# Patient Record
Sex: MALE | Race: WHITE | NOT HISPANIC OR LATINO | Employment: OTHER | URBAN - METROPOLITAN AREA
[De-identification: names, ages, dates, MRNs, and addresses within clinical notes are randomized per-mention and may not be internally consistent; named-entity substitution may affect disease eponyms.]

---

## 2017-11-29 RX ORDER — METOPROLOL SUCCINATE 25 MG/1
25 TABLET, EXTENDED RELEASE ORAL EVERY MORNING
COMMUNITY

## 2017-11-29 RX ORDER — LISINOPRIL 20 MG/1
20 TABLET ORAL 2 TIMES DAILY
COMMUNITY
End: 2019-09-19 | Stop reason: ALTCHOICE

## 2017-11-29 RX ORDER — ROSUVASTATIN CALCIUM 10 MG/1
10 TABLET, COATED ORAL DAILY
COMMUNITY

## 2017-11-29 RX ORDER — ALFUZOSIN HYDROCHLORIDE 10 MG/1
10 TABLET, EXTENDED RELEASE ORAL
COMMUNITY
End: 2019-08-05 | Stop reason: ALTCHOICE

## 2017-11-29 RX ORDER — AZELASTINE 1 MG/ML
1 SPRAY, METERED NASAL 2 TIMES DAILY
COMMUNITY
End: 2019-09-19 | Stop reason: ALTCHOICE

## 2017-11-29 RX ORDER — FLUTICASONE PROPIONATE 50 MCG
1 SPRAY, SUSPENSION (ML) NASAL AS NEEDED
COMMUNITY
End: 2020-11-18 | Stop reason: ALTCHOICE

## 2017-11-29 RX ORDER — AMLODIPINE BESYLATE 10 MG/1
10 TABLET ORAL EVERY MORNING
COMMUNITY
End: 2022-06-21

## 2017-11-29 RX ORDER — BUPROPION HYDROCHLORIDE 300 MG/1
300 TABLET ORAL EVERY MORNING
COMMUNITY

## 2017-11-29 NOTE — PRE-PROCEDURE INSTRUCTIONS
Pre-Surgery Instructions:   Medication Instructions    alfuzosin (UROXATRAL) 10 mg 24 hr tablet Instructed patient per Anesthesia Guidelines   amLODIPine (NORVASC) 10 mg tablet Instructed patient per Anesthesia Guidelines   azelastine (ASTELIN) 0 1 % nasal spray Instructed patient per Anesthesia Guidelines   buPROPion (WELLBUTRIN XL) 300 mg 24 hr tablet Instructed patient per Anesthesia Guidelines   fluticasone (FLONASE) 50 mcg/act nasal spray Instructed patient per Anesthesia Guidelines   Ibrutinib 140 MG CAPS Instructed patient per Anesthesia Guidelines   Ibuprofen (ADVIL PO) Instructed patient per Anesthesia Guidelines   Immune Globulin, Human, 30 GM/300ML SOLN Instructed patient per Anesthesia Guidelines   lisinopril (ZESTRIL) 20 mg tablet Instructed patient per Anesthesia Guidelines   metoprolol succinate (TOPROL-XL) 25 mg 24 hr tablet Instructed patient per Anesthesia Guidelines   Multiple Vitamins-Minerals (PRESERVISION AREDS 2 PO) Instructed patient per Anesthesia Guidelines   rosuvastatin (CRESTOR) 10 MG tablet Instructed patient per Anesthesia Guidelines  Pre op instructions given  Pt to take metoprolol,amlodipine, lisinopril the am of surgery   wife Alex Surgical Experience    The following information was developed to assist you to prepare for your operation  What do I need to do before coming to the hospital?   Arrange for a responsible person to drive you to and from the hospital    Arrange care for your children at home  Children are not allowed in the recovery areas of the hospital   Plan to wear clothing that is easy to put on and take off  If you are having shoulder surgery, wear a shirt that buttons or zippers in the front  Bathing  o Shower the evening before and the morning of your surgery with an antibacterial soap   Please refer to the Pre Op Showering Instructions for Surgery Patients Sheet   o Remove nail polish and all body piercing jewelry  o Do not shave any body part for at least 24 hours before surgery-this includes face, arms, legs and upper body  Food  o Nothing to eat or drink after midnight the night before your surgery  This includes candy and chewing gum  o Exception: If your surgery is after 12:00pm (noon), you may have clear liquids such as 7-Up®, ginger ale, apple or cranberry juice, Jell-O®, water, or clear broth until 8:00 am  o Do not drink milk or juice with pulp on the morning before surgery  o Do not drink alcohol 24 hours before surgery  Medicine  o Follow instructions you received from your surgeon about which medicines you may take on the day of surgery  o If instructed to take medicine on the morning of surgery, take pills with just a small sip of water  Call your prescribing doctor for specific infroamtion on what to do if you take insulin    What should I bring to the hospital?    Bring:  Coralee Belts or a walker, if you have them, for foot or knee surgery   A list of the daily medicines, vitamins, minerals, herbals and nutritional supplements you take  Include the dosages of medicines and the time you take them each day   Glasses, dentures or hearing aids   Minimal clothing; you will be wearing hospital sleepwear   Photo ID; required to verify your identity   If you have a Living Will or Power of , bring a copy of the documents   If you have an ostomy, bring an extra pouch and any supplies you use    Do not bring   Medicines or inhalers   Money, valuables or jewelry    What other information should I know about the day of surgery?  Notify your surgeons if you develop a cold, sore throat, cough, fever, rash or any other illness     Report to the Ambulatory Surgical/Same Day Surgery Unit   You will be instructed to stop at Registration only if you have not been pre-registered   Inform your  fi they do not stay that they will be asked by the staff to leave a phone number where they can be reached   Be available to be reached before surgery  In the event the operating room schedule changes, you may be asked to come in earlier or later than expected    *It is important to tell your doctor and others involved in your health care if you are taking or have been taking any non-prescription drugs, vitamins, minerals, herbals or other nutritional supplements   Any of these may interact with some food or medicines and cause a reaction

## 2017-12-11 ENCOUNTER — ANESTHESIA EVENT (OUTPATIENT)
Dept: PERIOP | Facility: AMBULARY SURGERY CENTER | Age: 77
End: 2017-12-11
Payer: MEDICARE

## 2017-12-11 ENCOUNTER — ANESTHESIA (OUTPATIENT)
Dept: PERIOP | Facility: AMBULARY SURGERY CENTER | Age: 77
End: 2017-12-11
Payer: MEDICARE

## 2017-12-11 ENCOUNTER — HOSPITAL ENCOUNTER (OUTPATIENT)
Facility: AMBULARY SURGERY CENTER | Age: 77
Setting detail: OUTPATIENT SURGERY
Discharge: HOME | End: 2017-12-11
Attending: OPHTHALMOLOGY | Admitting: OPHTHALMOLOGY
Payer: MEDICARE

## 2017-12-11 VITALS
DIASTOLIC BLOOD PRESSURE: 61 MMHG | RESPIRATION RATE: 18 BRPM | HEART RATE: 71 BPM | SYSTOLIC BLOOD PRESSURE: 133 MMHG | HEIGHT: 70 IN | WEIGHT: 170 LBS | OXYGEN SATURATION: 94 % | BODY MASS INDEX: 24.34 KG/M2 | TEMPERATURE: 98 F

## 2017-12-11 PROCEDURE — V2632 POST CHMBR INTRAOCULAR LENS: HCPCS | Performed by: OPHTHALMOLOGY

## 2017-12-11 DEVICE — IOL SN60WF 16.0: Type: IMPLANTABLE DEVICE | Site: EYE | Status: FUNCTIONAL

## 2017-12-11 RX ORDER — PHENYLEPHRINE HCL 2.5 %
1 DROPS OPHTHALMIC (EYE)
Status: ACTIVE | OUTPATIENT
Start: 2017-12-11 | End: 2017-12-11

## 2017-12-11 RX ORDER — TETRACAINE HYDROCHLORIDE 5 MG/ML
SOLUTION OPHTHALMIC AS NEEDED
Status: DISCONTINUED | OUTPATIENT
Start: 2017-12-11 | End: 2017-12-11 | Stop reason: HOSPADM

## 2017-12-11 RX ORDER — CYCLOPENTOLATE HYDROCHLORIDE 10 MG/ML
1 SOLUTION/ DROPS OPHTHALMIC
Status: ACTIVE | OUTPATIENT
Start: 2017-12-11 | End: 2017-12-11

## 2017-12-11 RX ORDER — LIDOCAINE HYDROCHLORIDE 20 MG/ML
1 JELLY TOPICAL
Status: ACTIVE | OUTPATIENT
Start: 2017-12-11 | End: 2017-12-11

## 2017-12-11 RX ORDER — TETRACAINE HYDROCHLORIDE 5 MG/ML
1 SOLUTION OPHTHALMIC ONCE
Status: COMPLETED | OUTPATIENT
Start: 2017-12-11 | End: 2017-12-11

## 2017-12-11 RX ORDER — GATIFLOXACIN 5 MG/ML
SOLUTION/ DROPS OPHTHALMIC AS NEEDED
Status: DISCONTINUED | OUTPATIENT
Start: 2017-12-11 | End: 2017-12-11 | Stop reason: HOSPADM

## 2017-12-11 RX ORDER — MIDAZOLAM HYDROCHLORIDE 1 MG/ML
INJECTION INTRAMUSCULAR; INTRAVENOUS AS NEEDED
Status: DISCONTINUED | OUTPATIENT
Start: 2017-12-11 | End: 2017-12-11 | Stop reason: SURG

## 2017-12-11 RX ORDER — GATIFLOXACIN 5 MG/ML
1 SOLUTION/ DROPS OPHTHALMIC 2 TIMES DAILY
Qty: 3 ML | Refills: 0
Start: 2017-12-11 | End: 2019-09-19 | Stop reason: ALTCHOICE

## 2017-12-11 RX ORDER — KETOROLAC TROMETHAMINE 5 MG/ML
1 SOLUTION OPHTHALMIC
Status: ACTIVE | OUTPATIENT
Start: 2017-12-11 | End: 2017-12-11

## 2017-12-11 RX ADMIN — PHENYLEPHRINE HYDROCHLORIDE 1 DROP: 25 SOLUTION/ DROPS OPHTHALMIC at 08:36

## 2017-12-11 RX ADMIN — MIDAZOLAM HYDROCHLORIDE 2 MG: 1 INJECTION, SOLUTION INTRAMUSCULAR; INTRAVENOUS at 09:20

## 2017-12-11 RX ADMIN — KETOROLAC TROMETHAMINE 1 DROP: 5 SOLUTION OPHTHALMIC at 08:48

## 2017-12-11 RX ADMIN — LIDOCAINE HYDROCHLORIDE 1 APPLICATION: 20 JELLY TOPICAL at 08:36

## 2017-12-11 RX ADMIN — LIDOCAINE HYDROCHLORIDE 1 APPLICATION: 20 JELLY TOPICAL at 08:48

## 2017-12-11 RX ADMIN — TETRACAINE HYDROCHLORIDE 1 DROP: 5 SOLUTION OPHTHALMIC at 08:35

## 2017-12-11 RX ADMIN — CYCLOPENTOLATE HYDROCHLORIDE 1 DROP: 10 SOLUTION/ DROPS OPHTHALMIC at 08:36

## 2017-12-11 RX ADMIN — KETOROLAC TROMETHAMINE 1 DROP: 5 SOLUTION OPHTHALMIC at 09:03

## 2017-12-11 RX ADMIN — CYCLOPENTOLATE HYDROCHLORIDE 1 DROP: 10 SOLUTION/ DROPS OPHTHALMIC at 09:03

## 2017-12-11 RX ADMIN — PHENYLEPHRINE HYDROCHLORIDE 1 DROP: 25 SOLUTION/ DROPS OPHTHALMIC at 08:48

## 2017-12-11 RX ADMIN — KETOROLAC TROMETHAMINE 1 DROP: 5 SOLUTION OPHTHALMIC at 08:36

## 2017-12-11 RX ADMIN — PHENYLEPHRINE HYDROCHLORIDE 1 DROP: 25 SOLUTION/ DROPS OPHTHALMIC at 09:03

## 2017-12-11 RX ADMIN — CYCLOPENTOLATE HYDROCHLORIDE 1 DROP: 10 SOLUTION/ DROPS OPHTHALMIC at 08:48

## 2017-12-11 RX ADMIN — LIDOCAINE HYDROCHLORIDE 1 APPLICATION: 20 JELLY TOPICAL at 09:03

## 2017-12-11 NOTE — DISCHARGE INSTRUCTIONS
Dr Ysabel Barrientos Cataract Instructions    Activity:     1  No Driving until instructed   2  Keep shield on until seen tomorrow except when administering drops   3  No heavy lifting   4  No water in eye     Diet:     1  Resume normal diet    Normal Symptoms:     1  Mild Headache   2  Scratchy or picky feeling around eye    Call the office if:     1  You have any questions or concerns   2  If eye pain is not relieved by extra strength tylenol    Office phone number:  [phone]      Next appointment:     1  See Dr Ysabel Barrientos at his office tomorrow as scheduled   ___915am_______________________________________________________   2  Bring blue eye kit with you and eyedrops to the office    A new set of comprehensive instructions will be given and reviewed with you during your office visit tomorrow

## 2017-12-11 NOTE — OP NOTE
OPERATIVE REPORT    PATIENT NAME: Kait Michel    :  1940  MRN: [id number]  Pt Location: [facility] OR ROOM 01    Surgery Date: 2017    Surgeon(s) and Role:     * Leigh Pulido MD - Primary    Age-related nuclear cataract of left eye [H25 12]    Post-Op Diagnosis Codes:     * Age-related nuclear cataract of left eye [H25 12]    Procedure(s):  EXTRACTION EXTRACAPSULAR CATARACT PHACO INTRAOCULAR LENS (IOL)    Anesthesia Type:   IV Sedation with Anesthesia    Operative Indications:  Age-related nuclear cataract of left eye [H25 12]  Decreased vision to 20/40  With problems driving  Pt requested cataract sx the left eye    Procedure and Technique:    Procedure Details     The patient was brought in the OR in stable condition and placed on the operative table  The left eye was prepped and draped in the usual sterile manner  Attention was directed to the left eye where a lid speculum was placed  A 2 4 mm clear corneal incision was made temperally  1/2 cc of 1% MPF Lidocaine was irrigated into the anterior chamber followed by viscoat  The side port incision was placed superiorly  The capsularrhexis was made and the nucleus was hydrodissected with BSS  The nucleus was then removed with the phaco handpiece followed by removal of the cortical material with the I/A handpiece  The capsular bag was then filled with Provisc  The IOL was folded and placed in to the capsular bag and centered well  The remaining Provisc was removed from the eye with the I/A  The wounds were hydrated with BSS and found to be water tight  The lid speculum was removed and 2 drops of Gatifloxicin were placed over the cornea  A protective eye shield was taped over the eye and the patient went to PACU in stable condition  I will see the patient in the office tomorrow and the expected post op period is a few weeks         Complications: None        Disposition: PACU   Condition: Stable    SIGNATURE: Leigh Pulido MD  DATE:  2017  TIME: 9:43 AM

## 2017-12-11 NOTE — ANESTHESIA PREPROCEDURE EVALUATION
Review of Systems/Medical History  Patient summary reviewed  Chart reviewed      Cardiovascular  Hyperlipidemia, Hypertension ,    Pulmonary  Sleep apnea , ,        GI/Hepatic            Endo/Other  Arthritis     GYN       Hematology   Musculoskeletal       Neurology   Psychology   Depression ,            Physical Exam    Airway    Mallampati score: II  TM Distance: >3 FB  Neck ROM: full     Dental       Cardiovascular  Rhythm: regular, Rate: normal,     Pulmonary  Breath sounds clear to auscultation,     Other Findings        Anesthesia Plan  ASA Score- 3       Anesthesia Type- general with ASA Monitors  Additional Monitors:   Airway Plan:           Induction- intravenous  Informed Consent- Anesthetic plan and risks discussed with patient

## 2018-07-10 ENCOUNTER — TELEPHONE (OUTPATIENT)
Dept: PULMONOLOGY | Facility: MEDICAL CENTER | Age: 78
End: 2018-07-10

## 2019-05-06 ENCOUNTER — OFFICE VISIT (OUTPATIENT)
Dept: PULMONOLOGY | Facility: MEDICAL CENTER | Age: 79
End: 2019-05-06
Payer: MEDICARE

## 2019-05-06 VITALS
HEART RATE: 60 BPM | WEIGHT: 170 LBS | SYSTOLIC BLOOD PRESSURE: 122 MMHG | RESPIRATION RATE: 12 BRPM | OXYGEN SATURATION: 92 % | HEIGHT: 69 IN | BODY MASS INDEX: 25.18 KG/M2 | TEMPERATURE: 96.9 F | DIASTOLIC BLOOD PRESSURE: 54 MMHG

## 2019-05-06 DIAGNOSIS — J45.30 MILD PERSISTENT ASTHMA WITHOUT COMPLICATION: Primary | ICD-10-CM

## 2019-05-06 DIAGNOSIS — G47.33 OBSTRUCTIVE SLEEP APNEA: ICD-10-CM

## 2019-05-06 DIAGNOSIS — C91.10 CHRONIC LYMPHOCYTIC LEUKEMIA (CLL) GENETIC MUTATION VARIANT (HCC): ICD-10-CM

## 2019-05-06 DIAGNOSIS — R05.9 COUGH: ICD-10-CM

## 2019-05-06 PROCEDURE — 94010 BREATHING CAPACITY TEST: CPT | Performed by: INTERNAL MEDICINE

## 2019-05-06 PROCEDURE — 99214 OFFICE O/P EST MOD 30 MIN: CPT | Performed by: INTERNAL MEDICINE

## 2019-05-06 RX ORDER — TADALAFIL 5 MG/1
TABLET ORAL
COMMUNITY
Start: 2019-04-05

## 2019-05-06 RX ORDER — IPRATROPIUM BROMIDE AND ALBUTEROL SULFATE 2.5; .5 MG/3ML; MG/3ML
3 SOLUTION RESPIRATORY (INHALATION) ONCE
Status: COMPLETED | OUTPATIENT
Start: 2019-05-06 | End: 2019-05-06

## 2019-05-06 RX ADMIN — IPRATROPIUM BROMIDE AND ALBUTEROL SULFATE 3 ML: 2.5; .5 SOLUTION RESPIRATORY (INHALATION) at 11:09

## 2019-05-12 PROBLEM — C91.10: Status: ACTIVE | Noted: 2019-05-12

## 2019-05-12 PROBLEM — R05.9 COUGH: Status: ACTIVE | Noted: 2019-05-12

## 2019-05-12 PROBLEM — G47.33 OBSTRUCTIVE SLEEP APNEA: Status: ACTIVE | Noted: 2019-05-12

## 2019-06-04 ENCOUNTER — TRANSCRIBE ORDERS (OUTPATIENT)
Dept: ADMINISTRATIVE | Facility: HOSPITAL | Age: 79
End: 2019-06-04

## 2019-06-04 DIAGNOSIS — M85.80 OSTEOPENIA, UNSPECIFIED LOCATION: ICD-10-CM

## 2019-06-04 DIAGNOSIS — Z13.820 SPECIAL SCREENING FOR OSTEOPOROSIS: Primary | ICD-10-CM

## 2019-06-13 ENCOUNTER — OFFICE VISIT (OUTPATIENT)
Dept: PULMONOLOGY | Facility: MEDICAL CENTER | Age: 79
End: 2019-06-13
Payer: MEDICARE

## 2019-06-13 VITALS
HEART RATE: 60 BPM | DIASTOLIC BLOOD PRESSURE: 62 MMHG | SYSTOLIC BLOOD PRESSURE: 134 MMHG | OXYGEN SATURATION: 95 % | BODY MASS INDEX: 25.18 KG/M2 | WEIGHT: 170 LBS | TEMPERATURE: 98.2 F | RESPIRATION RATE: 12 BRPM | HEIGHT: 69 IN

## 2019-06-13 DIAGNOSIS — G47.33 OBSTRUCTIVE SLEEP APNEA: Primary | ICD-10-CM

## 2019-06-13 DIAGNOSIS — R05.9 COUGH: ICD-10-CM

## 2019-06-13 DIAGNOSIS — J45.30 MILD PERSISTENT ASTHMA WITHOUT COMPLICATION: ICD-10-CM

## 2019-06-13 PROCEDURE — 94010 BREATHING CAPACITY TEST: CPT | Performed by: INTERNAL MEDICINE

## 2019-06-13 PROCEDURE — 99214 OFFICE O/P EST MOD 30 MIN: CPT | Performed by: INTERNAL MEDICINE

## 2019-06-29 ENCOUNTER — HOSPITAL ENCOUNTER (OUTPATIENT)
Dept: RADIOLOGY | Age: 79
Discharge: HOME | End: 2019-06-29
Payer: MEDICARE

## 2019-06-29 DIAGNOSIS — Z13.820 SPECIAL SCREENING FOR OSTEOPOROSIS: ICD-10-CM

## 2019-06-29 DIAGNOSIS — M85.80 OSTEOPENIA, UNSPECIFIED LOCATION: ICD-10-CM

## 2019-06-29 PROCEDURE — 77080 DXA BONE DENSITY AXIAL: CPT

## 2019-07-24 ENCOUNTER — OFFICE VISIT (OUTPATIENT)
Dept: PULMONOLOGY | Facility: MEDICAL CENTER | Age: 79
End: 2019-07-24
Payer: MEDICARE

## 2019-07-24 VITALS
RESPIRATION RATE: 12 BRPM | DIASTOLIC BLOOD PRESSURE: 62 MMHG | BODY MASS INDEX: 25.48 KG/M2 | TEMPERATURE: 97.9 F | WEIGHT: 172 LBS | OXYGEN SATURATION: 89 % | SYSTOLIC BLOOD PRESSURE: 142 MMHG | HEART RATE: 65 BPM | HEIGHT: 69 IN

## 2019-07-24 DIAGNOSIS — J45.30 MILD PERSISTENT ASTHMA WITHOUT COMPLICATION: ICD-10-CM

## 2019-07-24 DIAGNOSIS — G47.33 OBSTRUCTIVE SLEEP APNEA: ICD-10-CM

## 2019-07-24 DIAGNOSIS — G47.33 OSA (OBSTRUCTIVE SLEEP APNEA): Primary | ICD-10-CM

## 2019-07-24 PROCEDURE — 99213 OFFICE O/P EST LOW 20 MIN: CPT | Performed by: NURSE PRACTITIONER

## 2019-07-24 RX ORDER — AMOXICILLIN AND CLAVULANATE POTASSIUM 875; 125 MG/1; MG/1
1 TABLET, FILM COATED ORAL 2 TIMES DAILY
COMMUNITY
Start: 2019-07-23 | End: 2019-08-05 | Stop reason: SDUPTHER

## 2019-07-24 RX ORDER — AMOXICILLIN AND CLAVULANATE POTASSIUM 875; 125 MG/1; MG/1
TABLET, FILM COATED ORAL
Refills: 0 | COMMUNITY
Start: 2019-07-23 | End: 2019-09-19 | Stop reason: ALTCHOICE

## 2019-07-24 NOTE — ASSESSMENT & PLAN NOTE
Patient has history of mild intermittent asthma  pulmonary function tests were reviewed patient from 5/6/19  FVC was 3 61 L or 89% of predicted, FEV   Is currently on Augmentin  He has a mild bronchitis  He has a history of CLL  According to patient IgG E transfusions have not been available  He did speak to his hematologist oncologist at [facility] [place] and antibiotics were prescribed  Currently he is stable  He has not used a maintenance inhaler in some time

## 2019-07-24 NOTE — PROGRESS NOTES
Assessment/Plan:     Problem List Items Addressed This Visit        Respiratory    Obstructive sleep apnea (Chronic)     Arya Huggins has history of obstructive sleep apnea  He has been using his CPAP  At last visit Dr Kong Stockton did change his CPAP pressure range 10-18 cm of water pressure  Compliance data is reviewed from June 24, 2000 19-7 03/20/2019  Patient is 90% compliant with usage over 4 hours for 70%  Average usage is 7 hours and 30 minutes  This is on 10-18 cm of water pressure  His AHI has since been reduced to 7 4 events per hour  At last visit, compliance was reviewed from May 14 to June 12 2019  Resulting AHI was 12 2  Patient would also like to try a new mask  I placed an order for ResMed med air touch F20 with foam insert  This is a fullface mask  Patient continues to use use an benefit from auto CPAP  Mild persistent asthma without complication     Patient has history of mild intermittent asthma  pulmonary function tests were reviewed patient from 5/6/19  FVC was 3 61 L or 89% of predicted, FEV   Is currently on Augmentin  He has a mild bronchitis  He has a history of CLL  According to patient IgG E transfusions have not been available  He did speak to his hematologist oncologist at [facility] [place] and antibiotics were prescribed  Currently he is stable  He has not used a maintenance inhaler in some time  Other Visit Diagnoses     STEFANY (obstructive sleep apnea)    -  Primary            Return in about 6 months (around 1/24/2020)  All questions are answered to the patient's satisfaction and understanding  He verbalizes understanding  He is encouraged to call with any further questions or concerns  Portions of the record may have been created with voice recognition software  Occasional wrong word or "sound a like" substitutions may have occurred due to the inherent limitations of voice recognition software    Read the chart carefully and recognize, using context, where substitutions have occurred  HPI:  Bryce Anton is a 68-year-old male who was last seen in the office June 13, 2019  He has a history of mild persistent asthma  He has not been using any maintenance inhaler  Last pulmonary function test was done May 6, 2019  Mild airway obstruction was noted  Forced vital capacity was 3 60 L or 89% of predicted, FEV1 was 2 14 L or 74% of predicted obstruction ratio 59  Flow volume loop showed a mild obstructive pattern  Bryce Anton also has a diagnosis of obstructive sleep apnea  Electronically Signed by MAREN Oliva    ______________________________________________________________________    Chief Complaint:   Chief Complaint   Patient presents with    Shortness of Breath     pt states that he has had  cold symptoms for  about 4 days ago   Cough     pt states that he also had a sore throat and running nose/ congestion    Sleep Apnea     pt states he has no issues with machine  except when he had nasal congestion in which he did not use machine for 2 days       Patient ID: Bryce Anton is a 70 y o  y o  male has a past medical history of Arthritis, BPH (benign prostatic hyperplasia), Cancer (HCC), Chronic lymphocytic leukemia (CLL), B-cell ([address] ), CPAP (continuous positive airway pressure) dependence, Depression, Hearing aid worn, Hyperlipidemia, Hypertension, Sinusitis, Sleep apnea, Tinnitus, and Wears glasses  7/24/2019  Patient presents today for follow-up visit  HPI    Review of Systems    Smoking history: He reports that he quit smoking about 34 years ago  He has a 20 00 pack-year smoking history  He has never used smokeless tobacco     The following portions of the patient's history were reviewed and updated as appropriate: allergies, current medications, past family history, past medical history, past social history, past surgical history and problem list       There is no immunization history on file for this patient    Current Outpatient Medications   Medication Sig Dispense Refill    alfuzosin (UROXATRAL) 10 mg 24 hr tablet Take 10 mg by mouth daily at bedtime      amLODIPine (NORVASC) 10 mg tablet Take 10 mg by mouth every morning      amoxicillin-clavulanate (AUGMENTIN) 875-125 mg per tablet Take 1 tablet by mouth 2 (two) times a day      azelastine (ASTELIN) 0 1 % nasal spray 1 spray into each nostril 2 (two) times a day Use in each nostril as directed      Bromfenac Sodium (BROMSITE) 0 075 % SOLN Administer 1 drop into the left eye 2 (two) times a day  0    buPROPion (WELLBUTRIN XL) 300 mg 24 hr tablet Take 300 mg by mouth every morning      fluticasone (FLONASE) 50 mcg/act nasal spray 1 spray into each nostril as needed for rhinitis      Ibrutinib 140 MG CAPS Take by mouth every morning      Ibrutinib 140 MG CAPS Take by mouth      Ibuprofen (ADVIL PO) Take 600 mg by mouth 2 (two) times a day      immune globulin, human (BIVIGAM) 5 GM/50ML Infuse 30 g into a venous catheter      Immune Globulin, Human, 30 GM/300ML SOLN Inject as directed Every 6 weeks-IV      lisinopril (ZESTRIL) 20 mg tablet Take 20 mg by mouth 2 (two) times a day      metoprolol succinate (TOPROL-XL) 25 mg 24 hr tablet Take 37 5 mg by mouth every morning       Multiple Vitamins-Minerals (ICAPS AREDS 2 PO)       Multiple Vitamins-Minerals (PRESERVISION AREDS 2 PO) Take by mouth 2 (two) times a day      rosuvastatin (CRESTOR) 10 MG tablet Take 10 mg by mouth every morning      tadalafil (CIALIS) 5 MG tablet       amoxicillin-clavulanate (AUGMENTIN) 875-125 mg per tablet TAKE 1 TABLET BY MOUTH EVERY 12 HOURS FOR 14 DAYS  0    gatifloxacin (ZYMAXID) 0 5 % Administer 1 drop into the left eye 2 (two) times a day (Patient not taking: Reported on 5/6/2019) 3 mL 0     No current facility-administered medications for this visit        Allergies: Rocephin [ceftriaxone]    Objective:  Vitals:    07/24/19 0924   BP: 142/62   BP Location: Left arm   Patient Position: Sitting   Cuff Size: Standard Pulse: 65   Resp: 12   Temp: 97 9 °F (36 6 °C)   TempSrc: Tympanic   SpO2: (!) 89%   Weight: 78 kg (172 lb)   Height: 5' 9" (1 753 m)   Oxygen Therapy  SpO2: (!) 89 %    Wt Readings from Last 3 Encounters:   07/24/19 78 kg (172 lb)   06/13/19 77 1 kg (170 lb)   05/06/19 77 1 kg (170 lb)     Body mass index is 25 4 kg/m²  Physical Exam   Constitutional: He is oriented to person, place, and time  He appears well-developed and well-nourished  HENT:   Head: Normocephalic and atraumatic  Mouth/Throat: Oropharynx is clear and moist    Mallampati 3   Eyes: Pupils are equal, round, and reactive to light  Neck: Normal range of motion  Neck supple  Cardiovascular: Normal rate and regular rhythm  Pulmonary/Chest: Effort normal and breath sounds normal    Abdominal: Soft  Musculoskeletal: Normal range of motion  Right lower leg: Normal         Left lower leg: Normal    Neurological: He is alert and oriented to person, place, and time  Skin: Skin is warm and dry  Capillary refill takes less than 2 seconds  Psychiatric: He has a normal mood and affect  His behavior is normal        Lab Review:   No visits with results within 6 Month(s) from this visit  Latest known visit with results is:   No results found for any previous visit  Diagnostics:  I have personally reviewed pertinent reports  Office Spirometry Results:     ESS:    Dxa Bone Density Spine Hip And Pelvis    Result Date: 7/3/2019  Narrative: CENTRAL  DXA SCAN CLINICAL HISTORY:   70year old  male risk factors include chronic lymphocytic leukemia  Previous smoking  Admits to consuming 3 or more alcoholic beverages a day (wine)  Osteoporosis screening  TECHNIQUE: Bone densitometry was performed using a [employer] Horizon A bone densitometer  Regions of interest appear properly placed  There are no obvious fractures or other confounding variables which could limit the study    Degenerative changes of the lumbar spine and hip  This will falsely elevate the bone mineral densities in these regions  COMPARISON:  None  RESULTS: LUMBAR SPINE:  L1-L4: BMD 1 065 gm/cm2 T-score -0 2 Z-score 0 8 LEFT TOTAL HIP: BMD 0 948 gm/cm2 T-score -0 6 Z-score 0 3 LEFT FEMORAL NECK: BMD 0 809 gm/cm2 T-score -0 9 Z-score 0 5 LEFT FOREARM : BMD 0 764 gm/sq-cm, T-score is  -1 1 Z-score is  0 6      Impression: 1  Based on the [facility] classification, the T-score of -1 1 in the left forearm is consistent with low bone mineral density (OSTEOPENIA)  2   Any secondary causes of low bone mineral density should be excluded prior to treatment, if clinically indicated  3   A daily intake of at least 1200 mg calcium and 800 - 1000 IU of Vitamin D, as well as weight bearing and muscle strengthening exercise, fall prevention and avoidance of tobacco and excessive alcohol intake as basic preventive measures are suggested  The 10 year risk of hip fracture is 2%, with the 10 year risk of major osteoporotic fracture being 7%, as calculated by the [facility] fracture risk assessment tool (FRAX)  The current NOF guidelines recommend treating patients with FRAX 10 year risk score of >3% for hip fracture and >20% for major osteoporotic fracture   WHO CLASSIFICATION: Normal (a T-score of -1 0 or higher) Low bone mineral density (a T-score of less than -1 0 but higher than -2 5) Osteoporosis (a T-score of -2 5 or less) Severe osteoporosis (a T-score of -2 5 or less with a fragility fracture)   Workstation performed: [id number]

## 2019-07-24 NOTE — ASSESSMENT & PLAN NOTE
Arnoldo Mccullough has history of obstructive sleep apnea  He has been using his CPAP  At last visit Dr Tyrese Villa did change his CPAP pressure range 10-18 cm of water pressure  Compliance data is reviewed from June 24, 2000 19-7 03/20/2019  Patient is 90% compliant with usage over 4 hours for 70%  Average usage is 7 hours and 30 minutes  This is on 10-18 cm of water pressure  His AHI has since been reduced to 7 4 events per hour  At last visit, compliance was reviewed from May 14 to June 12 2019  Resulting AHI was 12 2  Patient would also like to try a new mask  I placed an order for ResMed med air touch F20 with foam insert  This is a fullface mask  Patient continues to use use an benefit from auto CPAP

## 2019-08-05 ENCOUNTER — CONSULT (OUTPATIENT)
Dept: VASCULAR SURGERY | Facility: CLINIC | Age: 79
End: 2019-08-05
Payer: MEDICARE

## 2019-08-05 VITALS
BODY MASS INDEX: 24.34 KG/M2 | HEART RATE: 56 BPM | HEIGHT: 70 IN | TEMPERATURE: 97 F | DIASTOLIC BLOOD PRESSURE: 58 MMHG | SYSTOLIC BLOOD PRESSURE: 126 MMHG | WEIGHT: 170 LBS

## 2019-08-05 DIAGNOSIS — I73.9 PERIPHERAL ARTERIAL DISEASE (HCC): Primary | ICD-10-CM

## 2019-08-05 DIAGNOSIS — I10 HYPERTENSION, UNSPECIFIED TYPE: ICD-10-CM

## 2019-08-05 DIAGNOSIS — C91.10 CHRONIC LYMPHOCYTIC LEUKEMIA (CLL) GENETIC MUTATION VARIANT (HCC): ICD-10-CM

## 2019-08-05 DIAGNOSIS — I70.211: ICD-10-CM

## 2019-08-05 DIAGNOSIS — E78.5 HYPERLIPIDEMIA, UNSPECIFIED HYPERLIPIDEMIA TYPE: ICD-10-CM

## 2019-08-05 PROCEDURE — 99204 OFFICE O/P NEW MOD 45 MIN: CPT | Performed by: NURSE PRACTITIONER

## 2019-08-05 RX ORDER — ASPIRIN 81 MG/1
81 TABLET ORAL DAILY
Qty: 30 TABLET | Refills: 1 | Status: SHIPPED | OUTPATIENT
Start: 2019-08-05 | End: 2019-09-19 | Stop reason: ALTCHOICE

## 2019-08-05 NOTE — PROGRESS NOTES
Assessment/Plan:    Athscl native arteries of flavio w intrmt sean, right leg ([facility] [address] )  76yo male with PMH HTN, HLD, CLL, STEFANY, DM, PAD with previous right iliac artery stenting and back surgery for neurogenic neuropathy, now presents with intermittent claudication to the RLE  -Symptoms for apprx 6-8 weeks; short distance claudication, apprx 300ft  -one incident of rest pain, otherwise denies any rest pain  -motor/sensory intact to RLE    Recent LEAD at outside facility reviewed with the patient and his wife  He has diffuse disease to the right lower extremity with focal stenosis in the mid to distal SFA with monophasic signals at the femoral, popliteal and peroneal artery as noted  His BRUNO is 0 96/GTP 96     We discussed the pathophysiology of atherosclerotic disease, patient and his wife expressed understanding  There is no need for urgent vascular intervention as patient is motor/sensory intact with easily palpable Femoral, DP, PT pulse to the right leg  Recommendations:  -Will obtain exercise BRUNO to further assess PAD/claudication symptoms  -Will also obtain aorto-iliac duplex to assess patency of iliac stent; he has easily palpable Right femoral pulse, the left was 1+   -discussed best medical management to include aspirin daily; patient has difficulty with bruising and bleeding due to his CLL and medical treatment  He will trial an 81mg aspirin daily and discuss further with Vascular surgeon at next office visit; continue statin therapy  -No recent lipid panel; will order now; f/u with PCP for statin dosing and long-term f/u  -continue with optimal BP control per PCP  -after testing is completed, patient will return to the office to discuss with Dr Jose Arango  -discussed acute ischemia and changes, when to go to ER for evaluation; patient expressed understanding       Diagnoses and all orders for this visit:    Peripheral arterial disease ([facility] [address] )  -     aspirin (ECOTRIN LOW STRENGTH) 81 mg EC tablet;  Take 1 tablet (81 mg total) by mouth daily  -     VAS BRUNO with exercise study; Future  -     VAS abdominal aorta/iliacs; complete study; Future  -     Lipid panel; Future    Hyperlipidemia, unspecified hyperlipidemia type  -     Lipid panel; Future    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, right leg ([facility] [address] )    Hypertension, unspecified type    Chronic lymphocytic leukemia (CLL) genetic mutation variant ([facility] [address] )          Subjective:      Patient ID: Sara Pate is a 70 y o  male  Pt is new to our office  Pt is here to review results of LEXY done on 8/1/2019  He c/o aching pain in his right calf when he walks or rides a bike  He says that when he rests for several minutes the pain subsides  He says that he has occasional pain at night when legs are elevated  He says that the pain was in his right calf and woke him up  Pt denies any open wounds  Pt takes Rosuvastatin 10 mg daily  He is a former smoker  Cannon is a 76yo male with PMH HTN, HLD, CLL, STEFANY, DM, PAD with previous right iliac artery stenting and back surgery for neurogenic neuropathy, now presents with intermittent claudication to the RLE  He was referred to our office by Dr Anamika Mosqueda for evaluation  Patient reports he's had symptoms for apprx 6-8 weeks; He gets pain and cramping to the right calf with short distances, apprx 300ft and with bike rides  He also had one incident of rest pain, otherwise denies any rest pain  He reports the pain to be at the lower leg/ankle area  Not radiating up the leg or into the foot  He remains motor/sensory intact to RLE, denies cold extremity, paleness, wounds, or ulcerations  He is a former smoker, he is on statin  The following portions of the patient's history were reviewed and updated as appropriate: allergies, current medications, past family history, past medical history, past social history, past surgical history and problem list     Review of Systems   Constitutional: Negative  HENT: Negative  Eyes: Negative  Respiratory: Positive for cough  Cardiovascular: Negative  Gastrointestinal: Negative  Endocrine: Negative  Genitourinary: Positive for difficulty urinating  Musculoskeletal: Positive for arthralgias and back pain  Skin: Negative  Allergic/Immunologic: Negative  Neurological: Negative  Hematological: Negative  Psychiatric/Behavioral: Negative  Objective:      /58 (BP Location: Left arm, Patient Position: Sitting, Cuff Size: Standard)   Pulse 56   Temp (!) 97 °F (36 1 °C) (Tympanic)   Ht 5' 10" (1 778 m)   Wt 77 1 kg (170 lb)   BMI 24 39 kg/m²          Physical Exam   Constitutional: He is oriented to person, place, and time  He appears well-developed and well-nourished  HENT:   Head: Normocephalic and atraumatic  Eyes: Pupils are equal, round, and reactive to light  EOM are normal  No scleral icterus  Neck: Normal range of motion  Carotid bruit is not present  Cardiovascular: Normal rate, regular rhythm and normal heart sounds  Pulses:       Carotid pulses are 2+ on the right side, and 2+ on the left side  Radial pulses are 2+ on the right side, and 2+ on the left side  Femoral pulses are 2+ on the right side, and 1+ on the left side  Popliteal pulses are 0 on the right side, and 0 on the left side  Dorsalis pedis pulses are 2+ on the right side, and 2+ on the left side  Posterior tibial pulses are 1+ on the right side, and 1+ on the left side  Pulmonary/Chest: Effort normal and breath sounds normal    Abdominal: Soft  Normal appearance and bowel sounds are normal  He exhibits no abdominal bruit and no pulsatile midline mass  Musculoskeletal: Normal range of motion  Neurological: He is alert and oriented to person, place, and time  He has normal strength  Skin: Skin is warm and dry  Capillary refill takes less than 2 seconds  Abrasion noted  Psychiatric: He has a normal mood and affect   His speech is normal and behavior is normal  Judgment and thought content normal  Cognition and memory are normal    Nursing note and vitals reviewed  I have reviewed and made appropriate changes to the review of systems input by the medical assistant  Vitals:    08/05/19 1316   BP: 126/58   BP Location: Left arm   Patient Position: Sitting   Cuff Size: Standard   Pulse: 56   Temp: (!) 97 °F (36 1 °C)   TempSrc: Tympanic   Weight: 77 1 kg (170 lb)   Height: 5' 10" (1 778 m)       Patient Active Problem List   Diagnosis    Mild persistent asthma without complication    Cough    Obstructive sleep apnea    Chronic lymphocytic leukemia (CLL) genetic mutation variant (HCC)    Hyperlipidemia    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, right leg (HCC)    Hypertension       Past Surgical History:   Procedure Laterality Date    BACK SURGERY  09/2017    laminotomy L3,4 S1-    CARPAL TUNNEL RELEASE Bilateral     CATARACT EXTRACTION W/ INTRAOCULAR LENS IMPLANT Left 12/11/2017    Procedure: EXTRACTION EXTRACAPSULAR CATARACT PHACO INTRAOCULAR LENS (IOL);   Surgeon: Tee Friedman MD;  Location: [facility] MAIN OR;  Service: Ophthalmology    CERVICAL DISCECTOMY  1982    C5-6    COLONOSCOPY      HERNIA REPAIR      ROTATOR CUFF REPAIR Left 2006    TONSILLECTOMY         Family History   Problem Relation Age of Onset   [facility] Cancer Mother         bladder    Heart disease Father     Stroke Father     Parkinsonism Brother        Social History     Socioeconomic History    Marital status: /Civil Union     Spouse name: Not on file    Number of children: Not on file    Years of education: Not on file    Highest education level: Not on file   Occupational History    Not on file   Social Needs    Financial resource strain: Not on file    Food insecurity:     Worry: Not on file     Inability: Not on file    Transportation needs:     Medical: Not on file     Non-medical: Not on file   Tobacco Use    Smoking status: Former Smoker     Packs/day: 1 00     Years: 20 00     Pack years: 20 00     Last attempt to quit:      Years since quittin 6    Smokeless tobacco: Never Used   Substance and Sexual Activity    Alcohol use:  Yes     Alcohol/week: 7 0 standard drinks     Types: 7 Glasses of wine per week    Drug use: No    Sexual activity: Not on file   Lifestyle    Physical activity:     Days per week: Not on file     Minutes per session: Not on file    Stress: Not on file   Relationships    Social connections:     Talks on phone: Not on file     Gets together: Not on file     Attends Jainism service: Not on file     Active member of club or organization: Not on file     Attends meetings of clubs or organizations: Not on file     Relationship status: Not on file    Intimate partner violence:     Fear of current or ex partner: Not on file     Emotionally abused: Not on file     Physically abused: Not on file     Forced sexual activity: Not on file   Other Topics Concern    Not on file   Social History Narrative    Not on file       Allergies   Allergen Reactions    Rocephin [Ceftriaxone]      Avoids d/t previous liver toxicity         Current Outpatient Medications:     amLODIPine (NORVASC) 10 mg tablet, Take 10 mg by mouth every morning, Disp: , Rfl:     amoxicillin-clavulanate (AUGMENTIN) 875-125 mg per tablet, TAKE 1 TABLET BY MOUTH EVERY 12 HOURS FOR 14 DAYS, Disp: , Rfl: 0    buPROPion (WELLBUTRIN XL) 300 mg 24 hr tablet, Take 300 mg by mouth every morning, Disp: , Rfl:     Ibrutinib 140 MG CAPS, Take by mouth every morning, Disp: , Rfl:     Immune Globulin, Human, 30 GM/300ML SOLN, Inject as directed Every 6 weeks-IV, Disp: , Rfl:     lisinopril (ZESTRIL) 20 mg tablet, Take 20 mg by mouth 2 (two) times a day, Disp: , Rfl:     metoprolol succinate (TOPROL-XL) 25 mg 24 hr tablet, Take 37 5 mg by mouth every morning , Disp: , Rfl:     Multiple Vitamins-Minerals (ICAPS AREDS 2 PO), , Disp: , Rfl:     Multiple Vitamins-Minerals (PRESERVISION AREDS 2 PO), Take by mouth 2 (two) times a day, Disp: , Rfl:     rosuvastatin (CRESTOR) 10 MG tablet, Take 10 mg by mouth every morning, Disp: , Rfl:     tadalafil (CIALIS) 5 MG tablet, , Disp: , Rfl:     aspirin (ECOTRIN LOW STRENGTH) 81 mg EC tablet, Take 1 tablet (81 mg total) by mouth daily, Disp: 30 tablet, Rfl: 1    azelastine (ASTELIN) 0 1 % nasal spray, 1 spray into each nostril 2 (two) times a day Use in each nostril as directed, Disp: , Rfl:     Bromfenac Sodium (BROMSITE) 0 075 % SOLN, Administer 1 drop into the left eye 2 (two) times a day (Patient not taking: Reported on 8/5/2019), Disp: , Rfl: 0    fluticasone (FLONASE) 50 mcg/act nasal spray, 1 spray into each nostril as needed for rhinitis, Disp: , Rfl:     gatifloxacin (ZYMAXID) 0 5 %, Administer 1 drop into the left eye 2 (two) times a day (Patient not taking: Reported on 5/6/2019), Disp: 3 mL, Rfl: 0    Ibuprofen (ADVIL PO), Take 600 mg by mouth 2 (two) times a day, Disp: , Rfl:

## 2019-08-05 NOTE — PATIENT INSTRUCTIONS
Try taking Asprin 81mg daily either chewable or Enteric coated for PAD  Continue with lipitor daily  I am ordering a Lipid panel as well  Additionally, we will do an exercise BRUNO and aorto-iliac ultrasound to assess the inflow and peripheral flow  Once the testing is completed, you will follow-up with Dr Bard Gilmore  If you have any questions, please call our office  Peripheral Artery Disease   WHAT YOU NEED TO KNOW:   What is peripheral artery disease? Peripheral artery disease (PAD) is narrow, weak, or blocked arteries  It may affect any arteries outside of your heart and brain  PAD is usually the result of a buildup of fat and cholesterol, also called plaque, along your artery walls  Inflammation, a blood clot, or abnormal cell growth could also block your arteries  PAD prevents normal blood flow to your legs and arms  You are at risk of an amputation if poor blood flow keeps wounds from healing or causes gangrene (tissue death)  Without treatment, PAD can also cause a heart attack or stroke  What increases my risk for PAD? · Smoking cigarettes     · Diabetes     · High blood pressure     · High cholesterol     · Age older than 40 years    · Heart disease or a family history of heart disease  What are the signs and symptoms of PAD? Mild PAD usually does not cause symptoms  As the disease worsens over time, you may have the following:  · Pain or cramps in your leg or hip while you walk     · A numb, weak, or heavy feeling in your legs     · Dry, scaly, red, or pale skin on your legs     · Thick or brittle nails, or hair loss on your arms and legs     · Foot sores that will not heal     · Burning or aching in your feet and toes while resting (this may be worse when you lie down)  How is PAD diagnosed? · Angiography  is a test that shows pictures of the arteries in your arms and legs  You will be given contrast liquid to help the arteries show up better on the pictures   The pictures will be taken with an MRI or CT scan  Tell the healthcare provider if you have ever had an allergic reaction to contrast liquid  Do not enter the MRI room with anything metal  Metal can cause serious injury  Tell a healthcare provider if you have any metal in or on your body  · Doppler ankle brachial index (BRUNO)  is a test that compares blood pressure in your ankles to blood pressure in your arms  This tells your healthcare provider how well blood is flowing through the arteries in your legs  How is PAD treated? Treatment can help reduce your risk of a heart attack, stroke, or amputation  You may need more than one of the following:  · Medicines  may be given  Your healthcare provider may give you any of the following:     ¨ Antiplatelet medicine,  such as aspirin, helps prevent blood clots and reduces the risk of a heart attack or stroke  ¨ Statin medicine  helps lower your cholesterol and prevents your PAD from getting worse  · A supervised exercise program  helps you stay active in normal daily activities and may prevent disability  Healthcare providers will help you safely walk or do strength training exercises 3 times a week for 30 to 60 minutes  You will do this for several months, then transition to walking on your own  · Angioplasty  is a procedure to open your artery so blood can flow through normally  A thin tube called a catheter is used to insert a small balloon into your artery  The balloon is inflated to open your blocked artery, and then removed  A tube called a stent may be placed in your artery to hold it open  · Bypass surgery  is used to make a new connection to your artery with a vein from another part of your body, or an artificial graft  The vein or graft is attached to your artery above and below your blockage  This allows blood to flow around the blocked portion of your artery  How can I manage PAD? · Do not smoke    Nicotine and other chemicals in cigarettes and cigars can worsen PAD  They can also increase your risk for a heart attack or stroke  Ask your healthcare provider for information if you currently smoke and need help to quit  E-cigarettes or smokeless tobacco still contain nicotine  Talk to your healthcare provider before you use these products  · Manage other health conditions  Take your medicines as directed  Follow your healthcare provider's instructions if you have high blood pressure or high cholesterol  Perform foot care and check your blood sugar levels as directed if you have diabetes  · Eat heart healthy foods  Eat whole grains, fruits, and vegetables every day  Limit salt and high-fat foods  Ask your healthcare provider for more information on a heart healthy diet  Ask if you need to lose weight  Your healthcare provider can help you create a healthy weight-loss plan  Call 911 for the following:   · You have any of the following signs of a heart attack:      ¨ Squeezing, pressure, or pain in your chest that lasts longer than 5 minutes or returns    ¨ Discomfort or pain in your back, neck, jaw, stomach, or arm     ¨ Trouble breathing    ¨ Nausea or vomiting    ¨ Lightheadedness or a sudden cold sweat, especially with chest pain or trouble breathing    · You have any of the following signs of a stroke:      ¨ Numbness or drooping on one side of your face     ¨ Weakness in an arm or leg    ¨ Confusion or difficulty speaking    ¨ Dizziness, a severe headache, or vision loss  When should I seek immediate care? · You have sores or wounds that will not heal      · You notice black or discolored skin on your arm or leg  · Your skin is cool to the touch  When should I contact my healthcare provider? · You have leg pain when you walk 1/8 mile (200 meters) or less, even with treatment  · Your legs are red, dry, or pale, even with treatment  · You have questions or concerns about your condition or care    CARE AGREEMENT:   You have the right to help plan your care  Learn about your health condition and how it may be treated  Discuss treatment options with your caregivers to decide what care you want to receive  You always have the right to refuse treatment  The above information is an  only  It is not intended as medical advice for individual conditions or treatments  Talk to your doctor, nurse or pharmacist before following any medical regimen to see if it is safe and effective for you  © 2017 [employer]  Information is for End User's use only and may not be sold, redistributed or otherwise used for commercial purposes  All illustrations and images included in CareNotes® are the copyrighted property of A [employer] M [employer]  or [employer]

## 2019-08-05 NOTE — LETTER
August 5, 2019     Dang Mohr MD  [address] [place] [address]    Patient: Jony Macedo   YOB: 1940   Date of Visit: 8/5/2019       Dear Dr Seema Mccarty: Thank you for referring Elvis Jackson to me for evaluation  Below are my notes for this consultation  If you have questions, please do not hesitate to call me  I look forward to following your patient along with you           Sincerely,        MAREN Burnett        CC: Chelsey Bueno MD

## 2019-08-05 NOTE — ASSESSMENT & PLAN NOTE
76yo male with PMH HTN, HLD, CLL, STEFANY, DM, PAD with previous right iliac artery stenting and back surgery for neurogenic neuropathy, now presents with intermittent claudication to the RLE  -Symptoms for apprx 6-8 weeks; short distance claudication, apprx 300ft  -one incident of rest pain, otherwise denies any rest pain  -motor/sensory intact to RLE    Recent LEAD at outside facility reviewed with the patient and his wife  He has diffuse disease to the right lower extremity with focal stenosis in the mid to distal SFA with monophasic signals at the femoral, popliteal and peroneal artery as noted  His BRUNO is 0 96/GTP 96     We discussed the pathophysiology of atherosclerotic disease, patient and his wife expressed understanding  There is no need for urgent vascular intervention as patient is motor/sensory intact with easily palpable Femoral, DP, PT pulse to the right leg  Recommendations:  -Will obtain exercise BRUNO to further assess PAD/claudication symptoms  -Will also obtain aorto-iliac duplex to assess patency of iliac stent; he has easily palpable Right femoral pulse, the left was 1+   -discussed best medical management to include aspirin daily; patient has difficulty with bruising and bleeding due to his CLL and medical treatment    He will trial an 81mg aspirin daily and discuss further with Vascular surgeon at next office visit; continue statin therapy  -No recent lipid panel; will order now; f/u with PCP for statin dosing and long-term f/u  -continue with optimal BP control per PCP  -after testing is completed, patient will return to the office to discuss with Dr Cresencio Nina  -discussed acute ischemia and changes, when to go to ER for evaluation; patient expressed understanding

## 2019-08-13 ENCOUNTER — OFFICE VISIT (OUTPATIENT)
Dept: PULMONOLOGY | Facility: MEDICAL CENTER | Age: 79
End: 2019-08-13
Payer: MEDICARE

## 2019-08-13 VITALS
DIASTOLIC BLOOD PRESSURE: 58 MMHG | BODY MASS INDEX: 24.62 KG/M2 | RESPIRATION RATE: 12 BRPM | OXYGEN SATURATION: 93 % | HEART RATE: 57 BPM | HEIGHT: 70 IN | WEIGHT: 172 LBS | SYSTOLIC BLOOD PRESSURE: 136 MMHG

## 2019-08-13 DIAGNOSIS — R05.9 COUGH: Primary | ICD-10-CM

## 2019-08-13 DIAGNOSIS — J45.30 MILD PERSISTENT ASTHMA WITHOUT COMPLICATION: ICD-10-CM

## 2019-08-13 DIAGNOSIS — G47.33 OBSTRUCTIVE SLEEP APNEA: Chronic | ICD-10-CM

## 2019-08-13 PROCEDURE — 99213 OFFICE O/P EST LOW 20 MIN: CPT | Performed by: NURSE PRACTITIONER

## 2019-08-13 NOTE — ASSESSMENT & PLAN NOTE
Patient has had a cough for approximately the last 5-6 weeks  He was treated with Augmentin as he has history of CLL and was not able to receive his gammaglobulin infusions  Patient did have a CT of chest done at [address] [place] [address] in the early summer of 2019  Nodular infiltrates were seen  These images were reviewed by Dr Jesus Manuel Little  He had a repeat CT that was done at [address] [place] [address]  This was done in April 2019  On May 30, 2019, Repeat CT showed resolution of nodular infiltrates  He finished Augmentin approximately 2 days ago  According to patient, his cough is now recurrent  Is yellow and thick  His  Lungs are clear to auscultation  He did receive infusion of IgG yesterday at [facility]   I will give him a sputum sample and also order a chest x-ray  He can hold off on the chest x-ray unless his cough worsens  Sputum sample will be obtained and hopefully he will be able to produce an adequate specimen tomorrow  Patient does have history of postnasal drip  He has a nasal quality to his voice today  He is instructed to restart fluticasone nasal spray which can be purchased over-the-counter  1 spray in each nostril daily is adequate  I have given the patient a 14 day sample of Arnuity 100 mcg 1 puff daily  If the cough is secondary to inflammatory response, this should help him

## 2019-08-13 NOTE — ASSESSMENT & PLAN NOTE
Patient has a history of obstructive sleep apnea  His initial study was in August 25, 2014  Moderate obstructive sleep apnea was diagnosed  Apneic hypopnea index was 23 events per hour  Oxygen saturation on average was 91% with lowest at 73  He went on to have a titration study  This was done September 2014  He was titrated to a CPAP of 7 cm of water pressure  At that setting there was 1 central apnea 1 mixed apnea and 1 hypopnea for overall AHI of 6 7  REM sleep was achieved  He currently is on auto CPAP  His apneic hypopnea index is reduced to 7 8 average usage is 4 hours and 28 minutes  Clearly his AHI has been reduced since he is using auto CPAP, he still has elevated apneic hypopnea index  Options would include in the future consideration of a repeat titration study  He is also due to get a new mask which was discussed at his last visit    According to patient, he is not able to get this until November of 2018

## 2019-08-13 NOTE — ASSESSMENT & PLAN NOTE
Patient has history of mild persistent asthma without complication  He has not used a maintenance inhaler in some time  He does have a cough that is relatively chronic for the last 5 weeks  He completed antibiotics as he has a history of CLL  He has just recently restarted IgG  Infusions at [facility]   In the interim, I gave him a 2 week sample of inhaled corticosteroid ARnuity 100 mics 1 puff daily    Would like him to try this to see if it is helpful in reducing inflammation

## 2019-08-13 NOTE — PATIENT INSTRUCTIONS
Sleep Apnea   AMBULATORY CARE:   Sleep apnea  is also called obstructive sleep apnea  It is a condition that causes you to stop breathing for 10 seconds or more while you are sleeping  During normal sleep, your throat is kept open by muscles that let air pass through easily  Sleep apnea causes the muscles and tissues around your throat to relax and block air from passing through  Sleep apnea may happen many times while you are asleep  Common symptoms include the following:   · No signs of breathing for 10 seconds or more while you sleep    · Snoring loudly, snorting, gasping or choking while you sleep, and waking up suddenly because of these    · A hard time thinking, remembering things, or focusing on your tasks the following day    · Headache or nausea    · Bedwetting or waking up often during the night to urinate    · Feeling sleepy, slow, and tired during the day  Seek care immediately if:   · You have chest pain or trouble breathing  Contact your healthcare provider if:   · You feel tired or depressed  · You have trouble staying awake during the day  · You have trouble thinking clearly  · You have questions or concerns about your condition or care  Treatment for sleep apnea  includes using a continuous positive airway pressure (CPAP) machine to keep your airway open during sleep  A mask is placed over your nose and mouth, or just your nose  The mask is hooked to the CPAP machine that blows a gentle stream of air into the mask when you breathe  This helps keep your airway open so you can breathe more regularly  Extra oxygen may be given to you through the machine  You may be given a mouth device  It looks like a mouth guard or dental retainer and stops your tongue and mouth tissues from blocking your throat while you sleep  Surgery may be needed to remove extra tissues that block your mouth, throat, or nose  Manage sleep apnea:   · Do not smoke    Nicotine and other chemicals in cigarettes and cigars can cause lung damage  Ask your healthcare provider for information if you currently smoke and need help to quit  E-cigarettes or smokeless tobacco still contain nicotine  Talk to your healthcare provider before you use these products  · Do not drink alcohol or take sedative medicine before you go to sleep  Alcohol and sedatives can relax the muscles and tissues around your throat  This can block the airflow to your lungs  · Maintain a healthy weight  Excess tissue around your throat may restrict your breathing  Ask your healthcare provider for information if you need to lose weight  · Sleep on your side or use pillows designed to prevent sleep apnea  This prevents your tongue or other tissues from blocking your throat  You can also raise the head of your bed  Follow up with your healthcare provider as directed:  Write down your questions so you remember to ask them during your visits  © 2017 [employer] Information is for End User's use only and may not be sold, redistributed or otherwise used for commercial purposes  All illustrations and images included in CareNotes® are the copyrighted property of A [employer] M [employer]  or Zack Youngblood  The above information is an  only  It is not intended as medical advice for individual conditions or treatments  Talk to your doctor, nurse or pharmacist before following any medical regimen to see if it is safe and effective for you

## 2019-08-13 NOTE — PROGRESS NOTES
Assessment/Plan:     Problem List Items Addressed This Visit        Respiratory    Obstructive sleep apnea (Chronic)     Patient has a history of obstructive sleep apnea  His initial study was in August 25, 2014  Moderate obstructive sleep apnea was diagnosed  Apneic hypopnea index was 23 events per hour  Oxygen saturation on average was 91% with lowest at 73  He went on to have a titration study  This was done September 2014  He was titrated to a CPAP of 7 cm of water pressure  At that setting there was 1 central apnea 1 mixed apnea and 1 hypopnea for overall AHI of 6 7  REM sleep was achieved  He currently is on auto CPAP  His apneic hypopnea index is reduced to 7 8 average usage is 4 hours and 28 minutes  Clearly his AHI has been reduced since he is using auto CPAP, he still has elevated apneic hypopnea index  Options would include in the future consideration of a repeat titration study  He is also due to get a new mask which was discussed at his last visit  According to patient, he is not able to get this until November of 2018           Mild persistent asthma without complication     Patient has history of mild persistent asthma without complication  He has not used a maintenance inhaler in some time  He does have a cough that is relatively chronic for the last 5 weeks  He completed antibiotics as he has a history of CLL  He has just recently restarted IgG  Infusions at [facility]   In the interim, I gave him a 2 week sample of inhaled corticosteroid ARnuity 100 mics 1 puff daily  Would like him to try this to see if it is helpful in reducing inflammation         Relevant Medications    fluticasone (ARNUITY ELLIPTA) 100 MCG/ACT AEPB inhaler       Other    Cough - Primary     Patient has had a cough for approximately the last 5-6 weeks  He was treated with Augmentin as he has history of CLL and was not able to receive his gammaglobulin infusions    Patient did have a CT of chest done at [facility] in the early summer of 2019  Nodular infiltrates were seen  These images were reviewed by Dr Rao Meth  He had a repeat CT that was done at [facility] [place]  This was done in April 2019  On May 30, 2019, Repeat CT showed resolution of nodular infiltrates  He finished Augmentin approximately 2 days ago  According to patient, his cough is now recurrent  Is yellow and thick  His  Lungs are clear to auscultation  He did receive infusion of IgG yesterday at [facility]   I will give him a sputum sample and also order a chest x-ray  He can hold off on the chest x-ray unless his cough worsens  Sputum sample will be obtained and hopefully he will be able to produce an adequate specimen tomorrow  Patient does have history of postnasal drip  He has a nasal quality to his voice today  He is instructed to restart fluticasone nasal spray which can be purchased over-the-counter  1 spray in each nostril daily is adequate  I have given the patient a 14 day sample of Arnuity 100 mcg 1 puff daily  If the cough is secondary to inflammatory response, this should help him  Relevant Orders    XR chest pa & lateral    Sputum culture and Gram stain            Return in about 4 weeks (around 9/10/2019)  All questions are answered to the patient's satisfaction and understanding  He verbalizes understanding  He is encouraged to call with any further questions or concerns  Portions of the record may have been created with voice recognition software  Occasional wrong word or "sound a like" substitutions may have occurred due to the inherent limitations of voice recognition software  Read the chart carefully and recognize, using context, where substitutions have occurred  HPI:  Griffin Dixon is a 53-year-old male who has a history of mild persistent asthma without complication  Last pulmonary function test showed mild to moderate airflow obstruction    FEV1 was 2 30 L or 79% of predicted  He has not used a maintenance inhaler for some time  Patient has cough that started in June of 2019  Apparently had a CT of the chest done at [address] [place] [address] that showed resolution of nodular infiltrate in both lower lobes  He was treated with levofloxacin in late April  These infiltrates related to infection  He has history of hypo gamma globulin knee me a related to CLL  Patient has been receiving monthly gamma globulin infusions at [facility]   For about a 2 month  There was a national shortage  Patient did not receive these infusions for nearly 2 months and yesterday restarted  According to patient he has recurrent cough  He was feeling better for some time  He was given Augmentin by his medical oncologist [facility]  Electronically Signed by Gregary Schaumann, CRNP    ______________________________________________________________________    Chief Complaint:   Chief Complaint   Patient presents with    Cough     productive yellow 5 weeks       Patient ID: Lala Jordan is a 70 y o  y o  male has a past medical history of Arthritis, BPH (benign prostatic hyperplasia), Cancer ([address] ), Chronic lymphocytic leukemia (CLL), B-cell ([address] ), CPAP (continuous positive airway pressure) dependence, Depression, Hearing aid worn, Hyperlipidemia, Hypertension, Sinusitis, Sleep apnea, Tinnitus, and Wears glasses  8/13/2019  Patient presents today for follow-up visit  Cough   This is a chronic problem  The current episode started more than 1 month ago  The problem has been unchanged  The cough is productive of sputum  Associated symptoms include nasal congestion and postnasal drip  The symptoms are aggravated by lying down  Review of Systems   Constitutional: Negative  HENT: Positive for postnasal drip and voice change  Respiratory: Positive for cough  Cardiovascular: Negative  Gastrointestinal: Negative  Endocrine: Negative      Genitourinary: Negative  Musculoskeletal: Negative  Skin: Negative  Allergic/Immunologic: Negative  Neurological: Negative  Hematological: Negative  Psychiatric/Behavioral: Negative  Smoking history: He reports that he quit smoking about 34 years ago  He has a 20 00 pack-year smoking history  He has never used smokeless tobacco     The following portions of the patient's history were reviewed and updated as appropriate: allergies, current medications, past family history, past medical history, past social history, past surgical history and problem list       There is no immunization history on file for this patient    Current Outpatient Medications   Medication Sig Dispense Refill    amLODIPine (NORVASC) 10 mg tablet Take 10 mg by mouth every morning      buPROPion (WELLBUTRIN XL) 300 mg 24 hr tablet Take 300 mg by mouth every morning      Ibrutinib 140 MG CAPS Take by mouth every morning      Immune Globulin, Human, 30 GM/300ML SOLN Inject as directed Every 6 weeks-IV      lisinopril (ZESTRIL) 20 mg tablet Take 20 mg by mouth 2 (two) times a day      metoprolol succinate (TOPROL-XL) 25 mg 24 hr tablet Take 37 5 mg by mouth every morning       Multiple Vitamins-Minerals (PRESERVISION AREDS 2 PO) Take by mouth 2 (two) times a day      rosuvastatin (CRESTOR) 10 MG tablet Take 10 mg by mouth every morning      tadalafil (CIALIS) 5 MG tablet       amoxicillin-clavulanate (AUGMENTIN) 875-125 mg per tablet TAKE 1 TABLET BY MOUTH EVERY 12 HOURS FOR 14 DAYS  0    aspirin (ECOTRIN LOW STRENGTH) 81 mg EC tablet Take 1 tablet (81 mg total) by mouth daily (Patient not taking: Reported on 8/13/2019) 30 tablet 1    azelastine (ASTELIN) 0 1 % nasal spray 1 spray into each nostril 2 (two) times a day Use in each nostril as directed      Bromfenac Sodium (BROMSITE) 0 075 % SOLN Administer 1 drop into the left eye 2 (two) times a day (Patient not taking: Reported on 8/5/2019)  0    fluticasone (ARNUITY ELLIPTA) 100 MCG/ACT AEPB inhaler Inhale 1 puff daily Rinse mouth after use  1 Inhaler 0    fluticasone (FLONASE) 50 mcg/act nasal spray 1 spray into each nostril as needed for rhinitis      gatifloxacin (ZYMAXID) 0 5 % Administer 1 drop into the left eye 2 (two) times a day (Patient not taking: Reported on 5/6/2019) 3 mL 0    Ibuprofen (ADVIL PO) Take 600 mg by mouth 2 (two) times a day      Multiple Vitamins-Minerals (ICAPS AREDS 2 PO)        No current facility-administered medications for this visit  Allergies: Rocephin [ceftriaxone]    Objective:  Vitals:    08/13/19 1552   BP: 136/58   BP Location: Left arm   Patient Position: Sitting   Cuff Size: Standard   Pulse: 57   Resp: 12   SpO2: 93%   Weight: 78 kg (172 lb)   Height: 5' 10" (1 778 m)   Oxygen Therapy  SpO2: 93 %    Wt Readings from Last 3 Encounters:   08/13/19 78 kg (172 lb)   08/05/19 77 1 kg (170 lb)   07/24/19 78 kg (172 lb)     Body mass index is 24 68 kg/m²  Physical Exam   Constitutional: He appears well-developed and well-nourished  HENT:   Head: Normocephalic and atraumatic  Mallampati 4   Eyes: Pupils are equal, round, and reactive to light  EOM are normal    Neck: Normal range of motion  Neck supple  Cardiovascular: Normal rate and regular rhythm  Pulmonary/Chest: Effort normal    Abdominal: Soft  Musculoskeletal: Normal range of motion  Neurological: He is alert  Skin: Skin is warm and dry  Capillary refill takes less than 2 seconds  Psychiatric: He has a normal mood and affect  His behavior is normal        Lab Review:   No visits with results within 6 Month(s) from this visit  Latest known visit with results is:   No results found for any previous visit         Diagnostics:  I have personally reviewed pertinent films in PACS    Office Spirometry Results:     ESS:    Us Outside Images    Result Date: 8/6/2019  Narrative: 1 2 410 [id number]

## 2019-09-10 ENCOUNTER — OFFICE VISIT (OUTPATIENT)
Dept: PULMONOLOGY | Facility: MEDICAL CENTER | Age: 79
End: 2019-09-10
Payer: MEDICARE

## 2019-09-10 VITALS
TEMPERATURE: 97.7 F | SYSTOLIC BLOOD PRESSURE: 106 MMHG | OXYGEN SATURATION: 95 % | HEART RATE: 58 BPM | HEIGHT: 71 IN | WEIGHT: 172 LBS | BODY MASS INDEX: 24.08 KG/M2 | DIASTOLIC BLOOD PRESSURE: 50 MMHG | RESPIRATION RATE: 12 BRPM

## 2019-09-10 DIAGNOSIS — J45.30 MILD PERSISTENT ASTHMA WITHOUT COMPLICATION: Primary | ICD-10-CM

## 2019-09-10 DIAGNOSIS — G47.33 OBSTRUCTIVE SLEEP APNEA: Chronic | ICD-10-CM

## 2019-09-10 PROCEDURE — 99214 OFFICE O/P EST MOD 30 MIN: CPT | Performed by: INTERNAL MEDICINE
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
## 2019-09-10 NOTE — PATIENT INSTRUCTIONS
Predicted peak flow rate is 450 liters/minute    Continue Arnuity 100 mcg 1 puff daily    Call office Wednesday to see if we have a coupon for a free Arnuity

## 2019-09-10 NOTE — ASSESSMENT & PLAN NOTE
Mild persistent asthma  Patient will continue Arnuity 100 mcg 1 puff daily    He does have a peak flow meter at home  I did tell his peak flow rate should be about 450 liters/minute    IV starts having any problem postnasal drainage or allergies he can start Claritin or Allegra over-the-counter  Does use Flonase intermittently for postnasal drainage

## 2019-09-11 NOTE — ASSESSMENT & PLAN NOTE
Mynor has moderate obstructive sleep apnea  His initial sleep study was done October 2014 is a home sleep study  This revealed 177 obstructive apneas, 8 central apneas, and 39 obstructive hypopneas for overall AHI of 23  He had some mild snoring noted and average O2 saturation was 91%  Oxygen hardik was 73% he spent 16% of the time below 90%  Hans Boggs has been compliant with using his CPAP and denies any excessive daytime sounds are feeling like he is going to slow all sleep while driving any long distances  He does commute to his [facility]  I reviewed compliance data from 8/11 to 9/9/19  This revealed overall AHI of 6 5 which is satisfactory  His CPAP is set on auto CPAP with a range of 10 to 18 cm water  He used the CPAP for an average of 5 hours per night  His maximal CPAP pressure was 17  I encouraged him to continue to use the CPAP on regular basis    I made no changes in his auto CPAP setting

## 2019-09-13 ENCOUNTER — HOSPITAL ENCOUNTER (OUTPATIENT)
Dept: NON INVASIVE DIAGNOSTICS | Facility: CLINIC | Age: 79
Discharge: HOME | End: 2019-09-13
Payer: MEDICARE

## 2019-09-13 ENCOUNTER — APPOINTMENT (OUTPATIENT)
Dept: LAB | Facility: CLINIC | Age: 79
End: 2019-09-13
Payer: MEDICARE

## 2019-09-13 DIAGNOSIS — I73.9 PERIPHERAL ARTERIAL DISEASE (HCC): ICD-10-CM

## 2019-09-13 DIAGNOSIS — E78.5 HYPERLIPIDEMIA, UNSPECIFIED HYPERLIPIDEMIA TYPE: ICD-10-CM

## 2019-09-13 LAB
CHOLEST SERPL-MCNC: 151 MG/DL (ref 50–200)
HDLC SERPL-MCNC: 67 MG/DL (ref 40–60)
LDLC SERPL CALC-MCNC: 77 MG/DL (ref 0–100)
NONHDLC SERPL-MCNC: 84 MG/DL
TRIGL SERPL-MCNC: 36 MG/DL

## 2019-09-13 PROCEDURE — 93978 VASCULAR STUDY: CPT

## 2019-09-13 PROCEDURE — 36415 COLL VENOUS BLD VENIPUNCTURE: CPT

## 2019-09-13 PROCEDURE — 80061 LIPID PANEL: CPT

## 2019-09-13 PROCEDURE — 93924 LWR XTR VASC STDY BILAT: CPT

## 2019-09-14 PROCEDURE — 93924 LWR XTR VASC STDY BILAT: CPT | Performed by: SURGERY

## 2019-09-14 PROCEDURE — 93978 VASCULAR STUDY: CPT | Performed by: SURGERY

## 2019-09-19 ENCOUNTER — OFFICE VISIT (OUTPATIENT)
Dept: VASCULAR SURGERY | Facility: CLINIC | Age: 79
End: 2019-09-19
Payer: MEDICARE

## 2019-09-19 VITALS
BODY MASS INDEX: 24.34 KG/M2 | SYSTOLIC BLOOD PRESSURE: 130 MMHG | TEMPERATURE: 97 F | DIASTOLIC BLOOD PRESSURE: 56 MMHG | WEIGHT: 170 LBS | HEIGHT: 70 IN

## 2019-09-19 DIAGNOSIS — I70.211: Primary | ICD-10-CM

## 2019-09-19 PROCEDURE — 99213 OFFICE O/P EST LOW 20 MIN: CPT | Performed by: SURGERY

## 2019-09-19 NOTE — PROGRESS NOTES
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, right leg ([address] )  -     Basic metabolic panel; Future  -     CTA abdominal w run off w wo contrast; Future        Subjective:      Patient ID: Landa Blizzard is a 70 y o  male  HPI disabling claudication right lower extremity approximately 1-2 blocks no ischemic rest pain or tissue loss  He had neurogenic claudication in the past and this is not representative that  The following portions of the patient's history were reviewed and updated as appropriate: allergies, current medications, past family history, past medical history, past social history, past surgical history and problem list     Review of Systems  claudication right lower extremity no constitutional symptoms no weight loss fatigue or fever, no  or GI symptoms, no skin rash, all other systems negative    Objective:      /56 (BP Location: Right arm, Patient Position: Sitting)   Temp (!) 97 °F (36 1 °C) (Tympanic)   Ht 5' 10" (1 778 m)   Wt 77 1 kg (170 lb)   BMI 24 39 kg/m²          Physical Exam          Oriented x3 no evidence of clinical depression  Eyes:  Sclera non-icteric    Skin: normal without evidence of inflammation    Neck is supple carotid pulses equal bilaterally no bruits heard    Chest lungs clear, heart regular rhythm      Abdomen soft nontender no evidence of pulsatile masses  Pulses are palpable bilateral Femoral 2+ on the right 1+ on the left, pulses are palpable dorsalis pedis on the right 1+ on the left 2+    Neurological exam intact cranial nerves 2-12 grossly intact no gross motor sensory deficits detected  Imaging viewed and reviewed with Patient    I have reviewed and made appropriate changes to the review of systems input by the medical assistant  Vitals:    09/19/19 1004   BP: 130/56   BP Location: Right arm   Patient Position: Sitting   Temp: (!) 97 °F (36 1 °C)   TempSrc: Tympanic   Weight: 77 1 kg (170 lb)   Height: 5' 10" (1 778 m)       Patient Active Problem List   Diagnosis    Mild persistent asthma without complication    Cough    Obstructive sleep apnea    Chronic lymphocytic leukemia (CLL) genetic mutation variant (HCC)    Hyperlipidemia    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, right leg ([address] )    Hypertension       Past Surgical History:   Procedure Laterality Date    BACK SURGERY  09/2017    laminotomy L3,4 S1-    CARPAL TUNNEL RELEASE Bilateral     CATARACT EXTRACTION W/ INTRAOCULAR LENS IMPLANT Left 12/11/2017    Procedure: EXTRACTION EXTRACAPSULAR CATARACT PHACO INTRAOCULAR LENS (IOL);   Surgeon: Becki Robbins MD;  Location: [facility] MAIN OR;  Service: Ophthalmology    CERVICAL DISCECTOMY  1982    C5-6    COLONOSCOPY      HERNIA REPAIR      ROTATOR CUFF REPAIR Left 2006    TONSILLECTOMY         Family History   Problem Relation Age of Onset   [facility] Cancer Mother         bladder    Heart disease Father     Stroke Father     Parkinsonism Brother        Social History     Socioeconomic History    Marital status: /Civil Union     Spouse name: Not on file    Number of children: Not on file    Years of education: Not on file    Highest education level: Not on file   Occupational History    Not on file   Social Needs    Financial resource strain: Not on file    Food insecurity:     Worry: Not on file     Inability: Not on file    Transportation needs:     Medical: Not on file     Non-medical: Not on file   Tobacco Use    Smoking status: Former Smoker     Packs/day:      Years: 20 00     Pack years: 20 00     Last attempt to quit:      Years since quittin 7    Smokeless tobacco: Never Used   Substance and Sexual Activity    Alcohol use:  Yes     Alcohol/week: 7 0 standard drinks     Types: 7 Glasses of wine per week    Drug use: No    Sexual activity: Not on file   Lifestyle    Physical activity:     Days per week: Not on file     Minutes per session: Not on file    Stress: Not on file   Relationships    Social connections:     Talks on phone: Not on file     Gets together: Not on file     Attends Faith service: Not on file     Active member of club or organization: Not on file     Attends meetings of clubs or organizations: Not on file     Relationship status: Not on file    Intimate partner violence:     Fear of current or ex partner: Not on file     Emotionally abused: Not on file     Physically abused: Not on file     Forced sexual activity: Not on file   Other Topics Concern    Not on file   Social History Narrative    Not on file       Allergies   Allergen Reactions    Rocephin [Ceftriaxone]      Avoids d/t previous liver toxicity         Current Outpatient Medications:     amLODIPine (NORVASC) 10 mg tablet, Take 10 mg by mouth every morning, Disp: , Rfl:     buPROPion (WELLBUTRIN XL) 300 mg 24 hr tablet, Take 300 mg by mouth every morning, Disp: , Rfl:     fluticasone (FLONASE) 50 mcg/act nasal spray, 1 spray into each nostril as needed for rhinitis, Disp: , Rfl:     Ibrutinib 140 MG CAPS, Take by mouth every morning, Disp: , Rfl:     Immune Globulin, Human, 30 GM/300ML SOLN, Inject as directed Every 6 weeks-IV, Disp: , Rfl:     metoprolol succinate (TOPROL-XL) 25 mg 24 hr tablet, Take 37 5 mg by mouth every morning , Disp: , Rfl:     Multiple Vitamins-Minerals (PRESERVISION AREDS 2 PO), Take by mouth 2 (two) times a day, Disp: , Rfl:     rosuvastatin (CRESTOR) 10 MG tablet, Take 10 mg by mouth every morning, Disp: , Rfl:     tadalafil (CIALIS) 5 MG tablet, , Disp: , Rfl:

## 2019-09-19 NOTE — LETTER
September 19, 2019     Micha Camejo MD  [address] [place] [id number]    Patient: Jesse Hirsch   YOB: 1940   Date of Visit: 9/19/2019       Dear Dr Nasra Fontenot: Thank you for referring Bekah Mixon to me for evaluation  Below are my notes for this consultation  If you have questions, please do not hesitate to call me  I look forward to following your patient along with you           Sincerely,        Marjorie Leblanc MD        CC: No Recipients

## 2019-09-19 NOTE — PATIENT INSTRUCTIONS
Presents with disabling claudication right lower extremity is able to walk about 1 block which time he has to rest for about for 5 minutes  No ischemic rest pain or tissue loss  Aortoiliac and lower extremity artery ABIs were performed with stress which did show significant drop of his BRUNO on the right leg  Plan:  Sending off a CT angiogram for evaluation and then a follow-up appointment to review the study and potentially recommend possible intervention

## 2019-09-30 ENCOUNTER — HOSPITAL ENCOUNTER (OUTPATIENT)
Dept: CT IMAGING | Facility: HOSPITAL | Age: 79
Discharge: HOME | End: 2019-09-30
Attending: SURGERY
Payer: MEDICARE

## 2019-09-30 DIAGNOSIS — I70.211: ICD-10-CM

## 2019-09-30 PROCEDURE — 75635 CT ANGIO ABDOMINAL ARTERIES: CPT

## 2019-09-30 RX ADMIN — IOHEXOL 120 ML: 350 INJECTION, SOLUTION INTRAVENOUS at 17:54

## 2019-10-10 ENCOUNTER — OFFICE VISIT (OUTPATIENT)
Dept: VASCULAR SURGERY | Facility: CLINIC | Age: 79
End: 2019-10-10
Payer: MEDICARE

## 2019-10-10 VITALS
HEIGHT: 70 IN | SYSTOLIC BLOOD PRESSURE: 128 MMHG | HEART RATE: 66 BPM | WEIGHT: 175 LBS | DIASTOLIC BLOOD PRESSURE: 58 MMHG | TEMPERATURE: 97.3 F | RESPIRATION RATE: 14 BRPM | BODY MASS INDEX: 25.05 KG/M2

## 2019-10-10 DIAGNOSIS — I70.211: Primary | ICD-10-CM

## 2019-10-10 PROCEDURE — 99212 OFFICE O/P EST SF 10 MIN: CPT | Performed by: SURGERY

## 2019-10-10 RX ORDER — OXYCODONE HYDROCHLORIDE AND ACETAMINOPHEN 5; 325 MG/1; MG/1
1 TABLET ORAL EVERY 4 HOURS PRN
Refills: 0 | COMMUNITY
Start: 2019-10-07 | End: 2020-11-18 | Stop reason: ALTCHOICE

## 2019-10-10 NOTE — ASSESSMENT & PLAN NOTE
Continues with disabling claudication right lower extremity  Able to walk about 1 block and then has to stop for several minutes  He is an active 53-year-old and this does affect the quality of his life      Plan:  Review of CT angiogram with evidence of a distal aortic stent into the right common iliac lesions at the distal superficial femoral and mid popliteal   Plan an antegrade approach on the right verses left brachial approach sometime in near future on an outpatient basis

## 2019-10-10 NOTE — LETTER
October 10, 2019     Chantal Watt MD  [address] [place] [id number]    Patient: Elijah Greenwood   YOB: 1940   Date of Visit: 10/10/2019       Dear Dr Carmona Officer: Thank you for referring Ramesh Camilo to me for evaluation  Below are my notes for this consultation  If you have questions, please do not hesitate to call me  I look forward to following your patient along with you           Sincerely,        Adelina Moura MD        CC: No Recipients

## 2019-10-10 NOTE — PROGRESS NOTES
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, right leg ([address] )  Continues with disabling claudication right lower extremity  Able to walk about 1 block and then has to stop for several minutes  He is an active 62-year-old and this does affect the quality of his life  Plan:  Review of CT angiogram with evidence of a distal aortic stent into the right common iliac lesions at the distal superficial femoral and mid popliteal   Plan an antegrade approach on the right verses left brachial approach sometime in near future on an outpatient basis    Stop Ibrutinib for 4 days 5th day procedure     Diagnoses and all orders for this visit:    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, right leg ([address] )    Other orders  -     oxyCODONE-acetaminophen (PERCOCET) 5-325 mg per tablet; Take 1 tablet by mouth every 4 (four) hours as needed        Subjective:      Patient ID: Kavon Brownlee is a 70 y o  male  HPI short distance claudication right lower extremity approximately 1 block has stopped for several minutes      The following portions of the patient's history were reviewed and updated as appropriate: allergies, current medications, past family history, past medical history, past social history, past surgical history and problem list     Review of Systems  disabling claudication right lower extremity, CLL, no GI or  symptoms, no constitutional symptoms or skin rash, all other systems negative    Objective:      /58 (BP Location: Left arm, Patient Position: Sitting, Cuff Size: Adult)   Pulse 66   Temp (!) 97 3 °F (36 3 °C) (Tympanic)   Resp 14   Ht 5' 10" (1 778 m)   Wt 79 4 kg (175 lb)   BMI 25 11 kg/m²          Physical Exam      Left groin ecchymosis status post hernia repair,    Chest lungs clear heart regular rhythm S1-S2,    Easily palpable left brachial pulse and right femoral pulse    I have reviewed and made appropriate changes to the review of systems input by the medical assistant  Vitals:    10/10/19 1040   BP: 128/58   BP Location: Left arm   Patient Position: Sitting   Cuff Size: Adult   Pulse: 66   Resp: 14   Temp: (!) 97 3 °F (36 3 °C)   TempSrc: Tympanic   Weight: 79 4 kg (175 lb)   Height: 5' 10" (1 778 m)       Patient Active Problem List   Diagnosis    Mild persistent asthma without complication    Cough    Obstructive sleep apnea    Chronic lymphocytic leukemia (CLL) genetic mutation variant (HCC)    Hyperlipidemia    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, right leg (HCC)    Hypertension       Past Surgical History:   Procedure Laterality Date    BACK SURGERY  2017    laminotomy L3,4 S1-    CARPAL TUNNEL RELEASE Bilateral     CATARACT EXTRACTION W/ INTRAOCULAR LENS IMPLANT Left 2017    Procedure: EXTRACTION EXTRACAPSULAR CATARACT PHACO INTRAOCULAR LENS (IOL); Surgeon: Binu Urbina MD;  Location: [facility] MAIN OR;  Service: Ophthalmology    CERVICAL DISCECTOMY      C5-6    COLONOSCOPY      HERNIA REPAIR      ROTATOR CUFF REPAIR Left 2006    TONSILLECTOMY         Family History   Problem Relation Age of Onset   [facility] Cancer Mother         bladder    Heart disease Father     Stroke Father     Parkinsonism Brother        Social History     Socioeconomic History    Marital status: /Civil Union     Spouse name: Not on file    Number of children: Not on file    Years of education: Not on file    Highest education level: Not on file   Occupational History    Not on file   Social Needs    Financial resource strain: Not on file    Food insecurity:     Worry: Not on file     Inability: Not on file    Transportation needs:     Medical: Not on file     Non-medical: Not on file   Tobacco Use    Smoking status: Former Smoker     Packs/day: 1 00     Years: 20 00     Pack years: 20 00     Last attempt to quit:      Years since quittin 7    Smokeless tobacco: Never Used   Substance and Sexual Activity    Alcohol use:  Yes Alcohol/week: 7 0 standard drinks     Types: 7 Glasses of wine per week    Drug use: No    Sexual activity: Not on file   Lifestyle    Physical activity:     Days per week: Not on file     Minutes per session: Not on file    Stress: Not on file   Relationships    Social connections:     Talks on phone: Not on file     Gets together: Not on file     Attends Worship service: Not on file     Active member of club or organization: Not on file     Attends meetings of clubs or organizations: Not on file     Relationship status: Not on file    Intimate partner violence:     Fear of current or ex partner: Not on file     Emotionally abused: Not on file     Physically abused: Not on file     Forced sexual activity: Not on file   Other Topics Concern    Not on file   Social History Narrative    Not on file       Allergies   Allergen Reactions    Rocephin [Ceftriaxone]      Avoids d/t previous liver toxicity         Current Outpatient Medications:     amLODIPine (NORVASC) 10 mg tablet, Take 10 mg by mouth every morning, Disp: , Rfl:     buPROPion (WELLBUTRIN XL) 300 mg 24 hr tablet, Take 300 mg by mouth every morning, Disp: , Rfl:     fluticasone (FLONASE) 50 mcg/act nasal spray, 1 spray into each nostril as needed for rhinitis, Disp: , Rfl:     Ibrutinib 140 MG CAPS, Take by mouth every morning, Disp: , Rfl:     Immune Globulin, Human, 30 GM/300ML SOLN, Inject as directed Every 6 weeks-IV, Disp: , Rfl:     metoprolol succinate (TOPROL-XL) 25 mg 24 hr tablet, Take 37 5 mg by mouth every morning , Disp: , Rfl:     Multiple Vitamins-Minerals (PRESERVISION AREDS 2 PO), Take by mouth 2 (two) times a day, Disp: , Rfl:     oxyCODONE-acetaminophen (PERCOCET) 5-325 mg per tablet, Take 1 tablet by mouth every 4 (four) hours as needed, Disp: , Rfl: 0    rosuvastatin (CRESTOR) 10 MG tablet, Take 10 mg by mouth every morning, Disp: , Rfl:     tadalafil (CIALIS) 5 MG tablet, , Disp: , Rfl:

## 2019-10-10 NOTE — PATIENT INSTRUCTIONS
Continues with disabling claudication right lower extremity  Able to walk about 1 block and then has to stop for several minutes  He is an active 47-year-old and this does affect the quality of his life      Plan:  Review of CT angiogram with evidence of a distal aortic stent into the right common iliac lesions at the distal superficial femoral and mid popliteal   Plan an antegrade approach on the right verses left brachial approach sometime in near future on an outpatient basis

## 2019-10-11 ENCOUNTER — TELEPHONE (OUTPATIENT)
Dept: VASCULAR SURGERY | Facility: CLINIC | Age: 79
End: 2019-10-11

## 2019-10-11 NOTE — TELEPHONE ENCOUNTER
Spoke to pt about scheduling agram, pt is going for vacation in November and and will be back by last week of November  He is looking for 1st delgadillo 2nd week of December  I'll call him back with the date of procedure  Dr Lindsey Morataya is requesting for a senior IR doc to assist this case  Case need to be s/c at [facility]

## 2019-11-19 NOTE — TELEPHONE ENCOUNTER
Spoke with pt to schedule agram on 12-2-19 at [facility] with Dr Chelly Garcia  Pt will go for labs to    Pt was told npo from midnight, hospital will call with arrival time, hold Ibrutinib 4 days prior to procedure, last dose on 11-27-19  F/u after agram is scheduled on 12-19-19 at [facility] office  SJ

## 2019-11-25 ENCOUNTER — TELEPHONE (OUTPATIENT)
Dept: RADIOLOGY | Facility: HOSPITAL | Age: 79
End: 2019-11-25

## 2019-11-26 ENCOUNTER — TELEPHONE (OUTPATIENT)
Dept: RADIOLOGY | Facility: HOSPITAL | Age: 79
End: 2019-11-26

## 2019-11-26 ENCOUNTER — TELEPHONE (OUTPATIENT)
Dept: VASCULAR SURGERY | Facility: CLINIC | Age: 79
End: 2019-11-26

## 2019-11-26 RX ORDER — SODIUM CHLORIDE 9 MG/ML
50 INJECTION, SOLUTION INTRAVENOUS CONTINUOUS
Status: CANCELLED | OUTPATIENT
Start: 2019-11-26

## 2019-11-26 NOTE — TELEPHONE ENCOUNTER
Lm reminding pt that he is holding his Ibrutinib 4 days prior to his procedure and his last dose will be tomorrow, 11-27-19

## 2019-11-27 ENCOUNTER — TELEPHONE (OUTPATIENT)
Dept: RADIOLOGY | Facility: HOSPITAL | Age: 79
End: 2019-11-27

## 2019-11-29 ENCOUNTER — TELEPHONE (OUTPATIENT)
Dept: SURGERY | Facility: HOSPITAL | Age: 79
End: 2019-11-29

## 2019-12-02 ENCOUNTER — HOSPITAL ENCOUNTER (OUTPATIENT)
Dept: RADIOLOGY | Facility: HOSPITAL | Age: 79
Discharge: HOME | End: 2019-12-02
Attending: SURGERY
Payer: MEDICARE

## 2019-12-02 VITALS
HEART RATE: 50 BPM | TEMPERATURE: 96.9 F | DIASTOLIC BLOOD PRESSURE: 57 MMHG | OXYGEN SATURATION: 93 % | HEIGHT: 69 IN | RESPIRATION RATE: 18 BRPM | BODY MASS INDEX: 24.44 KG/M2 | SYSTOLIC BLOOD PRESSURE: 122 MMHG | WEIGHT: 165 LBS

## 2019-12-02 DIAGNOSIS — I70.211: ICD-10-CM

## 2019-12-02 LAB
ANION GAP SERPL CALCULATED.3IONS-SCNC: 6 MMOL/L (ref 4–13)
BUN SERPL-MCNC: 21 MG/DL (ref 5–25)
CALCIUM SERPL-MCNC: 8.8 MG/DL (ref 8.3–10.1)
CHLORIDE SERPL-SCNC: 99 MMOL/L (ref 100–108)
CO2 SERPL-SCNC: 28 MMOL/L (ref 21–32)
CREAT SERPL-MCNC: 1.1 MG/DL (ref 0.6–1.3)
ERYTHROCYTE [DISTWIDTH] IN BLOOD BY AUTOMATED COUNT: 14 % (ref 11.6–15.1)
GFR SERPL CREATININE-BSD FRML MDRD: 64 ML/MIN/1.73SQ M
GLUCOSE P FAST SERPL-MCNC: 153 MG/DL (ref 65–99)
GLUCOSE SERPL-MCNC: 153 MG/DL (ref 65–140)
HCT VFR BLD AUTO: 37.8 % (ref 36.5–49.3)
HGB BLD-MCNC: 12.4 G/DL (ref 12–17)
INR PPP: 0.95 (ref 0.84–1.19)
MCH RBC QN AUTO: 30.2 PG (ref 26.8–34.3)
MCHC RBC AUTO-ENTMCNC: 32.8 G/DL (ref 31.4–37.4)
MCV RBC AUTO: 92 FL (ref 82–98)
PLATELET # BLD AUTO: 189 THOUSANDS/UL (ref 149–390)
PMV BLD AUTO: 10.7 FL (ref 8.9–12.7)
POTASSIUM SERPL-SCNC: 4.4 MMOL/L (ref 3.5–5.3)
PROTHROMBIN TIME: 12.8 SECONDS (ref 11.6–14.5)
RBC # BLD AUTO: 4.11 MILLION/UL (ref 3.88–5.62)
SODIUM SERPL-SCNC: 133 MMOL/L (ref 136–145)
WBC # BLD AUTO: 8.44 THOUSAND/UL (ref 4.31–10.16)

## 2019-12-02 PROCEDURE — 80048 BASIC METABOLIC PNL TOTAL CA: CPT | Performed by: RADIOLOGY

## 2019-12-02 PROCEDURE — 99152 MOD SED SAME PHYS/QHP 5/>YRS: CPT

## 2019-12-02 PROCEDURE — C1725 CATH, TRANSLUMIN NON-LASER: HCPCS

## 2019-12-02 PROCEDURE — 37225 PR REVSC OPN/PRQ FEM/POP W/ATHRC/ANGIOP SM VSL: CPT | Performed by: SURGERY

## 2019-12-02 PROCEDURE — C1769 GUIDE WIRE: HCPCS

## 2019-12-02 PROCEDURE — C1894 INTRO/SHEATH, NON-LASER: HCPCS

## 2019-12-02 PROCEDURE — 99152 MOD SED SAME PHYS/QHP 5/>YRS: CPT | Performed by: SURGERY

## 2019-12-02 PROCEDURE — 37225 HB FEM/POPL REVAS W/ATHER: CPT

## 2019-12-02 PROCEDURE — 99153 MOD SED SAME PHYS/QHP EA: CPT

## 2019-12-02 PROCEDURE — 1123F ACP DISCUSS/DSCN MKR DOCD: CPT | Performed by: SURGERY

## 2019-12-02 PROCEDURE — C1724 CATH, TRANS ATHEREC,ROTATION: HCPCS

## 2019-12-02 PROCEDURE — 85610 PROTHROMBIN TIME: CPT | Performed by: RADIOLOGY

## 2019-12-02 PROCEDURE — C1760 CLOSURE DEV, VASC: HCPCS

## 2019-12-02 PROCEDURE — 75710 ARTERY X-RAYS ARM/LEG: CPT

## 2019-12-02 PROCEDURE — 85027 COMPLETE CBC AUTOMATED: CPT | Performed by: RADIOLOGY

## 2019-12-02 RX ORDER — SODIUM CHLORIDE, SODIUM LACTATE, POTASSIUM CHLORIDE, CALCIUM CHLORIDE 600; 310; 30; 20 MG/100ML; MG/100ML; MG/100ML; MG/100ML
125 INJECTION, SOLUTION INTRAVENOUS CONTINUOUS
Status: DISPENSED | OUTPATIENT
Start: 2019-12-02 | End: 2019-12-02

## 2019-12-02 RX ORDER — LISINOPRIL 20 MG/1
20 TABLET ORAL 2 TIMES DAILY
COMMUNITY

## 2019-12-02 RX ORDER — FENTANYL CITRATE 50 UG/ML
INJECTION, SOLUTION INTRAMUSCULAR; INTRAVENOUS CODE/TRAUMA/SEDATION MEDICATION
Status: COMPLETED | OUTPATIENT
Start: 2019-12-02 | End: 2019-12-02

## 2019-12-02 RX ORDER — OXYCODONE HYDROCHLORIDE AND ACETAMINOPHEN 5; 325 MG/1; MG/1
1 TABLET ORAL EVERY 4 HOURS PRN
Status: DISCONTINUED | OUTPATIENT
Start: 2019-12-02 | End: 2019-12-03 | Stop reason: HOSPADM

## 2019-12-02 RX ORDER — MIDAZOLAM HYDROCHLORIDE 2 MG/2ML
INJECTION, SOLUTION INTRAMUSCULAR; INTRAVENOUS CODE/TRAUMA/SEDATION MEDICATION
Status: COMPLETED | OUTPATIENT
Start: 2019-12-02 | End: 2019-12-02

## 2019-12-02 RX ORDER — PROTAMINE SULFATE 10 MG/ML
INJECTION, SOLUTION INTRAVENOUS CODE/TRAUMA/SEDATION MEDICATION
Status: COMPLETED | OUTPATIENT
Start: 2019-12-02 | End: 2019-12-02

## 2019-12-02 RX ORDER — SODIUM CHLORIDE 9 MG/ML
50 INJECTION, SOLUTION INTRAVENOUS CONTINUOUS
Status: DISCONTINUED | OUTPATIENT
Start: 2019-12-02 | End: 2019-12-03 | Stop reason: HOSPADM

## 2019-12-02 RX ORDER — HEPARIN SODIUM 1000 [USP'U]/ML
INJECTION, SOLUTION INTRAVENOUS; SUBCUTANEOUS CODE/TRAUMA/SEDATION MEDICATION
Status: COMPLETED | OUTPATIENT
Start: 2019-12-02 | End: 2019-12-02

## 2019-12-02 RX ADMIN — FENTANYL CITRATE 50 MCG: 50 INJECTION INTRAMUSCULAR; INTRAVENOUS at 10:01

## 2019-12-02 RX ADMIN — IODIXANOL 58 ML: 320 INJECTION, SOLUTION INTRAVASCULAR at 10:44

## 2019-12-02 RX ADMIN — FENTANYL CITRATE 25 MCG: 50 INJECTION INTRAMUSCULAR; INTRAVENOUS at 09:43

## 2019-12-02 RX ADMIN — HEPARIN SODIUM 6000 UNITS: 1000 INJECTION INTRAVENOUS; SUBCUTANEOUS at 09:49

## 2019-12-02 RX ADMIN — SODIUM CHLORIDE 50 ML/HR: 0.9 INJECTION, SOLUTION INTRAVENOUS at 07:20

## 2019-12-02 RX ADMIN — MIDAZOLAM 1 MG: 1 INJECTION INTRAMUSCULAR; INTRAVENOUS at 08:50

## 2019-12-02 RX ADMIN — MIDAZOLAM 0.5 MG: 1 INJECTION INTRAMUSCULAR; INTRAVENOUS at 09:43

## 2019-12-02 RX ADMIN — MIDAZOLAM 0.5 MG: 1 INJECTION INTRAMUSCULAR; INTRAVENOUS at 09:17

## 2019-12-02 RX ADMIN — FENTANYL CITRATE 25 MCG: 50 INJECTION INTRAMUSCULAR; INTRAVENOUS at 10:32

## 2019-12-02 RX ADMIN — MIDAZOLAM 0.5 MG: 1 INJECTION INTRAMUSCULAR; INTRAVENOUS at 10:23

## 2019-12-02 RX ADMIN — FENTANYL CITRATE 50 MCG: 50 INJECTION INTRAMUSCULAR; INTRAVENOUS at 08:50

## 2019-12-02 RX ADMIN — FENTANYL CITRATE 25 MCG: 50 INJECTION INTRAMUSCULAR; INTRAVENOUS at 10:23

## 2019-12-02 RX ADMIN — PROTAMINE SULFATE 10 MG: 10 INJECTION, SOLUTION INTRAVENOUS at 10:51

## 2019-12-02 RX ADMIN — FENTANYL CITRATE 25 MCG: 50 INJECTION INTRAMUSCULAR; INTRAVENOUS at 09:17

## 2019-12-02 NOTE — NURSING NOTE
[facility]  Per report, bedrest initiated at 11:01  And Pt will remain on bedrest until 1600

## 2019-12-02 NOTE — SEDATION DOCUMENTATION
RLE arteriogram/intervention completed in IR by Dr Bekah Camacho and Dr Miroslava Noriega without complication  Bedrest until 1600 today  Patient without any complaints at present time

## 2019-12-02 NOTE — DISCHARGE INSTRUCTIONS
ARTERIOGRAM    WHAT YOU SHOULD KNOW:   An angiogram is a procedure to look at arteries in your body  Arteries are the blood vessels that carry blood from your heart to your body  AFTER YOU LEAVE:     Self-care:   · Limit activity: Rest for the remainder of the day of your procedure  Have some one with you until the next morning  Keep your arm or leg straight as much as possible  Rest as much as possible, sitting lying or reclining  Walk only to go to the bathroom, to bed or to eat  If the angiogram catheter was put in your leg, use the stairs as little as possible  No driving  · Keep your wound clean and dry  You may shower 24 hours after your procedure  The bandage you have on should fall off in 2-3 days  If there is any drainage from the puncture site, you should put on a clean bandage  · Watch for bleeding and bruising: It is normal to have a bruise and soreness where the angiogram catheter went in  · Diet:   · You may resume your regular diet, Sips of flat soda will help with mild nausea  · Drink more liquids than usual for the next 24 hours      · IMMEDIATELY Contact Interventional Radiology at [phone] Ovidio PATIENTS: Contact Interventional Radiology at [phone]) Lisbet Escobedo PATIENTS: Contact Interventional Radiology at [phone]) if any of the following occur:  · If your bruise gets larger or if you notice any active bleeding  APPLY DIRECT PRESSURE TO THE BLEEDING SITE  · If you notice increased swelling or have increased pain at the puncture site   · If you have any numbness or pain in the extremity of the puncture site   · If that extremity seems cold or pale      · You have fever greater than 101  · Persistent nausea or vomitting    Follow up with your primary healthcare provider  as directed: Write down your questions so you remember to ask them during your visits

## 2019-12-04 ENCOUNTER — OFFICE VISIT (OUTPATIENT)
Dept: PULMONOLOGY | Facility: MEDICAL CENTER | Age: 79
End: 2019-12-04
Payer: MEDICARE

## 2019-12-04 VITALS
DIASTOLIC BLOOD PRESSURE: 74 MMHG | OXYGEN SATURATION: 94 % | BODY MASS INDEX: 25.62 KG/M2 | RESPIRATION RATE: 12 BRPM | HEIGHT: 69 IN | HEART RATE: 65 BPM | SYSTOLIC BLOOD PRESSURE: 120 MMHG | TEMPERATURE: 97.6 F | WEIGHT: 173 LBS

## 2019-12-04 DIAGNOSIS — G47.33 OBSTRUCTIVE SLEEP APNEA: Primary | Chronic | ICD-10-CM

## 2019-12-04 DIAGNOSIS — J45.30 MILD PERSISTENT ASTHMA WITHOUT COMPLICATION: ICD-10-CM

## 2019-12-04 PROCEDURE — 99214 OFFICE O/P EST MOD 30 MIN: CPT | Performed by: INTERNAL MEDICINE

## 2019-12-04 RX ORDER — PREDNISONE 2.5 MG
2.5 TABLET ORAL DAILY
COMMUNITY
End: 2020-11-18 | Stop reason: ALTCHOICE

## 2019-12-04 RX ORDER — FLUTICASONE FUROATE 100 UG/1
POWDER RESPIRATORY (INHALATION)
Refills: 7 | COMMUNITY
Start: 2019-10-28 | End: 2021-02-15 | Stop reason: SDUPTHER

## 2019-12-04 NOTE — PROGRESS NOTES
Assessment/Plan        Problem List Items Addressed This Visit        Respiratory    Obstructive sleep apnea - Primary (Chronic)     Moderate STEFANY with good compliance to CPAP therapy  Initial home diagnostic sleep study done August 25, 2014 showed overall apnea-hypopnea index of 23  Average oxygen saturation was 91% with hardik of 73%  He is on auto CPAP set at  10-18 cm water  His average CPAP pressure was 17  Overall AHI was 7 4 which is in a decent range  He used the CPAP for average of 4 5 hours per night  I reviewed his compliance data from November 4 to December 3rd and he used to CPAP 80% of the time  Is not having any excessive daytime somnolence  Encouraged him to continue to use the CPAP every night    His medical supply company is [employer]  He does use a [employer] full cushion mask  Mild persistent asthma without complication     He is not having any significant cough or wheezing  He is doing very well on his Arnuity 100 mcg 1 puff daily and will continue this  He hardly needs to use his rescue albuterol inhaler  Continue Arnuity 100 mcg 1 puff daily         Relevant Medications    ARNUITY ELLIPTA 100 MCG/ACT AEPB inhaler            CC: Minimal cough, no shortness of breath    SEVERIANO       Mynor has moderate obstructive sleep apnea and presents for follow-up visit  He uses auto CPAP with pressure range of 10 18 cm water  His medical supply company is [employer]  He also has history of mild persistent asthma and had been using Arnuity 100 mcg 1 puff daily  Sulema Konstantin has peripheral arterial disease and on 12/02/2019 underwent PTA and stenting of right superficial femoral artery and politeal arterydone by by interventional radiologist, Dr Anant Vargas  Does also have history of CLL and is on ibrutinib  He had been receiving monthly gamma globulin infusions at [facility]   Has history of hypogammaglobulinemia    He does have history of hypertension and is on lisinopril 20 mg per day and amlodipine 10 mg per day  Also he takes Toprol 25 mg once a day  Florence Chaparro says that the Arnuity 100 mcg 1 puff daily is working well for him and really he is uses albuterol inhaler  Not have any wheezing now  He did have a mild cold few days ago but this seems to be better  He only has occasional cough from this  No fever or chills  He is having some pain and swelling of his fingers  He was placed on a tapering dose of prednisone and now is on 2 5 mg per day and this seems to be helping  Initially thought that this may be serum sickness but this did not coincide with the infusion of his gammaglobulin  Last infusion gammaglobulin was in early October  He has been following with rheumatologist Dr Makayla Ibanez  He does have follow-up with this hematologist Dr Marily Luu in December of this year    He is not having any shortness of breath and he denies any excessive daytime somnolence  He sometimes has insomnia and difficulty falling asleep  Does use a dream where full cushion mask and does like this mask  He is due for a new CPAP machine as his machine is over 11years old now and his medical supply company is [employer]  Past Medical History:   Diagnosis Date    Arthritis     BPH (benign prostatic hyperplasia)     Cancer (HCC)     Chronic lymphocytic leukemia (CLL), B-cell (HCC)     CPAP (continuous positive airway pressure) dependence     Depression     Hearing aid worn     bilateral    Hyperlipidemia     controlled    Hypertension     controlled    Sinusitis     Sleep apnea     CPAP    Tinnitus     Wears glasses        Past Surgical History:   Procedure Laterality Date    BACK SURGERY  09/2017    laminotomy L3,4 S1-    CARPAL TUNNEL RELEASE Bilateral     CATARACT EXTRACTION      left    CATARACT EXTRACTION W/ INTRAOCULAR LENS IMPLANT Left 12/11/2017    Procedure: EXTRACTION EXTRACAPSULAR CATARACT PHACO INTRAOCULAR LENS (IOL);   Surgeon: Humberto Joy MD;  Location: [facility] MAIN OR; Service: Ophthalmology    CERVICAL DISCECTOMY  1982    C5-6    COLONOSCOPY      HERNIA REPAIR      l inguinal    IR LOWER EXTREMITY / INTERVENTION  2019    ROTATOR CUFF REPAIR Left 2006    TONSILLECTOMY           Current Outpatient Medications:     amLODIPine (NORVASC) 10 mg tablet, Take 10 mg by mouth every morning, Disp: , Rfl:     ARNUITY ELLIPTA 100 MCG/ACT AEPB inhaler, INHALE 1 PUFF BY MOUTH EVERY DAY RINSE MOUTH AFTER USE, Disp: , Rfl: 7    buPROPion (WELLBUTRIN XL) 300 mg 24 hr tablet, Take 300 mg by mouth every morning, Disp: , Rfl:     fluticasone (FLONASE) 50 mcg/act nasal spray, 1 spray into each nostril as needed for rhinitis, Disp: , Rfl:     Ibrutinib 140 MG CAPS, Take by mouth every morning, Disp: , Rfl:     Immune Globulin, Human, 30 GM/300ML SOLN, Inject as directed Every 6 weeks-IV, Disp: , Rfl:     lisinopril (ZESTRIL) 20 mg tablet, Take 20 mg by mouth 2 (two) times a day, Disp: , Rfl:     metoprolol succinate (TOPROL-XL) 25 mg 24 hr tablet, Take 37 5 mg by mouth every morning , Disp: , Rfl:     Multiple Vitamins-Minerals (PRESERVISION AREDS 2 PO), Take by mouth 2 (two) times a day, Disp: , Rfl:     other medication, see sig,, 2 5 mg daily See below, Disp: , Rfl:     oxyCODONE-acetaminophen (PERCOCET) 5-325 mg per tablet, Take 1 tablet by mouth every 4 (four) hours as needed, Disp: , Rfl: 0    predniSONE 2 5 mg tablet, Take 2 5 mg by mouth daily, Disp: , Rfl:     rosuvastatin (CRESTOR) 10 MG tablet, Take 10 mg by mouth every morning, Disp: , Rfl:     tadalafil (CIALIS) 5 MG tablet, , Disp: , Rfl:     Allergies   Allergen Reactions    Rocephin [Ceftriaxone]      Avoids d/t previous liver toxicity       Social History     Tobacco Use    Smoking status: Former Smoker     Packs/day:      Years: 20 00     Pack years: 20      Last attempt to quit:      Years since quittin 9    Smokeless tobacco: Never Used   Substance Use Topics    Alcohol use:  Yes Alcohol/week: 7 0 standard drinks     Types: 7 Glasses of wine per week         Family History   Problem Relation Age of Onset    Cancer Mother         bladder    Heart disease Father     Stroke Father     Parkinsonism Brother        Review of Systems   Constitutional: Negative for activity change, appetite change and fever  HENT: Negative for congestion and sinus pressure  Eyes: Negative for redness  Respiratory: Negative for shortness of breath and wheezing  Cardiovascular: Negative for chest pain  Gastrointestinal: Negative for abdominal pain  Endocrine: Negative for polydipsia and polyphagia  Musculoskeletal: Negative for myalgias  Neurological: Negative for light-headedness  Psychiatric/Behavioral: Negative for decreased concentration  Vitals:    12/04/19 1020   BP: 120/74   Pulse: 65   Resp: 12   Temp: 97 6 °F (36 4 °C)   SpO2: 94%           Physical Exam   Constitutional: He is oriented to person, place, and time  He appears well-developed and well-nourished  No distress  HENT:   Head: Normocephalic  Nose: Nose normal    Mouth/Throat: Oropharynx is clear and moist  No oropharyngeal exudate  Eyes: Pupils are equal, round, and reactive to light  Conjunctivae are normal    Neck: Neck supple  No JVD present  Cardiovascular: Normal rate, regular rhythm and normal heart sounds  Pulmonary/Chest: Effort normal    Clear  No wheezing, crackles, or rhonchi  Abdominal: Soft  He exhibits no distension  There is no tenderness  Musculoskeletal: He exhibits no edema  Lymphadenopathy:     He has no cervical adenopathy  Neurological: He is alert and oriented to person, place, and time  Skin: Skin is warm and dry  Psychiatric: He has a normal mood and affect

## 2019-12-09 NOTE — ASSESSMENT & PLAN NOTE
Moderate STEFANY with good compliance to CPAP therapy  Initial home diagnostic sleep study done August 25, 2014 showed overall apnea-hypopnea index of 23  Average oxygen saturation was 91% with hardik of 73%  He is on auto CPAP set at  10-18 cm water  His average CPAP pressure was 17  Overall AHI was 7 4 which is in a decent range  He used the CPAP for average of 4 5 hours per night  I reviewed his compliance data from November 4 to December 3rd and he used to CPAP 80% of the time  Is not having any excessive daytime somnolence  Encouraged him to continue to use the CPAP every night    His medical supply company is [employer]  He does use a "[employer]ware full cushion mask

## 2019-12-09 NOTE — ASSESSMENT & PLAN NOTE
He is not having any significant cough or wheezing  He is doing very well on his Arnuity 100 mcg 1 puff daily and will continue this  He hardly needs to use his rescue albuterol inhaler      Continue Arnuity 100 mcg 1 puff daily

## 2019-12-19 ENCOUNTER — OFFICE VISIT (OUTPATIENT)
Dept: VASCULAR SURGERY | Facility: CLINIC | Age: 79
End: 2019-12-19
Payer: MEDICARE

## 2019-12-19 VITALS
BODY MASS INDEX: 23.91 KG/M2 | WEIGHT: 167 LBS | SYSTOLIC BLOOD PRESSURE: 104 MMHG | DIASTOLIC BLOOD PRESSURE: 56 MMHG | HEART RATE: 60 BPM | TEMPERATURE: 98.1 F | HEIGHT: 70 IN

## 2019-12-19 DIAGNOSIS — I70.211: Primary | ICD-10-CM

## 2019-12-19 PROCEDURE — 99213 OFFICE O/P EST LOW 20 MIN: CPT | Performed by: SURGERY

## 2019-12-19 NOTE — PROGRESS NOTES
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, right leg ([address] )  Doing well almost 3 weeks status post right leg arteriogram with atherectomy and balloon angioplasty of multiple stenosis in the SFA and popliteal   He is doing well with cessation of claudication symptoms is able to walk long distances at this time with no claudication  His access site in the right groin is stable with no evidence of hematoma or ecchymosis  Plan:  Follow-up lower extremity arterial study in 6 months     I did ask him to add 81 milligrams of aspirin daily if there is increase in ecchymosis then he can stop that  Diagnoses and all orders for this visit:    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, right leg (HCC)  -     VAS lower limb arterial duplex, complete bilateral; Future        Subjective:      Patient ID: Shawanda Castillo is a 70 y o  male  HPI history of disabling claudication right lower extremity underwent arteriogram with atherectomy and balloon angioplasty with excellent luminal gain  Cessation of his it claudication symptoms with no untoward effects from his arteriogram     The following portions of the patient's history were reviewed and updated as appropriate: allergies, current medications, past family history, past medical history, past social history, past surgical history and problem list     Review of Systems  claudication, recent arteriogram, no GI  symptoms, no constitutional symptoms or skin rash, all other systems negative    Objective:      /56 (BP Location: Right arm, Patient Position: Sitting)   Pulse 60   Temp 98 1 °F (36 7 °C) (Tympanic)   Ht 5' 10" (1 778 m)   Wt 75 8 kg (167 lb)   BMI 23 96 kg/m²          Physical Exam      Easily palpable bilateral femoral popliteal dorsalis pedis pulses, right groin prograde access shows no evidence of hematoma or ecchymosis    I have reviewed and made appropriate changes to the review of systems input by the medical assistant  Vitals:    19 0936   BP: 104/56   BP Location: Right arm   Patient Position: Sitting   Pulse: 60   Temp: 98 1 °F (36 7 °C)   TempSrc: Tympanic   Weight: 75 8 kg (167 lb)   Height: 5' 10" (1 778 m)       Patient Active Problem List   Diagnosis    Mild persistent asthma without complication    Cough    Obstructive sleep apnea    Chronic lymphocytic leukemia (CLL) genetic mutation variant (HCC)    Hyperlipidemia    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, right leg ([address] )    Hypertension       Past Surgical History:   Procedure Laterality Date    BACK SURGERY  2017    laminotomy L3,4 S1-    CARPAL TUNNEL RELEASE Bilateral     CATARACT EXTRACTION      left    CATARACT EXTRACTION W/ INTRAOCULAR LENS IMPLANT Left 2017    Procedure: EXTRACTION EXTRACAPSULAR CATARACT PHACO INTRAOCULAR LENS (IOL);   Surgeon: Fidel Umanzor MD;  Location: [facility] MAIN OR;  Service: Ophthalmology    CERVICAL DISCECTOMY      C5-6    COLONOSCOPY      HERNIA REPAIR      l inguinal    IR LOWER EXTREMITY / INTERVENTION  2019    ROTATOR CUFF REPAIR Left 2006    TONSILLECTOMY         Family History   Problem Relation Age of Onset   Selvin Hoops Cancer Mother         bladder    Heart disease Father     Stroke Father     Parkinsonism Brother        Social History     Socioeconomic History    Marital status: /Civil Union     Spouse name: Not on file    Number of children: Not on file    Years of education: Not on file    Highest education level: Not on file   Occupational History    Not on file   Social Needs    Financial resource strain: Not on file    Food insecurity:     Worry: Not on file     Inability: Not on file    Transportation needs:     Medical: Not on file     Non-medical: Not on file   Tobacco Use    Smoking status: Former Smoker     Packs/day: 1 00     Years: 20 00     Pack years: 20 00     Last attempt to quit:      Years since quittin 9    Smokeless tobacco: Never Used   Substance and Sexual Activity    Alcohol use:  Yes     Alcohol/week: 7 0 standard drinks     Types: 7 Glasses of wine per week    Drug use: No    Sexual activity: Yes     Partners: Female   Lifestyle    Physical activity:     Days per week: Not on file     Minutes per session: Not on file    Stress: Not on file   Relationships    Social connections:     Talks on phone: Not on file     Gets together: Not on file     Attends [employer] service: Not on file     Active member of club or organization: Not on file     Attends meetings of clubs or organizations: Not on file     Relationship status: Not on file    Intimate partner violence:     Fear of current or ex partner: Not on file     Emotionally abused: Not on file     Physically abused: Not on file     Forced sexual activity: Not on file   Other Topics Concern    Not on file   Social History Narrative    Not on file       Allergies   Allergen Reactions    Rocephin [Ceftriaxone]      Avoids d/t previous liver toxicity         Current Outpatient Medications:     amLODIPine (NORVASC) 10 mg tablet, Take 10 mg by mouth every morning, Disp: , Rfl:     ARNUITY ELLIPTA 100 MCG/ACT AEPB inhaler, INHALE 1 PUFF BY MOUTH EVERY DAY RINSE MOUTH AFTER USE, Disp: , Rfl: 7    buPROPion (WELLBUTRIN XL) 300 mg 24 hr tablet, Take 300 mg by mouth every morning, Disp: , Rfl:     fluticasone (FLONASE) 50 mcg/act nasal spray, 1 spray into each nostril as needed for rhinitis, Disp: , Rfl:     Ibrutinib 140 MG CAPS, Take by mouth every morning, Disp: , Rfl:     Immune Globulin, Human, 30 GM/300ML SOLN, Inject as directed Every 6 weeks-IV, Disp: , Rfl:     lisinopril (ZESTRIL) 20 mg tablet, Take 20 mg by mouth 2 (two) times a day, Disp: , Rfl:     metoprolol succinate (TOPROL-XL) 25 mg 24 hr tablet, Take 37 5 mg by mouth every morning , Disp: , Rfl:     Multiple Vitamins-Minerals (PRESERVISION AREDS 2 PO), Take by mouth 2 (two) times a day, Disp: , Rfl:    other medication, see sig,, 2 5 mg daily See below, Disp: , Rfl:     oxyCODONE-acetaminophen (PERCOCET) 5-325 mg per tablet, Take 1 tablet by mouth every 4 (four) hours as needed, Disp: , Rfl: 0    predniSONE 2 5 mg tablet, Take 2 5 mg by mouth daily, Disp: , Rfl:     rosuvastatin (CRESTOR) 10 MG tablet, Take 10 mg by mouth every morning, Disp: , Rfl:     tadalafil (CIALIS) 5 MG tablet, , Disp: , Rfl:

## 2019-12-19 NOTE — LETTER
December 19, 2019     Magalys Garza MD  [address] [place] [id number]    Patient: Sanaz Thomas   YOB: 1940   Date of Visit: 12/19/2019       Dear Dr Violeta Ibarra: Thank you for referring Mikie Reinoso to me for evaluation  Below are my notes for this consultation  If you have questions, please do not hesitate to call me  I look forward to following your patient along with you           Sincerely,        Marbella Ashraf MD        CC: No Recipients

## 2019-12-19 NOTE — PATIENT INSTRUCTIONS
Doing well almost 3 weeks status post right leg arteriogram with atherectomy and balloon angioplasty of multiple stenosis in the SFA and popliteal   He is doing well with cessation of claudication symptoms is able to walk long distances at this time with no claudication  His access site in the right groin is stable with no evidence of hematoma or ecchymosis      Plan:  Follow-up lower extremity arterial study in 6 months

## 2020-05-21 ENCOUNTER — TELEPHONE (OUTPATIENT)
Dept: PULMONOLOGY | Facility: MEDICAL CENTER | Age: 80
End: 2020-05-21

## 2020-05-22 ENCOUNTER — TELEMEDICINE (OUTPATIENT)
Dept: PULMONOLOGY | Facility: MEDICAL CENTER | Age: 80
End: 2020-05-22

## 2020-05-22 DIAGNOSIS — J45.30 MILD PERSISTENT ASTHMA WITHOUT COMPLICATION: ICD-10-CM

## 2020-05-22 DIAGNOSIS — G47.33 OBSTRUCTIVE SLEEP APNEA: Primary | Chronic | ICD-10-CM

## 2020-05-22 PROCEDURE — 99442 PR PHYS/QHP TELEPHONE EVALUATION 11-20 MIN: CPT | Performed by: INTERNAL MEDICINE

## 2020-09-29 DIAGNOSIS — I70.211: Primary | ICD-10-CM

## 2020-10-08 ENCOUNTER — TRANSCRIBE ORDERS (OUTPATIENT)
Dept: ADMINISTRATIVE | Facility: HOSPITAL | Age: 80
End: 2020-10-08

## 2020-10-08 ENCOUNTER — LAB (OUTPATIENT)
Dept: LAB | Facility: HOSPITAL | Age: 80
End: 2020-10-08
Attending: INTERNAL MEDICINE
Payer: MEDICARE

## 2020-10-08 DIAGNOSIS — I10 HYPERTENSION, UNSPECIFIED TYPE: ICD-10-CM

## 2020-10-08 DIAGNOSIS — K52.89 OTHER SPECIFIED NONINFECTIVE GASTROENTERITIS AND COLITIS: ICD-10-CM

## 2020-10-08 DIAGNOSIS — C91.12 CHRONIC LYMPHOCYTIC LEUKEMIA OF B-CELL TYPE IN RELAPSE (HCC): ICD-10-CM

## 2020-10-08 DIAGNOSIS — E11.9 DIABETES MELLITUS WITHOUT COMPLICATION (HCC): ICD-10-CM

## 2020-10-08 DIAGNOSIS — K52.89 OTHER SPECIFIED NONINFECTIVE GASTROENTERITIS AND COLITIS: Primary | ICD-10-CM

## 2020-10-08 DIAGNOSIS — E78.49 FAMILIAL COMBINED HYPERLIPIDEMIA: ICD-10-CM

## 2020-10-08 DIAGNOSIS — I48.0 PAROXYSMAL ATRIAL FIBRILLATION (HCC): ICD-10-CM

## 2020-10-08 LAB
ALBUMIN SERPL BCP-MCNC: 4.3 G/DL (ref 3.4–4.8)
ALP SERPL-CCNC: 47 U/L (ref 10–129)
ALT SERPL W P-5'-P-CCNC: 29 U/L (ref 5–63)
ANION GAP SERPL CALCULATED.3IONS-SCNC: 8 MMOL/L (ref 4–13)
AST SERPL W P-5'-P-CCNC: 25 U/L (ref 15–41)
BASOPHILS # BLD AUTO: 0.02 THOUSANDS/ΜL (ref 0–0.1)
BASOPHILS NFR BLD AUTO: 0 % (ref 0–1)
BILIRUB SERPL-MCNC: 0.91 MG/DL (ref 0.3–1.2)
BUN SERPL-MCNC: 23 MG/DL (ref 6–20)
CALCIUM SERPL-MCNC: 9.6 MG/DL (ref 8.4–10.2)
CHLORIDE SERPL-SCNC: 103 MMOL/L (ref 96–108)
CHOLEST SERPL-MCNC: 144 MG/DL
CO2 SERPL-SCNC: 26 MMOL/L (ref 22–33)
CREAT SERPL-MCNC: 1.14 MG/DL (ref 0.5–1.2)
EOSINOPHIL # BLD AUTO: 0.06 THOUSAND/ΜL (ref 0–0.61)
EOSINOPHIL NFR BLD AUTO: 1 % (ref 0–6)
ERYTHROCYTE [DISTWIDTH] IN BLOOD BY AUTOMATED COUNT: 15.2 % (ref 11.6–15.1)
GFR SERPL CREATININE-BSD FRML MDRD: 62 ML/MIN/1.73SQ M
GLUCOSE P FAST SERPL-MCNC: 124 MG/DL (ref 70–100)
HCT VFR BLD AUTO: 40.3 % (ref 36.5–49.3)
HDLC SERPL-MCNC: 63 MG/DL
HEMOCCULT STL QL IA: POSITIVE
HGB BLD-MCNC: 13.5 G/DL (ref 12–17)
IMM GRANULOCYTES # BLD AUTO: 0.03 THOUSAND/UL (ref 0–0.2)
IMM GRANULOCYTES NFR BLD AUTO: 0 % (ref 0–2)
LDLC SERPL CALC-MCNC: 68 MG/DL (ref 0–100)
LYMPHOCYTES # BLD AUTO: 2.23 THOUSANDS/ΜL (ref 0.6–4.47)
LYMPHOCYTES NFR BLD AUTO: 24 % (ref 14–44)
MCH RBC QN AUTO: 30.1 PG (ref 26.8–34.3)
MCHC RBC AUTO-ENTMCNC: 33.5 G/DL (ref 31.4–37.4)
MCV RBC AUTO: 90 FL (ref 82–98)
MONOCYTES # BLD AUTO: 0.98 THOUSAND/ΜL (ref 0.17–1.22)
MONOCYTES NFR BLD AUTO: 11 % (ref 4–12)
NEUTROPHILS # BLD AUTO: 6 THOUSANDS/ΜL (ref 1.85–7.62)
NEUTS SEG NFR BLD AUTO: 64 % (ref 43–75)
NONHDLC SERPL-MCNC: 81 MG/DL
PLATELET # BLD AUTO: 207 THOUSANDS/UL (ref 149–390)
PMV BLD AUTO: 11.7 FL (ref 8.9–12.7)
POTASSIUM SERPL-SCNC: 4.3 MMOL/L (ref 3.5–5)
PROT SERPL-MCNC: 7 G/DL (ref 6.4–8.3)
RBC # BLD AUTO: 4.49 MILLION/UL (ref 3.88–5.62)
SODIUM SERPL-SCNC: 137 MMOL/L (ref 133–145)
TRIGL SERPL-MCNC: 63 MG/DL
TSH SERPL DL<=0.05 MIU/L-ACNC: 1.14 UIU/ML (ref 0.34–5.6)
WBC # BLD AUTO: 9.32 THOUSAND/UL (ref 4.31–10.16)

## 2020-10-08 PROCEDURE — 84443 ASSAY THYROID STIM HORMONE: CPT

## 2020-10-08 PROCEDURE — 80053 COMPREHEN METABOLIC PANEL: CPT

## 2020-10-08 PROCEDURE — 85025 COMPLETE CBC W/AUTO DIFF WBC: CPT

## 2020-10-08 PROCEDURE — G0328 FECAL BLOOD SCRN IMMUNOASSAY: HCPCS | Performed by: INTERNAL MEDICINE

## 2020-10-08 PROCEDURE — 83036 HEMOGLOBIN GLYCOSYLATED A1C: CPT

## 2020-10-08 PROCEDURE — 36415 COLL VENOUS BLD VENIPUNCTURE: CPT

## 2020-10-08 PROCEDURE — 80061 LIPID PANEL: CPT

## 2020-10-09 LAB
EST. AVERAGE GLUCOSE BLD GHB EST-MCNC: 120 MG/DL
HBA1C MFR BLD: 5.8 %

## 2020-11-02 ENCOUNTER — HOSPITAL ENCOUNTER (OUTPATIENT)
Dept: NON INVASIVE DIAGNOSTICS | Facility: CLINIC | Age: 80
Discharge: HOME | End: 2020-11-02
Payer: MEDICARE

## 2020-11-02 DIAGNOSIS — I70.211: ICD-10-CM

## 2020-11-02 PROCEDURE — 93925 LOWER EXTREMITY STUDY: CPT

## 2020-11-02 PROCEDURE — 93923 UPR/LXTR ART STDY 3+ LVLS: CPT

## 2020-11-03 PROCEDURE — 93922 UPR/L XTREMITY ART 2 LEVELS: CPT | Performed by: SURGERY

## 2020-11-03 PROCEDURE — 93925 LOWER EXTREMITY STUDY: CPT | Performed by: SURGERY

## 2020-11-13 ENCOUNTER — APPOINTMENT (EMERGENCY)
Dept: RADIOLOGY | Facility: HOSPITAL | Age: 80
End: 2020-11-13
Payer: MEDICARE

## 2020-11-13 ENCOUNTER — HOSPITAL ENCOUNTER (EMERGENCY)
Facility: HOSPITAL | Age: 80
Discharge: HOME | End: 2020-11-13
Attending: EMERGENCY MEDICINE
Payer: MEDICARE

## 2020-11-13 VITALS
DIASTOLIC BLOOD PRESSURE: 77 MMHG | OXYGEN SATURATION: 97 % | WEIGHT: 165 LBS | RESPIRATION RATE: 18 BRPM | BODY MASS INDEX: 23.62 KG/M2 | SYSTOLIC BLOOD PRESSURE: 145 MMHG | TEMPERATURE: 97.6 F | HEIGHT: 70 IN | HEART RATE: 58 BPM

## 2020-11-13 DIAGNOSIS — R00.2 PALPITATIONS: Primary | ICD-10-CM

## 2020-11-13 PROCEDURE — 99285 EMERGENCY DEPT VISIT HI MDM: CPT | Performed by: EMERGENCY MEDICINE

## 2020-11-13 PROCEDURE — 71045 X-RAY EXAM CHEST 1 VIEW: CPT

## 2020-11-13 PROCEDURE — 99285 EMERGENCY DEPT VISIT HI MDM: CPT

## 2020-11-13 PROCEDURE — 93005 ELECTROCARDIOGRAM TRACING: CPT

## 2020-11-16 LAB
ATRIAL RATE: 76 BPM
P AXIS: 73 DEGREES
PR INTERVAL: 164 MS
QRS AXIS: 78 DEGREES
QRSD INTERVAL: 102 MS
QT INTERVAL: 374 MS
QTC INTERVAL: 413 MS
T WAVE AXIS: 66 DEGREES
VENTRICULAR RATE: 73 BPM

## 2020-11-16 PROCEDURE — 93010 ELECTROCARDIOGRAM REPORT: CPT | Performed by: INTERNAL MEDICINE

## 2020-11-17 ENCOUNTER — TELEPHONE (OUTPATIENT)
Dept: SURGICAL ONCOLOGY | Facility: CLINIC | Age: 80
End: 2020-11-17

## 2020-11-18 ENCOUNTER — CONSULT (OUTPATIENT)
Dept: HEMATOLOGY ONCOLOGY | Facility: CLINIC | Age: 80
End: 2020-11-18
Payer: MEDICARE

## 2020-11-18 VITALS
OXYGEN SATURATION: 97 % | HEART RATE: 81 BPM | DIASTOLIC BLOOD PRESSURE: 70 MMHG | TEMPERATURE: 98.3 F | SYSTOLIC BLOOD PRESSURE: 128 MMHG | RESPIRATION RATE: 14 BRPM | HEIGHT: 70 IN | BODY MASS INDEX: 23.91 KG/M2 | WEIGHT: 167 LBS

## 2020-11-18 DIAGNOSIS — I10 HYPERTENSION, UNSPECIFIED TYPE: ICD-10-CM

## 2020-11-18 DIAGNOSIS — E78.5 HYPERLIPIDEMIA, UNSPECIFIED HYPERLIPIDEMIA TYPE: ICD-10-CM

## 2020-11-18 DIAGNOSIS — C91.10 CLL (CHRONIC LYMPHOCYTIC LEUKEMIA) (HCC): Primary | ICD-10-CM

## 2020-11-18 DIAGNOSIS — G47.33 OBSTRUCTIVE SLEEP APNEA: Chronic | ICD-10-CM

## 2020-11-18 DIAGNOSIS — D80.3 IGG DEFICIENCY (HCC): ICD-10-CM

## 2020-11-18 PROCEDURE — 99204 OFFICE O/P NEW MOD 45 MIN: CPT | Performed by: INTERNAL MEDICINE

## 2020-11-18 RX ORDER — ACETAMINOPHEN 160 MG
2000 TABLET,DISINTEGRATING ORAL DAILY
COMMUNITY
End: 2022-06-21

## 2020-11-20 ENCOUNTER — TELEPHONE (OUTPATIENT)
Dept: OTHER | Facility: HOSPITAL | Age: 80
End: 2020-11-20

## 2020-11-20 ENCOUNTER — TELEPHONE (OUTPATIENT)
Dept: PULMONOLOGY | Facility: MEDICAL CENTER | Age: 80
End: 2020-11-20

## 2020-11-20 ENCOUNTER — LAB (OUTPATIENT)
Dept: LAB | Facility: CLINIC | Age: 80
End: 2020-11-20
Payer: MEDICARE

## 2020-11-20 ENCOUNTER — TRANSCRIBE ORDERS (OUTPATIENT)
Dept: LAB | Facility: CLINIC | Age: 80
End: 2020-11-20

## 2020-11-20 ENCOUNTER — TELEPHONE (OUTPATIENT)
Dept: HEMATOLOGY ONCOLOGY | Facility: CLINIC | Age: 80
End: 2020-11-20

## 2020-11-20 DIAGNOSIS — E78.2 MIXED HYPERLIPIDEMIA: Primary | ICD-10-CM

## 2020-11-20 DIAGNOSIS — D80.3 IGG DEFICIENCY (HCC): ICD-10-CM

## 2020-11-20 DIAGNOSIS — E78.2 MIXED HYPERLIPIDEMIA: ICD-10-CM

## 2020-11-20 DIAGNOSIS — C91.10 CLL (CHRONIC LYMPHOCYTIC LEUKEMIA) (HCC): ICD-10-CM

## 2020-11-20 LAB
ALBUMIN SERPL BCP-MCNC: 3.9 G/DL (ref 3.5–5)
ALP SERPL-CCNC: 69 U/L (ref 46–116)
ALT SERPL W P-5'-P-CCNC: 30 U/L (ref 12–78)
ANION GAP SERPL CALCULATED.3IONS-SCNC: 9 MMOL/L (ref 4–13)
AST SERPL W P-5'-P-CCNC: 20 U/L (ref 5–45)
BASOPHILS # BLD AUTO: 0.02 THOUSANDS/ΜL (ref 0–0.1)
BASOPHILS NFR BLD AUTO: 0 % (ref 0–1)
BILIRUB SERPL-MCNC: 0.79 MG/DL (ref 0.2–1)
BUN SERPL-MCNC: 17 MG/DL (ref 5–25)
CALCIUM SERPL-MCNC: 8.9 MG/DL (ref 8.3–10.1)
CHLORIDE SERPL-SCNC: 98 MMOL/L (ref 100–108)
CK SERPL-CCNC: 132 U/L (ref 39–308)
CO2 SERPL-SCNC: 27 MMOL/L (ref 21–32)
CREAT SERPL-MCNC: 1.1 MG/DL (ref 0.6–1.3)
EOSINOPHIL # BLD AUTO: 0.03 THOUSAND/ΜL (ref 0–0.61)
EOSINOPHIL NFR BLD AUTO: 1 % (ref 0–6)
ERYTHROCYTE [DISTWIDTH] IN BLOOD BY AUTOMATED COUNT: 13.8 % (ref 11.6–15.1)
GFR SERPL CREATININE-BSD FRML MDRD: 64 ML/MIN/1.73SQ M
GLUCOSE P FAST SERPL-MCNC: 106 MG/DL (ref 65–99)
HCT VFR BLD AUTO: 40.5 % (ref 36.5–49.3)
HGB BLD-MCNC: 12.9 G/DL (ref 12–17)
IGA SERPL-MCNC: 26 MG/DL (ref 70–400)
IGG SERPL-MCNC: 569 MG/DL (ref 700–1600)
IGM SERPL-MCNC: 34 MG/DL (ref 40–230)
IMM GRANULOCYTES # BLD AUTO: 0.02 THOUSAND/UL (ref 0–0.2)
IMM GRANULOCYTES NFR BLD AUTO: 0 % (ref 0–2)
LDH SERPL-CCNC: 208 U/L (ref 81–234)
LDLC SERPL DIRECT ASSAY-MCNC: 62 MG/DL (ref 0–100)
LYMPHOCYTES # BLD AUTO: 0.93 THOUSANDS/ΜL (ref 0.6–4.47)
LYMPHOCYTES NFR BLD AUTO: 18 % (ref 14–44)
MCH RBC QN AUTO: 29.7 PG (ref 26.8–34.3)
MCHC RBC AUTO-ENTMCNC: 31.9 G/DL (ref 31.4–37.4)
MCV RBC AUTO: 93 FL (ref 82–98)
MONOCYTES # BLD AUTO: 1.15 THOUSAND/ΜL (ref 0.17–1.22)
MONOCYTES NFR BLD AUTO: 22 % (ref 4–12)
NEUTROPHILS # BLD AUTO: 3.12 THOUSANDS/ΜL (ref 1.85–7.62)
NEUTS SEG NFR BLD AUTO: 59 % (ref 43–75)
NRBC BLD AUTO-RTO: 0 /100 WBCS
PLATELET # BLD AUTO: 133 THOUSANDS/UL (ref 149–390)
PMV BLD AUTO: 11 FL (ref 8.9–12.7)
POTASSIUM SERPL-SCNC: 4.3 MMOL/L (ref 3.5–5.3)
PROT SERPL-MCNC: 7 G/DL (ref 6.4–8.2)
RBC # BLD AUTO: 4.35 MILLION/UL (ref 3.88–5.62)
SODIUM SERPL-SCNC: 134 MMOL/L (ref 136–145)
URATE SERPL-MCNC: 6 MG/DL (ref 4.2–8)
WBC # BLD AUTO: 5.27 THOUSAND/UL (ref 4.31–10.16)

## 2020-11-20 PROCEDURE — 82232 ASSAY OF BETA-2 PROTEIN: CPT

## 2020-11-20 PROCEDURE — 84550 ASSAY OF BLOOD/URIC ACID: CPT

## 2020-11-20 PROCEDURE — 80053 COMPREHEN METABOLIC PANEL: CPT

## 2020-11-20 PROCEDURE — 82550 ASSAY OF CK (CPK): CPT

## 2020-11-20 PROCEDURE — 85025 COMPLETE CBC W/AUTO DIFF WBC: CPT

## 2020-11-20 PROCEDURE — 82784 ASSAY IGA/IGD/IGG/IGM EACH: CPT

## 2020-11-20 PROCEDURE — 36415 COLL VENOUS BLD VENIPUNCTURE: CPT

## 2020-11-20 PROCEDURE — 83721 ASSAY OF BLOOD LIPOPROTEIN: CPT

## 2020-11-20 PROCEDURE — 83615 LACTATE (LD) (LDH) ENZYME: CPT

## 2020-11-22 LAB — B2 MICROGLOB SERPL-MCNC: 2 MG/L (ref 0.6–2.4)

## 2020-12-09 ENCOUNTER — TELEPHONE (OUTPATIENT)
Dept: HEMATOLOGY ONCOLOGY | Facility: CLINIC | Age: 80
End: 2020-12-09

## 2020-12-09 DIAGNOSIS — D80.3 IGG DEFICIENCY (HCC): Primary | ICD-10-CM

## 2020-12-10 DIAGNOSIS — C91.10 CLL (CHRONIC LYMPHOCYTIC LEUKEMIA) (HCC): ICD-10-CM

## 2020-12-10 DIAGNOSIS — D80.3 IGG DEFICIENCY (HCC): Primary | ICD-10-CM

## 2020-12-19 ENCOUNTER — APPOINTMENT (OUTPATIENT)
Dept: LAB | Age: 80
End: 2020-12-19
Payer: MEDICARE

## 2020-12-19 LAB
ALBUMIN SERPL BCP-MCNC: 3.9 G/DL (ref 3.5–5)
ALP SERPL-CCNC: 88 U/L (ref 46–116)
ALT SERPL W P-5'-P-CCNC: 37 U/L (ref 12–78)
ANION GAP SERPL CALCULATED.3IONS-SCNC: 4 MMOL/L (ref 4–13)
AST SERPL W P-5'-P-CCNC: 24 U/L (ref 5–45)
BASOPHILS # BLD AUTO: 0.02 THOUSANDS/ΜL (ref 0–0.1)
BASOPHILS NFR BLD AUTO: 0 % (ref 0–1)
BILIRUB SERPL-MCNC: 0.78 MG/DL (ref 0.2–1)
BUN SERPL-MCNC: 18 MG/DL (ref 5–25)
CALCIUM SERPL-MCNC: 9.4 MG/DL (ref 8.3–10.1)
CHLORIDE SERPL-SCNC: 98 MMOL/L (ref 100–108)
CO2 SERPL-SCNC: 30 MMOL/L (ref 21–32)
CREAT SERPL-MCNC: 1.11 MG/DL (ref 0.6–1.3)
EOSINOPHIL # BLD AUTO: 0.08 THOUSAND/ΜL (ref 0–0.61)
EOSINOPHIL NFR BLD AUTO: 1 % (ref 0–6)
ERYTHROCYTE [DISTWIDTH] IN BLOOD BY AUTOMATED COUNT: 13.7 % (ref 11.6–15.1)
GFR SERPL CREATININE-BSD FRML MDRD: 63 ML/MIN/1.73SQ M
GLUCOSE SERPL-MCNC: 215 MG/DL (ref 65–140)
HCT VFR BLD AUTO: 38.4 % (ref 36.5–49.3)
HGB BLD-MCNC: 12.5 G/DL (ref 12–17)
IGA SERPL-MCNC: 22 MG/DL (ref 70–400)
IGG SERPL-MCNC: 437 MG/DL (ref 700–1600)
IGM SERPL-MCNC: 29 MG/DL (ref 40–230)
IMM GRANULOCYTES # BLD AUTO: 0.02 THOUSAND/UL (ref 0–0.2)
IMM GRANULOCYTES NFR BLD AUTO: 0 % (ref 0–2)
LDH SERPL-CCNC: 172 U/L (ref 81–234)
LYMPHOCYTES # BLD AUTO: 1.53 THOUSANDS/ΜL (ref 0.6–4.47)
LYMPHOCYTES NFR BLD AUTO: 18 % (ref 14–44)
MCH RBC QN AUTO: 30.1 PG (ref 26.8–34.3)
MCHC RBC AUTO-ENTMCNC: 32.6 G/DL (ref 31.4–37.4)
MCV RBC AUTO: 93 FL (ref 82–98)
MONOCYTES # BLD AUTO: 1.03 THOUSAND/ΜL (ref 0.17–1.22)
MONOCYTES NFR BLD AUTO: 12 % (ref 4–12)
NEUTROPHILS # BLD AUTO: 5.67 THOUSANDS/ΜL (ref 1.85–7.62)
NEUTS SEG NFR BLD AUTO: 69 % (ref 43–75)
NRBC BLD AUTO-RTO: 0 /100 WBCS
PLATELET # BLD AUTO: 208 THOUSANDS/UL (ref 149–390)
PMV BLD AUTO: 10.7 FL (ref 8.9–12.7)
POTASSIUM SERPL-SCNC: 4.4 MMOL/L (ref 3.5–5.3)
PROT SERPL-MCNC: 6.8 G/DL (ref 6.4–8.2)
RBC # BLD AUTO: 4.15 MILLION/UL (ref 3.88–5.62)
SODIUM SERPL-SCNC: 132 MMOL/L (ref 136–145)
URATE SERPL-MCNC: 4.8 MG/DL (ref 4.2–8)
WBC # BLD AUTO: 8.35 THOUSAND/UL (ref 4.31–10.16)

## 2020-12-19 PROCEDURE — 82784 ASSAY IGA/IGD/IGG/IGM EACH: CPT

## 2020-12-19 PROCEDURE — 36415 COLL VENOUS BLD VENIPUNCTURE: CPT

## 2020-12-19 PROCEDURE — 83615 LACTATE (LD) (LDH) ENZYME: CPT

## 2020-12-19 PROCEDURE — 84550 ASSAY OF BLOOD/URIC ACID: CPT

## 2020-12-19 PROCEDURE — 80053 COMPREHEN METABOLIC PANEL: CPT

## 2020-12-19 PROCEDURE — 85025 COMPLETE CBC W/AUTO DIFF WBC: CPT

## 2020-12-21 ENCOUNTER — TELEPHONE (OUTPATIENT)
Dept: HEMATOLOGY ONCOLOGY | Facility: CLINIC | Age: 80
End: 2020-12-21

## 2020-12-22 ENCOUNTER — TELEPHONE (OUTPATIENT)
Dept: HEMATOLOGY ONCOLOGY | Facility: CLINIC | Age: 80
End: 2020-12-22

## 2020-12-22 DIAGNOSIS — D80.3 IGG DEFICIENCY (HCC): Primary | ICD-10-CM

## 2020-12-22 RX ORDER — SODIUM CHLORIDE 9 MG/ML
20 INJECTION, SOLUTION INTRAVENOUS ONCE
Status: CANCELLED | OUTPATIENT
Start: 2021-01-15

## 2020-12-22 RX ORDER — ACETAMINOPHEN 325 MG/1
650 TABLET ORAL ONCE
Status: CANCELLED | OUTPATIENT
Start: 2021-01-15

## 2020-12-22 RX ORDER — DIPHENHYDRAMINE HCL 25 MG
12.5 TABLET ORAL ONCE
Status: CANCELLED | OUTPATIENT
Start: 2021-01-15

## 2021-01-04 ENCOUNTER — HOSPITAL ENCOUNTER (OUTPATIENT)
Dept: RADIOLOGY | Facility: HOSPITAL | Age: 81
Discharge: HOME | End: 2021-01-04
Attending: INTERNAL MEDICINE
Payer: MEDICARE

## 2021-01-04 ENCOUNTER — TRANSCRIBE ORDERS (OUTPATIENT)
Dept: ADMINISTRATIVE | Facility: HOSPITAL | Age: 81
End: 2021-01-04

## 2021-01-04 ENCOUNTER — LAB (OUTPATIENT)
Dept: LAB | Facility: HOSPITAL | Age: 81
End: 2021-01-04
Attending: INTERNAL MEDICINE
Payer: MEDICARE

## 2021-01-04 DIAGNOSIS — R06.02 SHORTNESS OF BREATH: ICD-10-CM

## 2021-01-04 DIAGNOSIS — I10 ESSENTIAL HYPERTENSION, MALIGNANT: ICD-10-CM

## 2021-01-04 DIAGNOSIS — C91.12 CHRONIC LYMPHOCYTIC LEUKEMIA OF B-CELL TYPE IN RELAPSE (HCC): ICD-10-CM

## 2021-01-04 DIAGNOSIS — I48.0 PAROXYSMAL ATRIAL FIBRILLATION (HCC): ICD-10-CM

## 2021-01-04 DIAGNOSIS — E11.8 DIABETIC COMPLICATION (HCC): ICD-10-CM

## 2021-01-04 DIAGNOSIS — E11.8 DIABETIC COMPLICATION (HCC): Primary | ICD-10-CM

## 2021-01-04 LAB
APTT PPP: 26 SECONDS (ref 23–31)
BNP SERPL-MCNC: 32.8 PG/ML (ref 1–100)
CHOLEST SERPL-MCNC: 125 MG/DL
HDLC SERPL-MCNC: 56 MG/DL
INR PPP: 0.94 (ref 0.9–1.1)
LDLC SERPL CALC-MCNC: 53 MG/DL (ref 0–100)
NONHDLC SERPL-MCNC: 69 MG/DL
PROTHROMBIN TIME: 10.6 SECONDS (ref 9.5–12.1)
TRIGL SERPL-MCNC: 80.9 MG/DL
TSH SERPL DL<=0.05 MIU/L-ACNC: 1.06 UIU/ML (ref 0.34–5.6)

## 2021-01-04 PROCEDURE — 71046 X-RAY EXAM CHEST 2 VIEWS: CPT

## 2021-01-04 PROCEDURE — 83880 ASSAY OF NATRIURETIC PEPTIDE: CPT

## 2021-01-04 PROCEDURE — 80061 LIPID PANEL: CPT

## 2021-01-04 PROCEDURE — 85730 THROMBOPLASTIN TIME PARTIAL: CPT

## 2021-01-04 PROCEDURE — 84443 ASSAY THYROID STIM HORMONE: CPT

## 2021-01-04 PROCEDURE — 83036 HEMOGLOBIN GLYCOSYLATED A1C: CPT

## 2021-01-04 PROCEDURE — 85610 PROTHROMBIN TIME: CPT

## 2021-01-05 LAB
EST. AVERAGE GLUCOSE BLD GHB EST-MCNC: 123 MG/DL
HBA1C MFR BLD: 5.9 %

## 2021-01-08 ENCOUNTER — TRANSCRIBE ORDERS (OUTPATIENT)
Dept: ADMINISTRATIVE | Facility: HOSPITAL | Age: 81
End: 2021-01-08

## 2021-01-08 DIAGNOSIS — R06.02 SHORTNESS OF BREATH: Primary | ICD-10-CM

## 2021-01-15 ENCOUNTER — HOSPITAL ENCOUNTER (OUTPATIENT)
Dept: INFUSION CENTER | Facility: CLINIC | Age: 81
End: 2021-01-15

## 2021-01-15 ENCOUNTER — TELEPHONE (OUTPATIENT)
Dept: INFUSION CENTER | Facility: CLINIC | Age: 81
End: 2021-01-15

## 2021-01-19 DIAGNOSIS — D80.3 IGG DEFICIENCY (HCC): Primary | ICD-10-CM

## 2021-01-19 RX ORDER — DIPHENHYDRAMINE HCL 25 MG
12.5 TABLET ORAL ONCE
Status: CANCELLED | OUTPATIENT
Start: 2021-01-22

## 2021-01-19 RX ORDER — ACETAMINOPHEN 325 MG/1
650 TABLET ORAL ONCE
Status: CANCELLED | OUTPATIENT
Start: 2021-01-22

## 2021-01-19 RX ORDER — SODIUM CHLORIDE 9 MG/ML
20 INJECTION, SOLUTION INTRAVENOUS ONCE
Status: CANCELLED | OUTPATIENT
Start: 2021-01-22

## 2021-01-22 ENCOUNTER — HOSPITAL ENCOUNTER (OUTPATIENT)
Dept: INFUSION CENTER | Facility: CLINIC | Age: 81
Discharge: HOME | End: 2021-01-22
Payer: MEDICARE

## 2021-01-22 VITALS
BODY MASS INDEX: 23.82 KG/M2 | OXYGEN SATURATION: 95 % | WEIGHT: 166 LBS | DIASTOLIC BLOOD PRESSURE: 64 MMHG | HEART RATE: 65 BPM | SYSTOLIC BLOOD PRESSURE: 122 MMHG | TEMPERATURE: 96.4 F | RESPIRATION RATE: 16 BRPM

## 2021-01-22 DIAGNOSIS — D80.3 IGG DEFICIENCY (HCC): Primary | ICD-10-CM

## 2021-01-22 PROCEDURE — 96375 TX/PRO/DX INJ NEW DRUG ADDON: CPT

## 2021-01-22 PROCEDURE — 96366 THER/PROPH/DIAG IV INF ADDON: CPT

## 2021-01-22 PROCEDURE — 96365 THER/PROPH/DIAG IV INF INIT: CPT

## 2021-01-22 RX ORDER — ACETAMINOPHEN 325 MG/1
650 TABLET ORAL ONCE
Status: COMPLETED | OUTPATIENT
Start: 2021-01-22 | End: 2021-01-22

## 2021-01-22 RX ORDER — DIPHENHYDRAMINE HCL 25 MG
12.5 TABLET ORAL ONCE
Status: CANCELLED | OUTPATIENT
Start: 2021-03-19

## 2021-01-22 RX ORDER — SODIUM CHLORIDE 9 MG/ML
20 INJECTION, SOLUTION INTRAVENOUS ONCE
Status: COMPLETED | OUTPATIENT
Start: 2021-01-22 | End: 2021-01-22

## 2021-01-22 RX ORDER — DIPHENHYDRAMINE HCL 25 MG
12.5 TABLET ORAL ONCE
Status: COMPLETED | OUTPATIENT
Start: 2021-01-22 | End: 2021-01-22

## 2021-01-22 RX ORDER — ACETAMINOPHEN 325 MG/1
650 TABLET ORAL ONCE
Status: CANCELLED | OUTPATIENT
Start: 2021-03-19

## 2021-01-22 RX ORDER — SODIUM CHLORIDE 9 MG/ML
20 INJECTION, SOLUTION INTRAVENOUS ONCE
Status: CANCELLED | OUTPATIENT
Start: 2021-03-19

## 2021-01-22 RX ADMIN — ACETAMINOPHEN 650 MG: 325 TABLET, FILM COATED ORAL at 09:00

## 2021-01-22 RX ADMIN — SODIUM CHLORIDE 20 ML/HR: 0.9 INJECTION, SOLUTION INTRAVENOUS at 08:35

## 2021-01-22 RX ADMIN — HYDROCORTISONE SODIUM SUCCINATE 100 MG: 100 INJECTION, POWDER, FOR SOLUTION INTRAMUSCULAR; INTRAVENOUS at 09:05

## 2021-01-22 RX ADMIN — Medication 15 G: at 09:40

## 2021-01-22 RX ADMIN — DIPHENHYDRAMINE HCL 12.5 MG: 25 TABLET, COATED ORAL at 09:00

## 2021-01-22 NOTE — PROGRESS NOTES
Patient to Andi for IVIG: Offers no complaints at present time: Lab work ( 01/04/21 ) reviewed:  Within parameters to treat: Left FA PIV initiated without incident

## 2021-02-09 ENCOUNTER — TRANSCRIBE ORDERS (OUTPATIENT)
Dept: ADMINISTRATIVE | Facility: HOSPITAL | Age: 81
End: 2021-02-09

## 2021-02-09 DIAGNOSIS — J45.40 MODERATE PERSISTENT ASTHMA, UNCOMPLICATED: Primary | ICD-10-CM

## 2021-02-10 ENCOUNTER — HOSPITAL ENCOUNTER (OUTPATIENT)
Dept: PULMONOLOGY | Facility: HOSPITAL | Age: 81
Discharge: HOME | End: 2021-02-10
Payer: MEDICARE

## 2021-02-10 DIAGNOSIS — J45.40 MODERATE PERSISTENT ASTHMA, UNCOMPLICATED: ICD-10-CM

## 2021-02-10 PROCEDURE — 94726 PLETHYSMOGRAPHY LUNG VOLUMES: CPT

## 2021-02-10 PROCEDURE — 94729 DIFFUSING CAPACITY: CPT

## 2021-02-10 PROCEDURE — 94729 DIFFUSING CAPACITY: CPT | Performed by: INTERNAL MEDICINE

## 2021-02-10 PROCEDURE — 94726 PLETHYSMOGRAPHY LUNG VOLUMES: CPT | Performed by: INTERNAL MEDICINE

## 2021-02-10 PROCEDURE — 94760 N-INVAS EAR/PLS OXIMETRY 1: CPT

## 2021-02-10 PROCEDURE — 94060 EVALUATION OF WHEEZING: CPT | Performed by: INTERNAL MEDICINE

## 2021-02-10 PROCEDURE — 94060 EVALUATION OF WHEEZING: CPT

## 2021-02-15 ENCOUNTER — OFFICE VISIT (OUTPATIENT)
Dept: PULMONOLOGY | Facility: MEDICAL CENTER | Age: 81
End: 2021-02-15
Payer: MEDICARE

## 2021-02-15 VITALS
BODY MASS INDEX: 24.34 KG/M2 | HEART RATE: 73 BPM | DIASTOLIC BLOOD PRESSURE: 76 MMHG | OXYGEN SATURATION: 98 % | TEMPERATURE: 97 F | SYSTOLIC BLOOD PRESSURE: 134 MMHG | HEIGHT: 70 IN | WEIGHT: 170 LBS | RESPIRATION RATE: 12 BRPM

## 2021-02-15 DIAGNOSIS — J45.30 MILD PERSISTENT ASTHMA WITHOUT COMPLICATION: Primary | ICD-10-CM

## 2021-02-15 DIAGNOSIS — G47.33 OBSTRUCTIVE SLEEP APNEA: Chronic | ICD-10-CM

## 2021-02-15 DIAGNOSIS — C91.10 CLL (CHRONIC LYMPHOCYTIC LEUKEMIA) (HCC): ICD-10-CM

## 2021-02-15 PROCEDURE — 99214 OFFICE O/P EST MOD 30 MIN: CPT | Performed by: INTERNAL MEDICINE

## 2021-02-15 RX ORDER — FLUTICASONE FUROATE 100 UG/1
1 POWDER RESPIRATORY (INHALATION) DAILY
Qty: 3 INHALER | Refills: 3 | Status: SHIPPED | OUTPATIENT
Start: 2021-02-15 | End: 2022-06-07 | Stop reason: SDUPTHER

## 2021-02-15 NOTE — ASSESSMENT & PLAN NOTE
Moderate STEFANY  Presently on auto CPAP pressure range of 10-18 cm water fullface mask  I reviewed compliance data from past 1 month  He used it 23/30 days and used it for an average of almost 4 hours per night     His average CPAP pressure was 17 and this resulted in AHI of 7 7  He will try to use CPAP more often and does use [employer] F10 foam full face mask  which he likes  His medical supply company is [employer]    He is not having any excessive daytime somnolence

## 2021-02-15 NOTE — PROGRESS NOTES
Answers for HPI/ROS submitted by the patient on 2/9/2021   Primary symptoms  Chronicity: new  When did you first notice your symptoms?: more than 1 month ago  How often do your symptoms occur?: 2 to 4 times per day  Since you first noticed this problem, how has it changed?: gradually improving  Do you have shortness of breath that occurs with effort or exertion?: Yes  Do you have ear congestion?: No  Do you have heartburn?: No  Do you have fatigue?: No  Do you have shortness of breath when you wake up?: No  Which of the following makes your symptoms worse?: climbing stairs  Which of the following makes your symptoms better?: rest  Answers for HPI/ROS submitted by the patient on 2/9/2021   Primary symptoms  Chronicity: new  When did you first notice your symptoms?: more than 1 month ago  How often do your symptoms occur?: 2 to 4 times per day  Since you first noticed this problem, how has it changed?: gradually improving  Do you have shortness of breath that occurs with effort or exertion?: Yes  Do you have ear congestion?: No  Do you have heartburn?: No  Do you have fatigue?: No  Do you have shortness of breath when you wake up?: No  Which of the following makes your symptoms worse?: climbing stairs  Which of the following makes your symptoms better?: rest      Assessment/Plan        Problem List Items Addressed This Visit        Respiratory    Obstructive sleep apnea (Chronic)      Moderate STEFANY  Presently on auto CPAP pressure range of 10-18 cm water fullface mask  I reviewed compliance data from past 1 month  He used it 23/30 days and used it for an average of almost 4 hours per night     His average CPAP pressure was 17 and this resulted in AHI of 7 7  He will try to use CPAP more often and does use Airfit F10 foam full face mask  which he likes  His medical supply company is [employer]    He is not having any excessive daytime somnolence         Mild persistent asthma without complication - Primary He is doing well on Arnuity 100 mcg 1 puff daily  I did not want at any long-acting albuterol as he has history of atrial arrhythmia  Also did not 1 at anticholinergic as this caused some problems with urinary retention in the past   For now stick with Arnuity 100 mcg 1 puff daily and albuterol inhaler as needed     he did have complete PFT done February 10 of this year  This showed mild airflow obstruction with moderate decrease in diffusion capacity  He may have component of underlying COPD in addition to his asthma  FEV1  Was 2 48 L or 84% of predicted with obstructive index of 60%  There is mild air trapping with residual volume of 130% and total lung capacity was 102% of predicted  Diffusion capacity was moderately reduced at 59% of predicted         Relevant Medications    Arnuity Ellipta 100 MCG/ACT AEPB inhaler       Other    CLL (chronic lymphocytic leukemia) ([address] )     Leonor Whitney was previously on Imbruvica but because of problems with atrial fibrillation has been switched to Calquence (acalabrutinib)                 CC: some shortness of breath going up a flight of stairs      SEVERIANO Lowe did have complete PFT done February 10th of this year  This showed evidence of some mild airflow obstruction with moderate decrease in diffusion capacity  No significant improvement after bronchodilator  He quit smoking 30 years ago and smoked 1 pack per day for maybe 15 years  He does exercise on a regular basis and does have some mild shortness of breath initially when he starts exercise but this then improves  As he continues to work out  Has not had any recent respiratory tract infections  He does have history of CLL, hypertension, and IgG deficiency  Does receive monthly gammaglobulin infusion and last infusion was given on January 22nd  Also has history of obstructive sleep apnea      He used to be on ibrutinib ( Imbruvica) but was switched to new medication Calquence because he was started have some problems with atrial fibrillation  He is not have any palpitations at this time  He does see cardiologist Dr Trent Manuel and I did review his note  From December 29  He did have echocardiogram done recently which showed normal LV systolic function grade 1 diastolic dysfunction  RSVP was normal    [place]  Has moderate STEFANY and is on auto CPAP at pressure range of 10-18 cm water use a full face mask  Medical supply company is [employer]  He does live part-time and in [place], [place]  He uses a full foam face mass likely air fit F 20 mask  He does like this mask  He states his new or CPAP machine does make some noise and he is waiting for [employer] to check the machine now  He presently is using his older CPAP machine  Not having any excessive daytime somnolence  Past Medical History:   Diagnosis Date    Arthritis     BPH (benign prostatic hyperplasia)     Cancer (HCC)     Chronic lymphocytic leukemia (CLL), B-cell (HCC)     CPAP (continuous positive airway pressure) dependence     Depression     Hearing aid worn     bilateral    Hyperlipidemia     controlled    Hypertension     controlled    Sinusitis     Sleep apnea     CPAP    Tinnitus     Wears glasses        Past Surgical History:   Procedure Laterality Date    BACK SURGERY  09/2017    laminotomy L3,4 S1-    CARPAL TUNNEL RELEASE Bilateral     CATARACT EXTRACTION      left    CATARACT EXTRACTION W/ INTRAOCULAR LENS IMPLANT Left 12/11/2017    Procedure: EXTRACTION EXTRACAPSULAR CATARACT PHACO INTRAOCULAR LENS (IOL);   Surgeon: Jenaro Olivares MD;  [facility] MA[facility];  Service: Ophthalmology    CERVICAL DISCECTOMY  1982    C5-6    COLONOSCOPY      HERNIA REPAIR      l inguinal    IR LOWER EXTREMITY / INTERVENTION  12/2/2019    ROTATOR CUFF REPAIR Left 2006    TONSILLECTOMY           Current Outpatient Medications:     amLODIPine (NORVASC) 10 mg tablet, Take 10 mg by mouth every morning, Disp: , Rfl:     Arnuity Ellipta 100 MCG/ACT AEPB inhaler, Inhale 1 puff daily Rinse mouth after use , Disp: 3 Inhaler, Rfl: 3    buPROPion (WELLBUTRIN XL) 300 mg 24 hr tablet, Take 300 mg by mouth every morning, Disp: , Rfl:     Cholecalciferol (Vitamin D3) 50 MCG ( UT) capsule, Take 2,000 Units by mouth daily, Disp: , Rfl:     lisinopril (ZESTRIL) 20 mg tablet, Take 20 mg by mouth 2 (two) times a day, Disp: , Rfl:     metoprolol succinate (TOPROL-XL) 25 mg 24 hr tablet, Take 25 mg by mouth every morning , Disp: , Rfl:     Multiple Vitamins-Minerals (PRESERVISION AREDS 2 PO), Take by mouth 2 (two) times a day, Disp: , Rfl:     tadalafil (CIALIS) 5 MG tablet, , Disp: , Rfl:     Ibrutinib 140 MG CAPS, Take by mouth every morning, Disp: , Rfl:     Immune Globulin, Human, 30 GM/300ML SOLN, Inject as directed Every 6 weeks-IV, Disp: , Rfl:     rosuvastatin (CRESTOR) 10 MG tablet, Take 10 mg by mouth every morning, Disp: , Rfl:     Allergies   Allergen Reactions    Rocephin [Ceftriaxone]      Avoids d/t previous liver toxicity       Social History     Tobacco Use    Smoking status: Former Smoker     Packs/day: 1 00     Years: 20 00     Pack years: 20 00     Quit date: 5     Years since quittin 1    Smokeless tobacco: Never Used   Substance Use Topics    Alcohol use: Yes     Alcohol/week: 7 0 standard drinks     Types: 7 Glasses of wine per week         Family History   Problem Relation Age of Onset    Cancer Mother         bladder    Heart disease Father     Stroke Father     Parkinsonism Brother        Review of Systems   Constitutional: Negative for appetite change and fever  HENT: Negative for ear pain  Eyes: Negative for redness  Respiratory: Positive for shortness of breath  Cardiovascular: Negative for chest pain  Gastrointestinal: Negative for abdominal pain  Endocrine: Negative for polydipsia and polyphagia  Genitourinary: Negative for hematuria  Musculoskeletal: Negative for myalgias     Neurological: Negative for headaches  Psychiatric/Behavioral: Negative for confusion  Vitals:    02/15/21 0804   BP: 134/76   Pulse: 73   Resp: 12   Temp: (!) 97 °F (36 1 °C)   SpO2: 98%           Physical Exam  Constitutional:       General: He is not in acute distress  Appearance: He is well-developed  HENT:      Head: Normocephalic  Nose: Nose normal       Mouth/Throat:      Mouth: Mucous membranes are moist       Pharynx: Oropharynx is clear  No oropharyngeal exudate  Eyes:      Conjunctiva/sclera: Conjunctivae normal       Pupils: Pupils are equal, round, and reactive to light  Neck:      Musculoskeletal: Neck supple  No neck rigidity  Cardiovascular:      Rate and Rhythm: Normal rate and regular rhythm  Heart sounds: Normal heart sounds  Pulmonary:      Effort: Pulmonary effort is normal       Comments: Lung sounds are clear  No wheezes crackles or rhonchi  Abdominal:      General: There is no distension  Palpations: Abdomen is soft  Tenderness: There is no abdominal tenderness  Musculoskeletal:      Comments: No edema, cyanosis clubbing   Skin:     General: Skin is warm and dry  Neurological:      Mental Status: He is alert and oriented to person, place, and time  Psychiatric:         Mood and Affect: Mood normal          Behavior: Behavior normal          Thought Content:  Thought content normal

## 2021-02-16 ENCOUNTER — TELEPHONE (OUTPATIENT)
Dept: HEMATOLOGY ONCOLOGY | Facility: CLINIC | Age: 81
End: 2021-02-16

## 2021-02-16 NOTE — ASSESSMENT & PLAN NOTE
He is doing well on Arnuity 100 mcg 1 puff daily  I did not want at any long-acting albuterol as he has history of atrial arrhythmia  Also did not 1 at anticholinergic as this caused some problems with urinary retention in the past   For now stick with Arnuity 100 mcg 1 puff daily and albuterol inhaler as needed     he did have complete PFT done February 10 of this year  This showed mild airflow obstruction with moderate decrease in diffusion capacity  He may have component of underlying COPD in addition to his asthma  FEV1  Was 2 48 L or 84% of predicted with obstructive index of 60%  There is mild air trapping with residual volume of 130% and total lung capacity was 102% of predicted    Diffusion capacity was moderately reduced at 59% of predicted

## 2021-02-16 NOTE — TELEPHONE ENCOUNTER
Patient advised to have his labs completed prior to his upcoming follow up appointment with Luis Tovar on 2/19/2021  Patient stated that he will report to a [facility] lab tomorrow 2/17/2021

## 2021-02-16 NOTE — ASSESSMENT & PLAN NOTE
Louie Spencer was previously on [address] [place] [address] but because of problems with atrial fibrillation has been switched to Calquence (acalabrutinib)

## 2021-02-17 ENCOUNTER — LAB (OUTPATIENT)
Dept: LAB | Facility: CLINIC | Age: 81
End: 2021-02-17
Payer: MEDICARE

## 2021-02-17 ENCOUNTER — TELEPHONE (OUTPATIENT)
Dept: HEMATOLOGY ONCOLOGY | Facility: CLINIC | Age: 81
End: 2021-02-17

## 2021-02-17 DIAGNOSIS — D80.3 IGG DEFICIENCY (HCC): ICD-10-CM

## 2021-02-19 ENCOUNTER — OFFICE VISIT (OUTPATIENT)
Dept: HEMATOLOGY ONCOLOGY | Facility: CLINIC | Age: 81
End: 2021-02-19
Payer: MEDICARE

## 2021-02-19 VITALS
WEIGHT: 173.4 LBS | HEIGHT: 70 IN | TEMPERATURE: 98.3 F | BODY MASS INDEX: 24.82 KG/M2 | HEART RATE: 66 BPM | DIASTOLIC BLOOD PRESSURE: 70 MMHG | SYSTOLIC BLOOD PRESSURE: 120 MMHG | OXYGEN SATURATION: 98 % | RESPIRATION RATE: 12 BRPM

## 2021-02-19 DIAGNOSIS — Z86.79 HISTORY OF ATRIAL FIBRILLATION: ICD-10-CM

## 2021-02-19 DIAGNOSIS — C91.10 CLL (CHRONIC LYMPHOCYTIC LEUKEMIA) (HCC): Primary | ICD-10-CM

## 2021-02-19 DIAGNOSIS — E78.5 HYPERLIPIDEMIA, UNSPECIFIED HYPERLIPIDEMIA TYPE: ICD-10-CM

## 2021-02-19 DIAGNOSIS — G47.33 OBSTRUCTIVE SLEEP APNEA: Chronic | ICD-10-CM

## 2021-02-19 DIAGNOSIS — I10 HYPERTENSION, UNSPECIFIED TYPE: ICD-10-CM

## 2021-02-19 DIAGNOSIS — D80.3 IGG DEFICIENCY (HCC): ICD-10-CM

## 2021-02-19 DIAGNOSIS — J45.30 MILD PERSISTENT ASTHMA WITHOUT COMPLICATION: ICD-10-CM

## 2021-02-19 PROCEDURE — 99214 OFFICE O/P EST MOD 30 MIN: CPT | Performed by: INTERNAL MEDICINE

## 2021-02-19 NOTE — PATIENT INSTRUCTIONS
HAS STANDING ORDERS FOR BLOOD TESTS  IVIG THERAPY EVERY 2 MONTHS  FOR FOLLOW-UP HE WILL CALL OUR OFFICE AFTER HE IS BACK FROM [place]

## 2021-02-19 NOTE — PROGRESS NOTES
HPI: Follow-up visit for CLL and low IgG level and patient was recently switched from ibrutinib to  Acalabrutinib because of atrial fibrillation  Presently he does not have AFib  No bleeding  He is not on blood thinner  He has been receiving IVIG therapy to keep his IgG level above 400  He goes to see Dr Juan Magana at [facility]  He was having exertional dyspnea and he had cardiac and pulmonary evaluation and has been told about mild asthma and he has medication for that and that has helped  Several years ago he was diagnosed has to have CLL   After initial period of observation he had 5 cycles of FCR in 2010 followed by trial of autologous T-cell infusion in December 2010  He did well until 2014 when disease progressed and he was started on ibrutinib  Patient developed AFib and pneumonia on ibrutinib and after stopping ibrutinib for a while he was maintained on 140 mg ibrutinib daily  But now he is on acalabrutinib  100 mg twice a day  ROS:  02/19/21 Reviewed 13 systems: See symptoms in HPI  Presently no  Other neurological, cardiac, pulmonary, GI and  symptoms other than listed in HPI  No other symptoms like  fever, chills, active bleeding, bone pains, skin rash, weight loss, arthritic symptoms,  tiredness , weakness, numbness, claudication and gait problem  No more frequent infections  Not unusually sensitive to heat or cold  No swelling of the ankles  No swollen glands  Patient is anxious  Liu Sidhu       Historical Information   Past Medical History:   Diagnosis Date    Arthritis     BPH (benign prostatic hyperplasia)     Cancer (HCC)     Chronic lymphocytic leukemia (CLL), B-cell (HCC)     CPAP (continuous positive airway pressure) dependence     Depression     Hearing aid worn     bilateral    Hyperlipidemia     controlled    Hypertension     controlled    Sinusitis     Sleep apnea     CPAP    Tinnitus     Wears glasses      Past Surgical History:   Procedure Laterality Date    BACK SURGERY  2017    laminotomy L3,4 S1-    CARPAL TUNNEL RELEASE Bilateral     CATARACT EXTRACTION      left    CATARACT EXTRACTION W/ INTRAOCULAR LENS IMPLANT Left 2017    Procedure: EXTRACTION EXTRACAPSULAR CATARACT PHACO INTRAOCULAR LENS (IOL);   Surgeon: Humberto Joy MD;  [facility] MA[facility];  Service: Ophthalmology    CERVICAL DISCECTOMY      C5-6    COLONOSCOPY      HERNIA REPAIR      l inguinal    IR LOWER EXTREMITY / INTERVENTION  2019    ROTATOR CUFF REPAIR Left 2006    TONSILLECTOMY       Social History   Social History     Substance and Sexual Activity   Alcohol Use Yes    Alcohol/week: 7 0 standard drinks    Types: 7 Glasses of wine per week     Social History     Substance and Sexual Activity   Drug Use No     Social History     Tobacco Use   Smoking Status Former Smoker    Packs/day: 1 00    Years: 20 00    Pack years: 20     Quit date: 5    Years since quittin 1   Smokeless Tobacco Never Used     Family History:   Family History   Problem Relation Age of Onset   Mehnaz Barker Cancer Mother         bladder    Heart disease Father     Stroke Father     Parkinsonism Brother          Current Outpatient Medications:     Acalabrutinib (Calquence) 100 MG CAPS, Take 1 capsule by mouth 2 (two) times a day, Disp: , Rfl:     amLODIPine (NORVASC) 10 mg tablet, Take 10 mg by mouth every morning, Disp: , Rfl:     Arnuity Ellipta 100 MCG/ACT AEPB inhaler, Inhale 1 puff daily Rinse mouth after use , Disp: 3 Inhaler, Rfl: 3    Cholecalciferol (Vitamin D3) 50 MCG ( UT) capsule, Take 2,000 Units by mouth daily, Disp: , Rfl:     lisinopril (ZESTRIL) 20 mg tablet, Take 20 mg by mouth 2 (two) times a day, Disp: , Rfl:     metoprolol succinate (TOPROL-XL) 25 mg 24 hr tablet, Take 25 mg by mouth every morning , Disp: , Rfl:     Multiple Vitamins-Minerals (PRESERVISION AREDS 2 PO), Take by mouth 2 (two) times a day, Disp: , Rfl:     rosuvastatin (CRESTOR) 10 MG tablet, Take 10 mg by mouth 2 (two) times a day , Disp: , Rfl:     tadalafil (CIALIS) 5 MG tablet, , Disp: , Rfl:     buPROPion (WELLBUTRIN XL) 300 mg 24 hr tablet, Take 300 mg by mouth every morning, Disp: , Rfl:     Ibrutinib 140 MG CAPS, Take by mouth every morning, Disp: , Rfl:     Immune Globulin, Human, 30 GM/300ML SOLN, Inject as directed Every 6 weeks-IV, Disp: , Rfl:     Allergies   Allergen Reactions    Rocephin [Ceftriaxone]      Avoids d/t previous liver toxicity     @ ROS@  Physical Exam:  Vitals:    02/19/21 1349   BP: 120/70   BP Location: Left arm   Patient Position: Sitting   Cuff Size: Adult   Pulse: 66   Resp: 12   Temp: 98 3 °F (36 8 °C)   TempSrc: Temporal   SpO2: 98%   Weight: 78 7 kg (173 lb 6 4 oz)   Height: 5' 10" (1 778 m)     Physical examination:  Alert, oriented, not in distress, stable vital signs, no icterus, no oral thrush, no palpable neck mass, lung fields are clear to percussion auscultation, heart rate is regular,  abdomen  soft and non tender, no palpable abdominal mass, no ascites, no edema of ankles, no calf tenderness, no focal neurological deficit, no skin rash, no palpable lymphadenopathy,  no clubbing  Patient is anxious  Performance status 1  Lab Results: I have reviewed all pertinent labs    LABS:    Results for orders placed or performed in visit on 01/29/21   CBC and differential   Result Value Ref Range    WBC 8 39 4 31 - 10 16 Thousand/uL    RBC 4 07 3 88 - 5 62 Million/uL    Hemoglobin 12 0 12 0 - 17 0 g/dL    Hematocrit 37 7 36 5 - 49 3 %    MCV 93 82 - 98 fL    MCH 29 5 26 8 - 34 3 pg    MCHC 31 8 31 4 - 37 4 g/dL    RDW 14 1 11 6 - 15 1 %    MPV 10 8 8 9 - 12 7 fL    Platelets 898 917 - 960 Thousands/uL    nRBC 0 /100 WBCs    Neutrophils Relative 69 43 - 75 %    Immat GRANS % 1 0 - 2 %    Lymphocytes Relative 19 14 - 44 %    Monocytes Relative 10 4 - 12 %    Eosinophils Relative 1 0 - 6 %    Basophils Relative 0 0 - 1 %    Neutrophils Absolute 5 82 1 85 - 7 62 Thousands/µL    Immature Grans Absolute 0 04 0 00 - 0 20 Thousand/uL    Lymphocytes Absolute 1 58 0 60 - 4 47 Thousands/µL    Monocytes Absolute 0 82 0 17 - 1 22 Thousand/µL    Eosinophils Absolute 0 10 0 00 - 0 61 Thousand/µL    Basophils Absolute 0 03 0 00 - 0 10 Thousands/µL   Comprehensive metabolic panel   Result Value Ref Range    Sodium 135 (L) 136 - 145 mmol/L    Potassium 4 5 3 5 - 5 3 mmol/L    Chloride 100 100 - 108 mmol/L    CO2 30 21 - 32 mmol/L    ANION GAP 5 4 - 13 mmol/L    BUN 22 5 - 25 mg/dL    Creatinine 1 04 0 60 - 1 30 mg/dL    Glucose 116 65 - 140 mg/dL    Calcium 9 3 8 3 - 10 1 mg/dL    AST 31 5 - 45 U/L    ALT 45 12 - 78 U/L    Alkaline Phosphatase 65 46 - 116 U/L    Total Protein 6 6 6 4 - 8 2 g/dL    Albumin 4 0 3 5 - 5 0 g/dL    Total Bilirubin 0 78 0 20 - 1 00 mg/dL    eGFR 68 ml/min/1 73sq m   Uric acid   Result Value Ref Range    Uric Acid 5 5 4 2 - 8 0 mg/dL   IgG, IgA, IgM   Result Value Ref Range    IGA 21 0 (L) 70 0 - 400 0 mg/dL     0 (L) 700 0-1,600 0 mg/dL    IGM 24 0 (L) 40 0 - 230 0 mg/dL   LD,Blood   Result Value Ref Range     81 - 234 U/L         Imaging Studies: I have personally reviewed pertinent reports  Pathology, and Other Studies: I have personally reviewed pertinent reports     Reviewed CBC, CMP, LDH, uric acid and IgA, IgG and IgM levels and discussed with patient and explained    Assessment and Plan:   Follow-up visit for CLL and low IgG level and patient was recently switched from ibrutinib to  Acalabrutinib because of atrial fibrillation  Presently he does not have AFib  No bleeding  He is not on blood thinner  He has been receiving IVIG therapy to keep his IgG level above 400  He goes to see Dr Riley Lowry at [facility]  He was having exertional dyspnea and he had cardiac and pulmonary evaluation and has been told about mild asthma and he has medication for that and that has helped  Several years ago he was diagnosed has to have CLL     After initial period of observation he had 5 cycles of FCR in 2010 followed by trial of autologous T-cell infusion in December 2010  He did well until 2014 when disease progressed and he was started on ibrutinib  Patient developed AFib and pneumonia on ibrutinib and after stopping ibrutinib for a while he was maintained on 140 mg ibrutinib daily  But now he is on acalabrutinib  100 mg twice a day  Edmar Luis Physical examination and test results are as recorded and discussed in detail  No change in therapy,  Acalabrutinib and IVIG therapy      All discussed in detail  Questions answered  Discussed the importance of eating healthy foods and staying active as tolerated   Goal will be prolonged progression-free survival and safety of treatment  Patient is capable of self-care  Discussed precautions against coronavirus  1  CLL (chronic lymphocytic leukemia) (HCC)      2  IgG deficiency ([address] )      3  Hyperlipidemia, unspecified hyperlipidemia type      4  Hypertension, unspecified type      5  Obstructive sleep apnea      6  Mild persistent asthma without complication      7  History of atrial fibrillation         HAS STANDING ORDERS FOR BLOOD TESTS  IVIG THERAPY EVERY 2 MONTHS  FOR FOLLOW-UP HE WILL CALL OUR OFFICE AFTER HE IS BACK FROM [place]  Patient voiced understanding and agreement in the discussion  Counseling / Coordination of Care    Edmar Luis   Provided counseling and support

## 2021-02-20 PROBLEM — Z86.79 HISTORY OF ATRIAL FIBRILLATION: Status: ACTIVE | Noted: 2021-02-20

## 2021-05-03 ENCOUNTER — OFFICE VISIT (OUTPATIENT)
Dept: PULMONOLOGY | Facility: MEDICAL CENTER | Age: 81
End: 2021-05-03
Payer: MEDICARE

## 2021-05-03 VITALS
HEIGHT: 70 IN | OXYGEN SATURATION: 96 % | TEMPERATURE: 98.1 F | BODY MASS INDEX: 24.77 KG/M2 | SYSTOLIC BLOOD PRESSURE: 120 MMHG | RESPIRATION RATE: 12 BRPM | WEIGHT: 173 LBS | HEART RATE: 77 BPM | DIASTOLIC BLOOD PRESSURE: 60 MMHG

## 2021-05-03 DIAGNOSIS — C91.10 CLL (CHRONIC LYMPHOCYTIC LEUKEMIA) (HCC): ICD-10-CM

## 2021-05-03 DIAGNOSIS — G47.33 OBSTRUCTIVE SLEEP APNEA: Primary | Chronic | ICD-10-CM

## 2021-05-03 DIAGNOSIS — J45.30 MILD PERSISTENT ASTHMA WITHOUT COMPLICATION: ICD-10-CM

## 2021-05-03 PROCEDURE — 99214 OFFICE O/P EST MOD 30 MIN: CPT | Performed by: INTERNAL MEDICINE

## 2021-05-03 NOTE — PROGRESS NOTES
Assessment/Plan        Problem List Items Addressed This Visit        Respiratory    Obstructive sleep apnea - Primary (Chronic)      Moderate persistent asthma  He has been using his CPAP  He has 2 CPAP machines  When he is in [place] he will use his new CPAP machine and when he lives part-time out in [place] he will uses older CPAP machine  He just got back to [place] about 2-3 weeks going I did review compliance data from that time  He average  Almost 5 hours of usage per night  He is on auto CPAP mode set at pressure of 10-16 cm water  Average AHI was 14 9  Average CPAP pressure was 15 1  Has AHI is slightly elevated I will increase pressure range to 12 to 16 cm H2O     He does use AirFit F10 foam fullface mass which she likes  Medical supply company is [employer]         Mild persistent asthma without complication      Doing well on his regimen of Arnuity 100 mcg 1 puff daily  Only needs to use his albuterol inhaler periodically            Other    CLL (chronic lymphocytic leukemia) ([address] )      He is on Calquence which he is tolerating period he does have some loose stool related to this  He was switch from  [place] as he was having problems with paroxysmal atrial fibrillation  Has not had any problem with atrial fibrillation since the switch  He is also on gammaglobulin infusion  He gets dose every other month  for hypogammaglobinemia                 Follow-up for STEFANY and asthma      HPI      Mack Farias does have history of CLL and is on Acalabrutinib  He has been switched from ibrutinib because of recurrent atrial fibrillation has not had problems with atrial fibrillation since the switch  He does have also low IgG level and does receive IV IG therapy 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  He does follow with hematologist Dr Patino [facility]  [place]    Does have some loose stool related to his acalabrutinib ( Calquence)    He does have moderate STEFANY and is on auto CPAP with pressure range of 10 18 cm water  He does use an air fit F10 foam full full face mask and medical supply company is [employer]  He also is on Arnuity 100 mcg 1 puff daily for   Mild persistent asthma  Not have any problem with his asthma now  Not needing to use his albuterol inhaler that often  Does get some mild shortness of breath sometimes when he walks up his basement stairs very quickly  Sometimes to also states when he sits up of slight shortness of breath they will resolve after about a minute or so  No dizziness  He is on low-dose metoprolol 25 mg daily and was wearing this could be causing his symptoms  He does follow cardiologist Dr Nj Ferrer      Past Medical History:   Diagnosis Date    Arthritis     BPH (benign prostatic hyperplasia)     Cancer [facility]     Chronic lymphocytic leukemia (CLL), B-cell ([facility] )     CPAP (continuous positive airway pressure) dependence     Depression     Hearing aid worn     bilateral    Hyperlipidemia     controlled    Hypertension     controlled    Sinusitis     Sleep apnea     CPAP    Tinnitus     Wears glasses        Past Surgical History:   Procedure Laterality Date    BACK SURGERY  09/2017    laminotomy L3,4 S1-    CARPAL TUNNEL RELEASE Bilateral     CATARACT EXTRACTION      left    CATARACT EXTRACTION W/ INTRAOCULAR LENS IMPLANT Left 12/11/2017    Procedure: EXTRACTION EXTRACAPSULAR CATARACT PHACO INTRAOCULAR LENS (IOL);   Surgeon: Roddy Hernandez MD;  Location: [facility] MAIN OR;  Service: Ophthalmology    CERVICAL DISCECTOMY  1982    C5-6    COLONOSCOPY      HERNIA REPAIR      l inguinal    IR LOWER EXTREMITY / INTERVENTION  12/2/2019    ROTATOR CUFF REPAIR Left 2006    TONSILLECTOMY           Current Outpatient Medications:     Acalabrutinib (Calquence) 100 MG CAPS, Take 1 capsule by mouth 2 (two) times a day, Disp: , Rfl:     amLODIPine (NORVASC) 10 mg tablet, Take 10 mg by mouth every morning, Disp: , Rfl:     Arnuity Ellipta 100 MCG/ACT AEPB inhaler, Inhale 1 puff daily Rinse mouth after use , Disp: 3 Inhaler, Rfl: 3    buPROPion (WELLBUTRIN XL) 300 mg 24 hr tablet, Take 300 mg by mouth every morning, Disp: , Rfl:     Cholecalciferol (Vitamin D3) 50 MCG ( UT) capsule, Take 2,000 Units by mouth daily, Disp: , Rfl:     Ibrutinib 140 MG CAPS, Take by mouth every morning, Disp: , Rfl:     Immune Globulin, Human, 30 GM/300ML SOLN, Inject as directed Every 6 weeks-IV, Disp: , Rfl:     lisinopril (ZESTRIL) 20 mg tablet, Take 20 mg by mouth 2 (two) times a day, Disp: , Rfl:     metoprolol succinate (TOPROL-XL) 25 mg 24 hr tablet, Take 25 mg by mouth every morning , Disp: , Rfl:     Multiple Vitamins-Minerals (PRESERVISION AREDS 2 PO), Take by mouth 2 (two) times a day, Disp: , Rfl:     rosuvastatin (CRESTOR) 10 MG tablet, Take 10 mg by mouth 2 (two) times a day , Disp: , Rfl:     tadalafil (CIALIS) 5 MG tablet, , Disp: , Rfl:     Allergies   Allergen Reactions    Rocephin [Ceftriaxone]      Avoids d/t previous liver toxicity       Social History     Tobacco Use    Smoking status: Former Smoker     Packs/day: 1 00     Years: 20 00     Pack years: 20 00     Quit date: 5     Years since quittin 3    Smokeless tobacco: Never Used   Substance Use Topics    Alcohol use: Yes     Alcohol/week: 7 0 standard drinks     Types: 7 Glasses of wine per week         Family History   Problem Relation Age of Onset    Cancer Mother         bladder    Heart disease Father     Stroke Father     Parkinsonism Brother        Review of Systems   Constitutional: Negative for chills, fever and unexpected weight change  Does not have excessive daytime somnolence  Does not feel like he will fall sleep driving   HENT: Negative for congestion, rhinorrhea and sore throat  Eyes: Negative for discharge and redness  Respiratory: Negative for cough  Only shortness of breath when he goes up his basement stairs quickly    Sometimes has some shortness of breath when he first sits up from standing position  Cardiovascular: Negative for chest pain, palpitations and leg swelling  Gastrointestinal: Negative for abdominal distention, abdominal pain and nausea  Endocrine: Negative for polydipsia and polyphagia  Genitourinary: Negative for dysuria  Musculoskeletal: Negative for joint swelling and myalgias  Skin: Negative for rash  Neurological: Negative for light-headedness  Psychiatric/Behavioral: Negative for confusion  Vitals:    05/03/21 0817   BP: 120/60   Pulse: 77   Resp: 12   Temp: 98 1 °F (36 7 °C)   SpO2: 96%           Physical Exam  Vitals signs reviewed  Constitutional:       General: He is not in acute distress  Appearance: Normal appearance  He is well-developed  HENT:      Head: Normocephalic  Nose: Nose normal       Mouth/Throat:      Mouth: Mucous membranes are moist       Pharynx: Oropharynx is clear  No oropharyngeal exudate  Eyes:      Conjunctiva/sclera: Conjunctivae normal       Pupils: Pupils are equal, round, and reactive to light  Neck:      Musculoskeletal: Neck supple  Cardiovascular:      Rate and Rhythm: Normal rate and regular rhythm  Heart sounds: Normal heart sounds  Pulmonary:      Effort: Pulmonary effort is normal       Comments: Lung sounds are clear  No wheezes crackles or rhonchi  Abdominal:      General: There is no distension  Palpations: Abdomen is soft  Tenderness: There is no abdominal tenderness  Musculoskeletal:      Comments: No edema, cyanosis or clubbing   Lymphadenopathy:      Cervical: No cervical adenopathy  Skin:     General: Skin is warm and dry  Neurological:      Mental Status: He is alert and oriented to person, place, and time  Psychiatric:         Mood and Affect: Mood normal          Behavior: Behavior normal          Thought Content:  Thought content normal

## 2021-05-03 NOTE — ASSESSMENT & PLAN NOTE
He is on Calquence which he is tolerating period he does have some loose stool related to this  He was switch from  [place] as he was having problems with paroxysmal atrial fibrillation  Has not had any problem with atrial fibrillation since the switch  He is also on gammaglobulin infusion    He gets dose every other month  for hypogammaglobinemia

## 2021-05-03 NOTE — ASSESSMENT & PLAN NOTE
Moderate STEFANY  He has been using his CPAP  He has 2 CPAP machines  When he is in [place] he will use his new CPAP machine and when he lives part-time out in [place] he will uses older CPAP machine  He just got back to [place] about 2-3 weeks going I did review compliance data from that time  He average  Almost 5 hours of usage per night  He is on auto CPAP mode set at pressure of 10-16 cm water  Average AHI was 14 9  Average CPAP pressure was 15 1  Has AHI is slightly elevated I will increase pressure range to 12 to 16 cm H2O     He does use AirFit F10 foam fullface mass which she likes    Medical supply company is [facility]

## 2021-05-03 NOTE — ASSESSMENT & PLAN NOTE
Doing well on his regimen of Arnuity 100 mcg 1 puff daily    Only needs to use his albuterol inhaler periodically

## 2021-06-17 DIAGNOSIS — D80.3 IGG DEFICIENCY (HCC): Primary | ICD-10-CM

## 2021-06-17 RX ORDER — SODIUM CHLORIDE 9 MG/ML
20 INJECTION, SOLUTION INTRAVENOUS ONCE
Status: CANCELLED | OUTPATIENT
Start: 2021-06-21

## 2021-06-17 RX ORDER — ACETAMINOPHEN 325 MG/1
650 TABLET ORAL ONCE
Status: CANCELLED | OUTPATIENT
Start: 2021-06-21

## 2021-06-17 RX ORDER — DIPHENHYDRAMINE HCL 25 MG
12.5 TABLET ORAL ONCE
Status: CANCELLED | OUTPATIENT
Start: 2021-06-21

## 2021-06-21 ENCOUNTER — HOSPITAL ENCOUNTER (OUTPATIENT)
Dept: INFUSION CENTER | Facility: CLINIC | Age: 81
Discharge: HOME | End: 2021-06-21
Payer: MEDICARE

## 2021-06-21 VITALS
DIASTOLIC BLOOD PRESSURE: 42 MMHG | TEMPERATURE: 97 F | HEART RATE: 73 BPM | SYSTOLIC BLOOD PRESSURE: 100 MMHG | RESPIRATION RATE: 16 BRPM | OXYGEN SATURATION: 97 %

## 2021-06-21 DIAGNOSIS — D80.3 IGG DEFICIENCY (HCC): Primary | ICD-10-CM

## 2021-06-21 PROCEDURE — 96365 THER/PROPH/DIAG IV INF INIT: CPT

## 2021-06-21 PROCEDURE — 96366 THER/PROPH/DIAG IV INF ADDON: CPT

## 2021-06-21 PROCEDURE — 96375 TX/PRO/DX INJ NEW DRUG ADDON: CPT

## 2021-06-21 RX ORDER — SODIUM CHLORIDE 9 MG/ML
20 INJECTION, SOLUTION INTRAVENOUS ONCE
Status: COMPLETED | OUTPATIENT
Start: 2021-06-21 | End: 2021-06-21

## 2021-06-21 RX ORDER — DIPHENHYDRAMINE HCL 25 MG
12.5 TABLET ORAL ONCE
Status: CANCELLED | OUTPATIENT
Start: 2021-08-16

## 2021-06-21 RX ORDER — SODIUM CHLORIDE 9 MG/ML
20 INJECTION, SOLUTION INTRAVENOUS ONCE
Status: CANCELLED | OUTPATIENT
Start: 2021-08-16

## 2021-06-21 RX ORDER — DIPHENHYDRAMINE HCL 25 MG
12.5 TABLET ORAL ONCE
Status: COMPLETED | OUTPATIENT
Start: 2021-06-21 | End: 2021-06-21

## 2021-06-21 RX ORDER — ACETAMINOPHEN 325 MG/1
650 TABLET ORAL ONCE
Status: COMPLETED | OUTPATIENT
Start: 2021-06-21 | End: 2021-06-21

## 2021-06-21 RX ORDER — ACETAMINOPHEN 325 MG/1
650 TABLET ORAL ONCE
Status: CANCELLED | OUTPATIENT
Start: 2021-08-16

## 2021-06-21 RX ADMIN — Medication 15 G: at 09:28

## 2021-06-21 RX ADMIN — HYDROCORTISONE SODIUM SUCCINATE 100 MG: 100 INJECTION, POWDER, FOR SOLUTION INTRAMUSCULAR; INTRAVENOUS at 08:52

## 2021-06-21 RX ADMIN — ACETAMINOPHEN 650 MG: 325 TABLET, FILM COATED ORAL at 08:51

## 2021-06-21 RX ADMIN — DIPHENHYDRAMINE HCL 12.5 MG: 25 TABLET, COATED ORAL at 08:51

## 2021-06-21 RX ADMIN — SODIUM CHLORIDE 20 ML/HR: 0.9 INJECTION, SOLUTION INTRAVENOUS at 08:51

## 2021-06-21 NOTE — NURSING NOTE
Patient arrives to infusion center for IVIG infusion  Patient has had IVIG in past without issue  Patient reports no complaints at this time  PIV obtained without issue, coband securement in place  Patient resting on recliner chair, call bell within reach

## 2021-06-21 NOTE — NURSING NOTE
Patient tolerated treatment well today without issue  Patient offers no complaints  PIV removed intact for discharge, coband dressing in place  Patient states he will make next appointment at  upon DC  AAOx4 and ambulatory upon DC

## 2021-06-28 ENCOUNTER — APPOINTMENT (OUTPATIENT)
Dept: LAB | Facility: CLINIC | Age: 81
End: 2021-06-28
Payer: MEDICARE

## 2021-06-28 DIAGNOSIS — C91.12 CHRONIC LYMPHOCYTIC LEUKEMIA OF B-CELL TYPE IN RELAPSE (HCC): ICD-10-CM

## 2021-06-28 LAB
ALBUMIN SERPL BCP-MCNC: 3.7 G/DL (ref 3.5–5)
ALP SERPL-CCNC: 59 U/L (ref 46–116)
ALT SERPL W P-5'-P-CCNC: 35 U/L (ref 12–78)
ANION GAP SERPL CALCULATED.3IONS-SCNC: 7 MMOL/L (ref 4–13)
AST SERPL W P-5'-P-CCNC: 21 U/L (ref 5–45)
BASOPHILS # BLD AUTO: 0.02 THOUSANDS/ΜL (ref 0–0.1)
BASOPHILS NFR BLD AUTO: 0 % (ref 0–1)
BILIRUB SERPL-MCNC: 0.8 MG/DL (ref 0.2–1)
BUN SERPL-MCNC: 16 MG/DL (ref 5–25)
CALCIUM SERPL-MCNC: 9.3 MG/DL (ref 8.3–10.1)
CHLORIDE SERPL-SCNC: 102 MMOL/L (ref 100–108)
CHOLEST SERPL-MCNC: 117 MG/DL (ref 50–200)
CO2 SERPL-SCNC: 25 MMOL/L (ref 21–32)
CREAT SERPL-MCNC: 0.9 MG/DL (ref 0.6–1.3)
EOSINOPHIL # BLD AUTO: 0.1 THOUSAND/ΜL (ref 0–0.61)
EOSINOPHIL NFR BLD AUTO: 1 % (ref 0–6)
ERYTHROCYTE [DISTWIDTH] IN BLOOD BY AUTOMATED COUNT: 13.9 % (ref 11.6–15.1)
EST. AVERAGE GLUCOSE BLD GHB EST-MCNC: 117 MG/DL
GFR SERPL CREATININE-BSD FRML MDRD: 82 ML/MIN/1.73SQ M
GLUCOSE P FAST SERPL-MCNC: 120 MG/DL (ref 65–99)
HBA1C MFR BLD: 5.7 %
HCT VFR BLD AUTO: 39 % (ref 36.5–49.3)
HDLC SERPL-MCNC: 68 MG/DL
HGB BLD-MCNC: 12.6 G/DL (ref 12–17)
IMM GRANULOCYTES # BLD AUTO: 0.02 THOUSAND/UL (ref 0–0.2)
IMM GRANULOCYTES NFR BLD AUTO: 0 % (ref 0–2)
LDLC SERPL CALC-MCNC: 39 MG/DL (ref 0–100)
LYMPHOCYTES # BLD AUTO: 1.28 THOUSANDS/ΜL (ref 0.6–4.47)
LYMPHOCYTES NFR BLD AUTO: 17 % (ref 14–44)
MCH RBC QN AUTO: 29.9 PG (ref 26.8–34.3)
MCHC RBC AUTO-ENTMCNC: 32.3 G/DL (ref 31.4–37.4)
MCV RBC AUTO: 93 FL (ref 82–98)
MONOCYTES # BLD AUTO: 0.78 THOUSAND/ΜL (ref 0.17–1.22)
MONOCYTES NFR BLD AUTO: 10 % (ref 4–12)
NEUTROPHILS # BLD AUTO: 5.55 THOUSANDS/ΜL (ref 1.85–7.62)
NEUTS SEG NFR BLD AUTO: 72 % (ref 43–75)
NONHDLC SERPL-MCNC: 49 MG/DL
NRBC BLD AUTO-RTO: 0 /100 WBCS
PLATELET # BLD AUTO: 172 THOUSANDS/UL (ref 149–390)
PMV BLD AUTO: 11.6 FL (ref 8.9–12.7)
POTASSIUM SERPL-SCNC: 4.2 MMOL/L (ref 3.5–5.3)
PROT SERPL-MCNC: 6.7 G/DL (ref 6.4–8.2)
RBC # BLD AUTO: 4.21 MILLION/UL (ref 3.88–5.62)
SODIUM SERPL-SCNC: 134 MMOL/L (ref 136–145)
TRIGL SERPL-MCNC: 49 MG/DL
TSH SERPL DL<=0.05 MIU/L-ACNC: 1.36 UIU/ML (ref 0.36–3.74)
WBC # BLD AUTO: 7.75 THOUSAND/UL (ref 4.31–10.16)

## 2021-06-28 PROCEDURE — 80061 LIPID PANEL: CPT

## 2021-06-28 PROCEDURE — 85025 COMPLETE CBC W/AUTO DIFF WBC: CPT

## 2021-06-28 PROCEDURE — 80053 COMPREHEN METABOLIC PANEL: CPT

## 2021-06-28 PROCEDURE — 36415 COLL VENOUS BLD VENIPUNCTURE: CPT

## 2021-06-28 PROCEDURE — 84443 ASSAY THYROID STIM HORMONE: CPT

## 2021-06-28 PROCEDURE — 83036 HEMOGLOBIN GLYCOSYLATED A1C: CPT

## 2021-08-08 DIAGNOSIS — D80.3 IGG DEFICIENCY (HCC): Primary | ICD-10-CM

## 2021-08-08 DIAGNOSIS — C91.10 CLL (CHRONIC LYMPHOCYTIC LEUKEMIA) (HCC): ICD-10-CM

## 2021-09-01 DIAGNOSIS — D80.3 IGG DEFICIENCY (HCC): Primary | ICD-10-CM

## 2021-09-01 DIAGNOSIS — C91.10 CLL (CHRONIC LYMPHOCYTIC LEUKEMIA) (HCC): ICD-10-CM

## 2021-09-01 RX ORDER — ACETAMINOPHEN 325 MG/1
650 TABLET ORAL ONCE
Status: CANCELLED | OUTPATIENT
Start: 2021-09-03

## 2021-09-01 RX ORDER — DIPHENHYDRAMINE HCL 25 MG
12.5 TABLET ORAL ONCE
Status: CANCELLED | OUTPATIENT
Start: 2021-09-03

## 2021-09-01 RX ORDER — SODIUM CHLORIDE 9 MG/ML
20 INJECTION, SOLUTION INTRAVENOUS ONCE
Status: CANCELLED | OUTPATIENT
Start: 2021-09-03

## 2021-09-02 ENCOUNTER — TELEPHONE (OUTPATIENT)
Dept: INFUSION CENTER | Facility: CLINIC | Age: 81
End: 2021-09-02

## 2021-09-03 ENCOUNTER — HOSPITAL ENCOUNTER (OUTPATIENT)
Dept: INFUSION CENTER | Facility: CLINIC | Age: 81
Discharge: HOME | End: 2021-09-03
Payer: MEDICARE

## 2021-09-03 VITALS
WEIGHT: 163.5 LBS | RESPIRATION RATE: 18 BRPM | HEIGHT: 70 IN | SYSTOLIC BLOOD PRESSURE: 145 MMHG | HEART RATE: 77 BPM | DIASTOLIC BLOOD PRESSURE: 65 MMHG | BODY MASS INDEX: 23.41 KG/M2 | TEMPERATURE: 97.8 F | OXYGEN SATURATION: 94 %

## 2021-09-03 DIAGNOSIS — C91.10 CLL (CHRONIC LYMPHOCYTIC LEUKEMIA) (HCC): ICD-10-CM

## 2021-09-03 DIAGNOSIS — D80.3 IGG DEFICIENCY (HCC): Primary | ICD-10-CM

## 2021-09-03 PROCEDURE — 96375 TX/PRO/DX INJ NEW DRUG ADDON: CPT

## 2021-09-03 PROCEDURE — 96365 THER/PROPH/DIAG IV INF INIT: CPT

## 2021-09-03 PROCEDURE — 96366 THER/PROPH/DIAG IV INF ADDON: CPT

## 2021-09-03 RX ORDER — SODIUM CHLORIDE 9 MG/ML
20 INJECTION, SOLUTION INTRAVENOUS ONCE
Status: CANCELLED | OUTPATIENT
Start: 2021-10-29

## 2021-09-03 RX ORDER — DIPHENHYDRAMINE HCL 25 MG
12.5 TABLET ORAL ONCE
Status: CANCELLED | OUTPATIENT
Start: 2021-10-29

## 2021-09-03 RX ORDER — ACETAMINOPHEN 325 MG/1
650 TABLET ORAL ONCE
Status: COMPLETED | OUTPATIENT
Start: 2021-09-03 | End: 2021-09-03

## 2021-09-03 RX ORDER — DIPHENHYDRAMINE HCL 25 MG
12.5 TABLET ORAL ONCE
Status: COMPLETED | OUTPATIENT
Start: 2021-09-03 | End: 2021-09-03

## 2021-09-03 RX ORDER — ACETAMINOPHEN 325 MG/1
650 TABLET ORAL ONCE
Status: CANCELLED | OUTPATIENT
Start: 2021-10-29

## 2021-09-03 RX ORDER — SODIUM CHLORIDE 9 MG/ML
20 INJECTION, SOLUTION INTRAVENOUS ONCE
Status: COMPLETED | OUTPATIENT
Start: 2021-09-03 | End: 2021-09-03

## 2021-09-03 RX ADMIN — DIPHENHYDRAMINE HCL 12.5 MG: 25 TABLET, COATED ORAL at 10:32

## 2021-09-03 RX ADMIN — ACETAMINOPHEN 650 MG: 325 TABLET ORAL at 10:32

## 2021-09-03 RX ADMIN — Medication 15 G: at 11:06

## 2021-09-03 RX ADMIN — HYDROCORTISONE SODIUM SUCCINATE 100 MG: 100 INJECTION, POWDER, FOR SOLUTION INTRAMUSCULAR; INTRAVENOUS at 10:32

## 2021-09-03 RX ADMIN — SODIUM CHLORIDE 20 ML/HR: 0.9 INJECTION, SOLUTION INTRAVENOUS at 10:33

## 2021-09-03 NOTE — PROGRESS NOTES
Patient tolerated infusion without issue  Patient decline AVS and will make next appointment today with

## 2021-09-03 NOTE — PROGRESS NOTES
pateint arrives to infusion today for IVIG  Patient offers no complaints  IV placed without issue, vital signs stable  Call bell within reach    Will continue to monitor

## 2021-09-15 ENCOUNTER — APPOINTMENT (OUTPATIENT)
Dept: LAB | Facility: CLINIC | Age: 81
End: 2021-09-15
Payer: MEDICARE

## 2021-09-15 DIAGNOSIS — E11.9 DIABETES MELLITUS WITHOUT COMPLICATION (HCC): ICD-10-CM

## 2021-09-15 DIAGNOSIS — C91.12 CHRONIC LYMPHOCYTIC LEUKEMIA OF B-CELL TYPE IN RELAPSE (HCC): ICD-10-CM

## 2021-09-15 DIAGNOSIS — I73.9 PERIPHERAL VASCULAR DISEASE, UNSPECIFIED (HCC): ICD-10-CM

## 2021-09-15 DIAGNOSIS — I10 ESSENTIAL HYPERTENSION, MALIGNANT: ICD-10-CM

## 2021-09-15 LAB
ALBUMIN SERPL BCP-MCNC: 3.7 G/DL (ref 3.5–5)
ALP SERPL-CCNC: 58 U/L (ref 46–116)
ALT SERPL W P-5'-P-CCNC: 42 U/L (ref 12–78)
ANION GAP SERPL CALCULATED.3IONS-SCNC: 4 MMOL/L (ref 4–13)
AST SERPL W P-5'-P-CCNC: 23 U/L (ref 5–45)
BASOPHILS # BLD AUTO: 0.02 THOUSANDS/ΜL (ref 0–0.1)
BASOPHILS NFR BLD AUTO: 0 % (ref 0–1)
BILIRUB SERPL-MCNC: 0.54 MG/DL (ref 0.2–1)
BUN SERPL-MCNC: 18 MG/DL (ref 5–25)
CALCIUM SERPL-MCNC: 8.9 MG/DL (ref 8.3–10.1)
CHLORIDE SERPL-SCNC: 102 MMOL/L (ref 100–108)
CHOLEST SERPL-MCNC: 110 MG/DL (ref 50–200)
CO2 SERPL-SCNC: 27 MMOL/L (ref 21–32)
CREAT SERPL-MCNC: 0.89 MG/DL (ref 0.6–1.3)
EOSINOPHIL # BLD AUTO: 0.06 THOUSAND/ΜL (ref 0–0.61)
EOSINOPHIL NFR BLD AUTO: 1 % (ref 0–6)
ERYTHROCYTE [DISTWIDTH] IN BLOOD BY AUTOMATED COUNT: 14.2 % (ref 11.6–15.1)
EST. AVERAGE GLUCOSE BLD GHB EST-MCNC: 123 MG/DL
GFR SERPL CREATININE-BSD FRML MDRD: 82 ML/MIN/1.73SQ M
GLUCOSE P FAST SERPL-MCNC: 115 MG/DL (ref 65–99)
HBA1C MFR BLD: 5.9 %
HCT VFR BLD AUTO: 38 % (ref 36.5–49.3)
HDLC SERPL-MCNC: 62 MG/DL
HGB BLD-MCNC: 12.3 G/DL (ref 12–17)
IMM GRANULOCYTES # BLD AUTO: 0.03 THOUSAND/UL (ref 0–0.2)
IMM GRANULOCYTES NFR BLD AUTO: 0 % (ref 0–2)
LDLC SERPL CALC-MCNC: 39 MG/DL (ref 0–100)
LYMPHOCYTES # BLD AUTO: 1.45 THOUSANDS/ΜL (ref 0.6–4.47)
LYMPHOCYTES NFR BLD AUTO: 18 % (ref 14–44)
MCH RBC QN AUTO: 30.5 PG (ref 26.8–34.3)
MCHC RBC AUTO-ENTMCNC: 32.4 G/DL (ref 31.4–37.4)
MCV RBC AUTO: 94 FL (ref 82–98)
MONOCYTES # BLD AUTO: 0.9 THOUSAND/ΜL (ref 0.17–1.22)
MONOCYTES NFR BLD AUTO: 11 % (ref 4–12)
NEUTROPHILS # BLD AUTO: 5.43 THOUSANDS/ΜL (ref 1.85–7.62)
NEUTS SEG NFR BLD AUTO: 70 % (ref 43–75)
NONHDLC SERPL-MCNC: 48 MG/DL
NRBC BLD AUTO-RTO: 0 /100 WBCS
PLATELET # BLD AUTO: 180 THOUSANDS/UL (ref 149–390)
PMV BLD AUTO: 11.3 FL (ref 8.9–12.7)
POTASSIUM SERPL-SCNC: 4.5 MMOL/L (ref 3.5–5.3)
PROT SERPL-MCNC: 6.5 G/DL (ref 6.4–8.2)
RBC # BLD AUTO: 4.03 MILLION/UL (ref 3.88–5.62)
SODIUM SERPL-SCNC: 133 MMOL/L (ref 136–145)
TRIGL SERPL-MCNC: 45 MG/DL
WBC # BLD AUTO: 7.89 THOUSAND/UL (ref 4.31–10.16)

## 2021-09-15 PROCEDURE — 83036 HEMOGLOBIN GLYCOSYLATED A1C: CPT

## 2021-09-15 PROCEDURE — 80061 LIPID PANEL: CPT

## 2021-09-15 PROCEDURE — 36415 COLL VENOUS BLD VENIPUNCTURE: CPT

## 2021-09-15 PROCEDURE — 80053 COMPREHEN METABOLIC PANEL: CPT

## 2021-09-15 PROCEDURE — 85025 COMPLETE CBC W/AUTO DIFF WBC: CPT

## 2021-10-29 ENCOUNTER — HOSPITAL ENCOUNTER (OUTPATIENT)
Dept: INFUSION CENTER | Facility: CLINIC | Age: 81
Discharge: HOME | End: 2021-10-29
Payer: MEDICARE

## 2021-10-29 VITALS
HEART RATE: 74 BPM | OXYGEN SATURATION: 94 % | WEIGHT: 169 LBS | DIASTOLIC BLOOD PRESSURE: 63 MMHG | RESPIRATION RATE: 18 BRPM | SYSTOLIC BLOOD PRESSURE: 157 MMHG | BODY MASS INDEX: 24.25 KG/M2 | TEMPERATURE: 97.1 F

## 2021-10-29 DIAGNOSIS — D80.3 IGG DEFICIENCY (HCC): ICD-10-CM

## 2021-10-29 DIAGNOSIS — C91.10 CLL (CHRONIC LYMPHOCYTIC LEUKEMIA) (HCC): Primary | ICD-10-CM

## 2021-10-29 PROCEDURE — 96366 THER/PROPH/DIAG IV INF ADDON: CPT

## 2021-10-29 PROCEDURE — 96375 TX/PRO/DX INJ NEW DRUG ADDON: CPT

## 2021-10-29 PROCEDURE — 96365 THER/PROPH/DIAG IV INF INIT: CPT

## 2021-10-29 RX ORDER — DIPHENHYDRAMINE HCL 25 MG
12.5 TABLET ORAL ONCE
Status: COMPLETED | OUTPATIENT
Start: 2021-10-29 | End: 2021-10-29

## 2021-10-29 RX ORDER — SODIUM CHLORIDE 9 MG/ML
20 INJECTION, SOLUTION INTRAVENOUS ONCE
Status: CANCELLED | OUTPATIENT
Start: 2021-12-24

## 2021-10-29 RX ORDER — ACETAMINOPHEN 325 MG/1
650 TABLET ORAL ONCE
Status: COMPLETED | OUTPATIENT
Start: 2021-10-29 | End: 2021-10-29

## 2021-10-29 RX ORDER — DIPHENHYDRAMINE HCL 25 MG
12.5 TABLET ORAL ONCE
Status: CANCELLED | OUTPATIENT
Start: 2021-12-24

## 2021-10-29 RX ORDER — ACETAMINOPHEN 325 MG/1
650 TABLET ORAL ONCE
Status: CANCELLED | OUTPATIENT
Start: 2021-12-24

## 2021-10-29 RX ORDER — SODIUM CHLORIDE 9 MG/ML
20 INJECTION, SOLUTION INTRAVENOUS ONCE
Status: COMPLETED | OUTPATIENT
Start: 2021-10-29 | End: 2021-10-29

## 2021-10-29 RX ADMIN — SODIUM CHLORIDE 20 ML/HR: 0.9 INJECTION, SOLUTION INTRAVENOUS at 08:54

## 2021-10-29 RX ADMIN — Medication 15 G: at 09:36

## 2021-10-29 RX ADMIN — DIPHENHYDRAMINE HCL 12.5 MG: 25 TABLET, COATED ORAL at 08:51

## 2021-10-29 RX ADMIN — ACETAMINOPHEN 650 MG: 325 TABLET, FILM COATED ORAL at 08:51

## 2021-10-29 RX ADMIN — HYDROCORTISONE SODIUM SUCCINATE 100 MG: 100 INJECTION, POWDER, FOR SOLUTION INTRAMUSCULAR; INTRAVENOUS at 08:52

## 2021-11-01 ENCOUNTER — OFFICE VISIT (OUTPATIENT)
Dept: PULMONOLOGY | Facility: MEDICAL CENTER | Age: 81
End: 2021-11-01
Payer: MEDICARE

## 2021-11-01 VITALS
RESPIRATION RATE: 12 BRPM | SYSTOLIC BLOOD PRESSURE: 124 MMHG | TEMPERATURE: 97.7 F | WEIGHT: 170 LBS | DIASTOLIC BLOOD PRESSURE: 76 MMHG | BODY MASS INDEX: 24.34 KG/M2 | OXYGEN SATURATION: 96 % | HEIGHT: 70 IN | HEART RATE: 56 BPM

## 2021-11-01 DIAGNOSIS — G47.33 OBSTRUCTIVE SLEEP APNEA: Primary | Chronic | ICD-10-CM

## 2021-11-01 DIAGNOSIS — C91.10 CLL (CHRONIC LYMPHOCYTIC LEUKEMIA) (HCC): ICD-10-CM

## 2021-11-01 DIAGNOSIS — J45.20 MILD INTERMITTENT ASTHMA WITHOUT COMPLICATION: ICD-10-CM

## 2021-11-01 PROCEDURE — 99214 OFFICE O/P EST MOD 30 MIN: CPT | Performed by: INTERNAL MEDICINE

## 2021-11-01 RX ORDER — HUMAN IMMUNOGLOBULIN G 5 G/50ML
INJECTION, SOLUTION INTRAVENOUS
COMMUNITY
Start: 2021-05-17

## 2021-11-19 PROBLEM — J45.20 MILD INTERMITTENT ASTHMA WITHOUT COMPLICATION: Status: ACTIVE | Noted: 2019-05-06

## 2021-12-02 ENCOUNTER — TELEPHONE (OUTPATIENT)
Dept: INFUSION CENTER | Facility: CLINIC | Age: 81
End: 2021-12-02

## 2021-12-24 ENCOUNTER — HOSPITAL ENCOUNTER (OUTPATIENT)
Dept: INFUSION CENTER | Facility: CLINIC | Age: 81
Discharge: HOME | End: 2021-12-24
Payer: MEDICARE

## 2021-12-24 VITALS
RESPIRATION RATE: 18 BRPM | SYSTOLIC BLOOD PRESSURE: 134 MMHG | DIASTOLIC BLOOD PRESSURE: 60 MMHG | WEIGHT: 165 LBS | TEMPERATURE: 97.2 F | HEART RATE: 64 BPM | BODY MASS INDEX: 23.68 KG/M2

## 2021-12-24 DIAGNOSIS — C91.10 CLL (CHRONIC LYMPHOCYTIC LEUKEMIA) (HCC): Primary | ICD-10-CM

## 2021-12-24 DIAGNOSIS — D80.3 IGG DEFICIENCY (HCC): ICD-10-CM

## 2021-12-24 PROCEDURE — 96366 THER/PROPH/DIAG IV INF ADDON: CPT

## 2021-12-24 PROCEDURE — 96365 THER/PROPH/DIAG IV INF INIT: CPT

## 2021-12-24 PROCEDURE — 96375 TX/PRO/DX INJ NEW DRUG ADDON: CPT

## 2021-12-24 RX ORDER — ACETAMINOPHEN 325 MG/1
650 TABLET ORAL ONCE
Status: COMPLETED | OUTPATIENT
Start: 2021-12-24 | End: 2021-12-24

## 2021-12-24 RX ORDER — SODIUM CHLORIDE 9 MG/ML
20 INJECTION, SOLUTION INTRAVENOUS ONCE
Status: CANCELLED | OUTPATIENT
Start: 2022-02-18

## 2021-12-24 RX ORDER — DIPHENHYDRAMINE HCL 25 MG
12.5 TABLET ORAL ONCE
Status: COMPLETED | OUTPATIENT
Start: 2021-12-24 | End: 2021-12-24

## 2021-12-24 RX ORDER — ACETAMINOPHEN 325 MG/1
650 TABLET ORAL ONCE
Status: CANCELLED | OUTPATIENT
Start: 2022-02-18

## 2021-12-24 RX ORDER — DIPHENHYDRAMINE HCL 25 MG
12.5 TABLET ORAL ONCE
Status: CANCELLED | OUTPATIENT
Start: 2022-02-18

## 2021-12-24 RX ORDER — SODIUM CHLORIDE 9 MG/ML
20 INJECTION, SOLUTION INTRAVENOUS ONCE
Status: COMPLETED | OUTPATIENT
Start: 2021-12-24 | End: 2021-12-24

## 2021-12-24 RX ADMIN — SODIUM CHLORIDE 20 ML/HR: 0.9 INJECTION, SOLUTION INTRAVENOUS at 08:16

## 2021-12-24 RX ADMIN — HYDROCORTISONE SODIUM SUCCINATE 100 MG: 100 INJECTION, POWDER, FOR SOLUTION INTRAMUSCULAR; INTRAVENOUS at 08:16

## 2021-12-24 RX ADMIN — Medication 15 G: at 08:49

## 2021-12-24 RX ADMIN — ACETAMINOPHEN 650 MG: 325 TABLET, FILM COATED ORAL at 08:16

## 2021-12-24 RX ADMIN — DIPHENHYDRAMINE HCL 12.5 MG: 25 TABLET, COATED ORAL at 08:16

## 2022-01-10 NOTE — PRE-PROCEDURE INSTRUCTIONS
My Surgical Experience    The following information was developed to assist you to prepare for your operation  What do I need to do before coming to the hospital?   Arrange for a responsible person to drive you to and from the hospital    Arrange care for your children at home  Children are not allowed in the recovery areas of the hospital   Plan to wear clothing that is easy to put on and take off  If you are having shoulder surgery, wear a shirt that buttons or zippers in the front  Bathing  o Shower the evening before and the morning of your surgery with an antibacterial soap  Please refer to the Pre Op Showering Instructions for Surgery Patients Sheet   o Remove nail polish and all body piercing jewelry  o Do not shave any body part for at least 24 hours before surgery-this includes face, arms, legs and upper body  Food  o Nothing to eat or drink after midnight the night before your surgery  This includes candy and chewing gum  o Exception: If your surgery is after 12:00pm (noon), you may have clear liquids such as 7-Up®, ginger ale, apple or cranberry juice, Jell-O®, water, or clear broth until 8:00 am  o Do not drink milk or juice with pulp on the morning before surgery  o Do not drink alcohol 24 hours before surgery  Medicine  o Follow instructions you received from your surgeon about which medicines you may take on the day of surgery  o If instructed to take medicine on the morning of surgery, take pills with just a small sip of water  Call your prescribing doctor for specific infroamtion on what to do if you take insulin    What should I bring to the hospital?    Bring:  Ricardo Álvarez or a walker, if you have them, for foot or knee surgery   A list of the daily medicines, vitamins, minerals, herbals and nutritional supplements you take   Include the dosages of medicines and the time you take them each day   Glasses, dentures or hearing aids   Minimal clothing; you will be wearing hospital sleepwear   Photo ID; required to verify your identity   If you have a Living Will or Power of , bring a copy of the documents   If you have an ostomy, bring an extra pouch and any supplies you use    Do not bring   Medicines or inhalers   Money, valuables or jewelry    What other information should I know about the day of surgery?  Notify your surgeons if you develop a cold, sore throat, cough, fever, rash or any other illness   Report to the Ambulatory Surgical/Same Day Surgery Unit   You will be instructed to stop at Registration only if you have not been pre-registered   Inform your  fi they do not stay that they will be asked by the staff to leave a phone number where they can be reached   Be available to be reached before surgery  In the event the operating room schedule changes, you may be asked to come in earlier or later than expected    *It is important to tell your doctor and others involved in your health care if you are taking or have been taking any non-prescription drugs, vitamins, minerals, herbals or other nutritional supplements  Any of these may interact with some food or medicines and cause a reaction      Pre-Surgery Instructions:   Medication Instructions    Acalabrutinib (Calquence) 100 MG CAPS Instructed patient per Anesthesia Guidelines   buPROPion (WELLBUTRIN XL) 300 mg 24 hr tablet Instructed patient per Anesthesia Guidelines   Cholecalciferol (Vitamin D3) 50 MCG (2000 UT) capsule Instructed patient per Anesthesia Guidelines   immune globulin, human (Bivigam) 5 GM/50ML Instructed patient per Anesthesia Guidelines   lisinopril (ZESTRIL) 20 mg tablet Instructed patient per Anesthesia Guidelines   metoprolol succinate (TOPROL-XL) 25 mg 24 hr tablet Instructed patient per Anesthesia Guidelines   Multiple Vitamins-Minerals (PRESERVISION AREDS 2 PO) Instructed patient per Anesthesia Guidelines      rosuvastatin (CRESTOR) 10 MG tablet Instructed patient per Anesthesia Guidelines   tadalafil (CIALIS) 5 MG tablet Instructed patient per Anesthesia Guidelines  To take metoprolol a m  of surgery

## 2022-01-11 ENCOUNTER — HOSPITAL ENCOUNTER (OUTPATIENT)
Facility: HOSPITAL | Age: 82
Setting detail: OUTPATIENT SURGERY
Discharge: HOME | End: 2022-01-11
Attending: SURGERY | Admitting: SURGERY
Payer: MEDICARE

## 2022-01-11 VITALS
RESPIRATION RATE: 16 BRPM | TEMPERATURE: 98.2 F | OXYGEN SATURATION: 94 % | DIASTOLIC BLOOD PRESSURE: 65 MMHG | BODY MASS INDEX: 24.19 KG/M2 | SYSTOLIC BLOOD PRESSURE: 141 MMHG | WEIGHT: 168.6 LBS | HEART RATE: 68 BPM

## 2022-01-11 DIAGNOSIS — D48.5 NEOPLASM OF UNCERTAIN BEHAVIOR OF SKIN: ICD-10-CM

## 2022-01-11 PROCEDURE — 88305 TISSUE EXAM BY PATHOLOGIST: CPT | Performed by: PATHOLOGY

## 2022-01-11 PROCEDURE — 88331 PATH CONSLTJ SURG 1 BLK 1SPC: CPT | Performed by: PATHOLOGY

## 2022-01-11 PROCEDURE — 88332 PATH CONSLTJ SURG EA ADD BLK: CPT | Performed by: PATHOLOGY

## 2022-01-11 RX ORDER — LIDOCAINE HYDROCHLORIDE AND EPINEPHRINE 10; 10 MG/ML; UG/ML
INJECTION, SOLUTION INFILTRATION; PERINEURAL AS NEEDED
Status: DISCONTINUED | OUTPATIENT
Start: 2022-01-11 | End: 2022-01-11 | Stop reason: HOSPADM

## 2022-01-11 NOTE — PERIOPERATIVE NURSING NOTE
Received patient from OR in room 2 , patient is alert and awake, VSS, denies pain, no distress noted  Right cheek Surgical site intact Patient is tolerating PO fluids, call bell placed in reach, will continue to monitor patient until d/c criteria met

## 2022-01-11 NOTE — OP NOTE
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 5]    Procedure(s) (LRB):  EXCISION SKIN LESION CHEEK WITH FROZEN SECTION (Right)    Specimen(s):  ID Type Source Tests Collected by Time Destination   1 : right cheek lesion frozen section Tissue Lesion TISSUE EXAM Stephane Alvarado MD 2022 0747        Estimated Blood Loss:   Minimal    Anesthesia Type:   Local    Operative Indications:  Neoplasm of uncertain behavior of skin [D48 5]  Pt presented with an enlarging, raised round skin lesion of the right cheek which first became apparent a couple months ago  He is fair skinned and has obvious changes due to sun exposure  He also has a history of skin carcinoma  Operative Findings:  The patient has a slightly raised round lesion on the lateral right cheek  The borders are not distinct  There is a small scab in the center of the lesion  It measures 1 2cm at it's widest part  Complications:   None    Procedure and Technique:  The patient was brought to the OR and placed on the table in the supine position  With is head tilted to the left, the area of the lesion was delineated with a skin marker  Local was injected into the tissue  The right cheek was prepped and draped in the usual sterile fashion  With the use of a #15 scalpel, a full thickness excision was performed taking 2-3mm margins  The size of the specimen was ~1 7cm  A suture was placed in the specimen for orientation and it was sent for frozen section  Pathology revealed clear margins with the neoplasm  The defect could be closed primarily  The defect was converted into a horizontal ellipse  The deep dermis was approximated with 4-0 monocryl    The skin was closed with a 6-0 fast absorbing gut  The length of the closure was 4cm  Bacitracin was placed on the sutureline  I was present for the entire procedure    Patient Disposition:  The patient was transferred back to his pre-op room since the case was performed under local   He will be going home        SIGNATURE: Rachael Taylor MD  DATE: January 11, 2022  TIME: 9:13 AM

## 2022-01-11 NOTE — DISCHARGE INSTRUCTIONS
May shower tomorrow, wash the right cheek gently with soap, pat dry with clean washcloth  Bacitracin to R cheek twice daily, no dressing required

## 2022-01-11 NOTE — PERIOPERATIVE NURSING NOTE
Pt d/c to home at this time w/spouse,  Via w/c  Pt left with all belongings  Encouraged to keep follow up appointments, Verbalized understanding  D/C instructions reviewed and explained with patient and spouse  Verbalized understanding

## 2022-01-13 ENCOUNTER — HOSPITAL ENCOUNTER (OUTPATIENT)
Dept: INFUSION CENTER | Facility: HOSPITAL | Age: 82
Discharge: HOME | End: 2022-01-13
Payer: MEDICARE

## 2022-01-13 VITALS
DIASTOLIC BLOOD PRESSURE: 74 MMHG | RESPIRATION RATE: 18 BRPM | SYSTOLIC BLOOD PRESSURE: 130 MMHG | HEART RATE: 72 BPM | TEMPERATURE: 97.2 F | OXYGEN SATURATION: 97 %

## 2022-01-13 PROCEDURE — M0247 HB SOTROVIMAB INF ADMIN: HCPCS | Performed by: INTERNAL MEDICINE

## 2022-01-13 RX ORDER — CLINDAMYCIN HYDROCHLORIDE 150 MG/1
CAPSULE ORAL EVERY 6 HOURS SCHEDULED
COMMUNITY
End: 2022-06-21

## 2022-01-13 RX ORDER — SODIUM CHLORIDE 9 MG/ML
20 INJECTION, SOLUTION INTRAVENOUS ONCE
Status: COMPLETED | OUTPATIENT
Start: 2022-01-13 | End: 2022-01-13

## 2022-01-13 RX ORDER — ALBUTEROL SULFATE 90 UG/1
3 AEROSOL, METERED RESPIRATORY (INHALATION) ONCE AS NEEDED
Status: DISCONTINUED | OUTPATIENT
Start: 2022-01-13 | End: 2022-01-16 | Stop reason: HOSPADM

## 2022-01-13 RX ORDER — ONDANSETRON 2 MG/ML
4 INJECTION INTRAMUSCULAR; INTRAVENOUS ONCE AS NEEDED
Status: DISCONTINUED | OUTPATIENT
Start: 2022-01-13 | End: 2022-01-16 | Stop reason: HOSPADM

## 2022-01-13 RX ORDER — ACETAMINOPHEN 325 MG/1
650 TABLET ORAL ONCE AS NEEDED
Status: DISCONTINUED | OUTPATIENT
Start: 2022-01-13 | End: 2022-01-16 | Stop reason: HOSPADM

## 2022-01-13 RX ADMIN — SODIUM CHLORIDE 20 ML/HR: 0.9 INJECTION, SOLUTION INTRAVENOUS at 17:21

## 2022-01-13 NOTE — PROGRESS NOTES
Patient arrives to infusion center, settled in recliner chair  VSS, IV obtained and IVF infusing without difficulty  EUA provided to patient, verbal consent obtained for administration of Sotrovimab  Patient denies any questions or concerns at this time  Pending arrival from pharmacy

## 2022-01-13 NOTE — PROGRESS NOTES
Sotrovimab arrived from pharmacy and initiated at this time  Patient aware to alert nurse for any adverse reactions or concerns  Patient resting on recliner

## 2022-01-13 NOTE — PROGRESS NOTES
Patient arrives to infusion center, settled in recliner chair  VSS, IV obtained and IVF infusing without difficulty  EUA provided to patient, verbal consent obtained for administration of Sotrovimab  Patient denies any questions or concerns at this time  Pending arrival of Sotrovimab from pharmacy

## 2022-01-13 NOTE — PROGRESS NOTES
Sotrovimab completed at this time, no immediate adverse reactions noted, VSS  Patient aware of x1 hour observation time  IVF infusing

## 2022-03-07 ENCOUNTER — TELEPHONE (OUTPATIENT)
Dept: HEMATOLOGY ONCOLOGY | Facility: CLINIC | Age: 82
End: 2022-03-07

## 2022-03-07 DIAGNOSIS — D80.3 IGG DEFICIENCY (HCC): ICD-10-CM

## 2022-03-07 DIAGNOSIS — C91.10 CLL (CHRONIC LYMPHOCYTIC LEUKEMIA) (HCC): Primary | ICD-10-CM

## 2022-03-07 NOTE — TELEPHONE ENCOUNTER
Patient called in regarding a voicemail left regarding scheduling a follow up appointment  Patient states he is in [facility] in May and will call back in early May to set up a follow up appointment  Patient states he is receiving IV gammagloblin in [facility]

## 2022-03-07 NOTE — TELEPHONE ENCOUNTER
Left a message for patient to call to schedule follow up appointment with our office within a month per Dr Mercedez Mcdaniel

## 2022-04-20 ENCOUNTER — TELEPHONE (OUTPATIENT)
Dept: PULMONOLOGY | Facility: MEDICAL CENTER | Age: 82
End: 2022-04-20

## 2022-05-27 ENCOUNTER — TELEPHONE (OUTPATIENT)
Dept: HEMATOLOGY ONCOLOGY | Facility: CLINIC | Age: 82
End: 2022-05-27

## 2022-06-07 ENCOUNTER — OFFICE VISIT (OUTPATIENT)
Dept: PULMONOLOGY | Facility: MEDICAL CENTER | Age: 82
End: 2022-06-07
Payer: MEDICARE

## 2022-06-07 ENCOUNTER — TRANSCRIBE ORDERS (OUTPATIENT)
Dept: ADMINISTRATIVE | Facility: HOSPITAL | Age: 82
End: 2022-06-07

## 2022-06-07 VITALS
DIASTOLIC BLOOD PRESSURE: 82 MMHG | BODY MASS INDEX: 23.62 KG/M2 | WEIGHT: 165 LBS | TEMPERATURE: 97.2 F | HEIGHT: 70 IN | SYSTOLIC BLOOD PRESSURE: 138 MMHG | HEART RATE: 51 BPM | RESPIRATION RATE: 16 BRPM | OXYGEN SATURATION: 98 %

## 2022-06-07 DIAGNOSIS — G47.33 OBSTRUCTIVE SLEEP APNEA: Chronic | ICD-10-CM

## 2022-06-07 DIAGNOSIS — I70.211: Primary | ICD-10-CM

## 2022-06-07 DIAGNOSIS — J45.20 MILD INTERMITTENT ASTHMA WITHOUT COMPLICATION: Primary | ICD-10-CM

## 2022-06-07 DIAGNOSIS — C91.10 CLL (CHRONIC LYMPHOCYTIC LEUKEMIA) (HCC): ICD-10-CM

## 2022-06-07 DIAGNOSIS — J45.30 MILD PERSISTENT ASTHMA WITHOUT COMPLICATION: ICD-10-CM

## 2022-06-07 PROCEDURE — 99214 OFFICE O/P EST MOD 30 MIN: CPT | Performed by: INTERNAL MEDICINE

## 2022-06-07 RX ORDER — FLUTICASONE FUROATE 100 UG/1
1 POWDER RESPIRATORY (INHALATION) DAILY
Qty: 1 BLISTER | Refills: 6 | Status: SHIPPED | OUTPATIENT
Start: 2022-06-07 | End: 2022-07-20

## 2022-06-07 RX ORDER — ALBUTEROL SULFATE 90 UG/1
2 AEROSOL, METERED RESPIRATORY (INHALATION) EVERY 4 HOURS PRN
Qty: 8.5 G | Refills: 6 | Status: SHIPPED | OUTPATIENT
Start: 2022-06-07

## 2022-06-07 NOTE — PATIENT INSTRUCTIONS
Start Arnuity 100 mcg 1 puff daily  I will send this to [facility] for 1 month then sent 3 month supply to [facility]  Rinse mouth with water after use    I will contact [facility] about and AirFit F30 fullface mask  I ordered a medium size for you  Call us in 3-4 weeks and I can look at your CPAP machine data to see if this is working better    Peak flow rate today was 370 liters/minute    Predicted range is 425 to 530 l/m    Backline  [phone]

## 2022-06-07 NOTE — PROGRESS NOTES
Assessment/Plan        Problem List Items Addressed This Visit        Respiratory    Obstructive sleep apnea (Chronic)     He does have moderate STEFANY is not been using his CPAP for last 3 weeks  I did encourage start using his CPAP again  He is on auto CPAP with pressure range of 12-18 cm water  Mild intermittent asthma without complication - Primary     He has had periodic shortness of breath recent specially when doing yd work  I did give prescription restart his Arnuity 100 mcg 1 puff daily and uses albuterol inhaler as needed  Peak flow rate today was 370 liters/minute  Predicted peak flow range is 425-530 liters/minute  Relevant Medications    Arnuity Ellipta 100 MCG/ACT AEPB inhaler    albuterol (ProAir HFA) 90 mcg/act inhaler       Other    CLL (chronic lymphocytic leukemia) (HCC)     Has had CLL for several years and is on acalabrutinib ([place])  for this  Other Visit Diagnoses     Mild persistent asthma without complication        Relevant Medications    Arnuity Ellipta 100 MCG/ACT AEPB inhaler    albuterol (ProAir HFA) 90 mcg/act inhaler            Cc: Some intermittent shortness of breath recently      [facility]     Alvaro Guido states he has been having some intermittent shortness of breath  Recently was clipping some bushes and after that he started experiencing some shortness of breath chest tightness  Is only using his albuterol inhaler as needed  He needs a refill on his albuterol inhaler also has not been using his steroid inhaler for quite some time  Regarding to his STEFANY he has not been using his CPAP machine the last 3 weeks  Does not care for his full face mask  Often puts pressure on his nasal bridge  Not having any excessive daytime somnolence      Past Medical History:   Diagnosis Date    Arthritis     BPH (benign prostatic hyperplasia)     Cancer (HCC)     Chronic lymphocytic leukemia (CLL), B-cell (HCC)     CPAP (continuous positive airway pressure) dependence     Depression     Hearing aid worn     bilateral    Hyperlipidemia     controlled    Hypertension     controlled    Sinusitis     Sleep apnea     CPAP    Tinnitus     Wears glasses        Past Surgical History:   Procedure Laterality Date    BACK SURGERY  09/2017    laminotomy L3,4 S1-    CARPAL TUNNEL RELEASE Bilateral     CATARACT EXTRACTION      left    CATARACT EXTRACTION W/ INTRAOCULAR LENS IMPLANT Left 12/11/2017    Procedure: EXTRACTION EXTRACAPSULAR CATARACT PHACO INTRAOCULAR LENS (IOL);   Surgeon: Gay Rodriguez MD;  [facility] MAIN OR;  Service: Ophthalmology    CERVICAL DISCECTOMY  1982    C5-6    COLONOSCOPY      HERNIA REPAIR      l inguinal    IR LOWER EXTREMITY / INTERVENTION  12/2/2019    [place] EXC SKIN MALIG 1 1-2 CM FACE,FACIAL Right 1/11/2022    Procedure: EXCISION SKIN LESION CHEEK WITH FROZEN SECTION;  Surgeon: Vincent Brown MD;  Location: [facility];  Service: Plastics    ROTATOR CUFF REPAIR Left 2006    TONSILLECTOMY           Current Outpatient Medications:     Acalabrutinib 100 MG CAPS, Take 1 capsule by mouth 2 (two) times a day, Disp: , Rfl:     albuterol (ProAir HFA) 90 mcg/act inhaler, Inhale 2 puffs every 4 (four) hours as needed for wheezing, Disp: 8 5 g, Rfl: 6    Arnuity Ellipta 100 MCG/ACT AEPB inhaler, Inhale 1 puff daily Rinse mouth after use , Disp: 1 blister, Rfl: 6    buPROPion (WELLBUTRIN XL) 300 mg 24 hr tablet, Take 300 mg by mouth every morning, Disp: , Rfl:     immune globulin, human (Bivigam) 5 GM/50ML, 15mg IV every 8 weeks, Disp: , Rfl:     lisinopril (ZESTRIL) 20 mg tablet, Take 20 mg by mouth 2 (two) times a day, Disp: , Rfl:     Multiple Vitamins-Minerals (PRESERVISION AREDS 2 PO), Take by mouth 2 (two) times a day, Disp: , Rfl:     metoprolol succinate (TOPROL-XL) 25 mg 24 hr tablet, Take 25 mg by mouth every morning Takes 1 1/12 a m  , Disp: , Rfl:     pantoprazole (PROTONIX) 40 mg tablet, Take 1 tablet (40 mg total) by mouth daily, Disp: 30 tablet, Rfl: 1    rosuvastatin (CRESTOR) 10 MG tablet, Take 10 mg by mouth daily  , Disp: , Rfl:     tadalafil (CIALIS) 5 MG tablet, , Disp: , Rfl:   No current facility-administered medications for this visit  Allergies   Allergen Reactions    Rocephin [Ceftriaxone]      Avoids d/t previous liver toxicity       Social History     Tobacco Use    Smoking status: Former Smoker     Packs/day: 1 00     Years: 20 00     Pack years: 20 00     Quit date: 5     Years since quittin 4    Smokeless tobacco: Never Used   Substance Use Topics    Alcohol use: Yes     Alcohol/week: 7 0 standard drinks     Types: 7 Glasses of wine per week         Family History   Problem Relation Age of Onset    Cancer Mother         bladder    Heart disease Father     Stroke Father     Parkinsonism Brother        Review of Systems   Constitutional: Negative for chills, fever and unexpected weight change  HENT: Negative for congestion, rhinorrhea and sore throat  Eyes: Negative for discharge and redness  Respiratory: Positive for shortness of breath  Cardiovascular: Negative for chest pain, palpitations and leg swelling  Gastrointestinal: Negative for abdominal distention, abdominal pain and nausea  Endocrine: Negative for polydipsia and polyphagia  Genitourinary: Negative for dysuria  Musculoskeletal: Negative for joint swelling and myalgias  Skin: Negative for rash  Neurological: Negative for light-headedness  Psychiatric/Behavioral: Negative for decreased concentration  Height 5 ft 10 in tall weight 166 lb BMI 23 82    Vitals:    22 0813   BP: 138/82   Pulse: (!) 51   Resp: 16   Temp: (!) 97 2 °F (36 2 °C)   SpO2: 98%           Physical Exam  Vitals reviewed  Constitutional:       General: He is not in acute distress  Appearance: Normal appearance  He is well-developed  HENT:      Head: Normocephalic        Right Ear: External ear normal       Nose: Nose normal       Mouth/Throat:      Mouth: Mucous membranes are dry  Pharynx: Oropharynx is clear  No oropharyngeal exudate  Eyes:      Conjunctiva/sclera: Conjunctivae normal       Pupils: Pupils are equal, round, and reactive to light  Cardiovascular:      Rate and Rhythm: Normal rate and regular rhythm  Heart sounds: Normal heart sounds  Pulmonary:      Effort: Pulmonary effort is normal       Comments: Lung sounds clear  No wheezes, crackles or rhonchi  Abdominal:      General: There is no distension  Palpations: Abdomen is soft  Tenderness: There is no abdominal tenderness  Musculoskeletal:      Cervical back: Neck supple  Comments: No edema, cyanosis or clubbing   Lymphadenopathy:      Cervical: No cervical adenopathy  Skin:     General: Skin is warm and dry  Neurological:      General: No focal deficit present  Mental Status: He is alert and oriented to person, place, and time     Psychiatric:         Mood and Affect: Mood normal          Behavior: Behavior normal

## 2022-06-14 DIAGNOSIS — C91.10 CLL (CHRONIC LYMPHOCYTIC LEUKEMIA) (HCC): ICD-10-CM

## 2022-06-14 DIAGNOSIS — D80.3 IGG DEFICIENCY (HCC): ICD-10-CM

## 2022-06-14 DIAGNOSIS — C91.10 CLL (CHRONIC LYMPHOCYTIC LEUKEMIA) (HCC): Primary | ICD-10-CM

## 2022-06-14 DIAGNOSIS — D80.3 IGG DEFICIENCY (HCC): Primary | ICD-10-CM

## 2022-06-14 RX ORDER — ACETAMINOPHEN 325 MG/1
650 TABLET ORAL ONCE
Status: CANCELLED | OUTPATIENT
Start: 2022-06-16

## 2022-06-14 RX ORDER — DIPHENHYDRAMINE HCL 25 MG
12.5 TABLET ORAL ONCE
Status: CANCELLED | OUTPATIENT
Start: 2022-06-16

## 2022-06-14 RX ORDER — SODIUM CHLORIDE 9 MG/ML
20 INJECTION, SOLUTION INTRAVENOUS ONCE
Status: CANCELLED | OUTPATIENT
Start: 2022-06-16

## 2022-06-15 ENCOUNTER — TELEPHONE (OUTPATIENT)
Dept: HEMATOLOGY ONCOLOGY | Facility: CLINIC | Age: 82
End: 2022-06-15

## 2022-06-15 DIAGNOSIS — D80.3 IGG DEFICIENCY (HCC): Primary | ICD-10-CM

## 2022-06-15 DIAGNOSIS — C91.10 CLL (CHRONIC LYMPHOCYTIC LEUKEMIA) (HCC): ICD-10-CM

## 2022-06-15 NOTE — TELEPHONE ENCOUNTER
Received notification that patient received 30 grams of IVIG at outside facility when he is in [facility]    Per Dr Ventura Estimable ok to increase dose to 400mg/kg - 30 grams  Dose adjustment made and sent to him for signature

## 2022-06-16 ENCOUNTER — HOSPITAL ENCOUNTER (OUTPATIENT)
Dept: INFUSION CENTER | Facility: CLINIC | Age: 82
Discharge: HOME | End: 2022-06-16
Payer: MEDICARE

## 2022-06-16 VITALS
HEART RATE: 56 BPM | RESPIRATION RATE: 18 BRPM | TEMPERATURE: 96.8 F | SYSTOLIC BLOOD PRESSURE: 142 MMHG | WEIGHT: 166 LBS | OXYGEN SATURATION: 97 % | BODY MASS INDEX: 23.82 KG/M2 | DIASTOLIC BLOOD PRESSURE: 72 MMHG

## 2022-06-16 DIAGNOSIS — D80.3 IGG DEFICIENCY (HCC): Primary | ICD-10-CM

## 2022-06-16 DIAGNOSIS — C91.10 CLL (CHRONIC LYMPHOCYTIC LEUKEMIA) (HCC): ICD-10-CM

## 2022-06-16 PROCEDURE — 96366 THER/PROPH/DIAG IV INF ADDON: CPT

## 2022-06-16 PROCEDURE — 96375 TX/PRO/DX INJ NEW DRUG ADDON: CPT

## 2022-06-16 PROCEDURE — 96365 THER/PROPH/DIAG IV INF INIT: CPT

## 2022-06-16 RX ORDER — ACETAMINOPHEN 325 MG/1
650 TABLET ORAL ONCE
OUTPATIENT
Start: 2022-08-11

## 2022-06-16 RX ORDER — DIPHENHYDRAMINE HCL 25 MG
12.5 TABLET ORAL ONCE
Status: COMPLETED | OUTPATIENT
Start: 2022-06-16 | End: 2022-06-16

## 2022-06-16 RX ORDER — ACETAMINOPHEN 325 MG/1
650 TABLET ORAL ONCE
Status: COMPLETED | OUTPATIENT
Start: 2022-06-16 | End: 2022-06-16

## 2022-06-16 RX ORDER — SODIUM CHLORIDE 9 MG/ML
20 INJECTION, SOLUTION INTRAVENOUS ONCE
Status: COMPLETED | OUTPATIENT
Start: 2022-06-16 | End: 2022-06-16

## 2022-06-16 RX ORDER — SODIUM CHLORIDE 9 MG/ML
20 INJECTION, SOLUTION INTRAVENOUS ONCE
OUTPATIENT
Start: 2022-08-11

## 2022-06-16 RX ORDER — DIPHENHYDRAMINE HCL 25 MG
12.5 TABLET ORAL ONCE
OUTPATIENT
Start: 2022-08-11

## 2022-06-16 RX ADMIN — SODIUM CHLORIDE 20 ML/HR: 0.9 INJECTION, SOLUTION INTRAVENOUS at 08:24

## 2022-06-16 RX ADMIN — HYDROCORTISONE SODIUM SUCCINATE 100 MG: 100 INJECTION, POWDER, FOR SOLUTION INTRAMUSCULAR; INTRAVENOUS at 08:31

## 2022-06-16 RX ADMIN — DIPHENHYDRAMINE HCL 12.5 MG: 25 TABLET, COATED ORAL at 08:26

## 2022-06-16 RX ADMIN — ACETAMINOPHEN 650 MG: 325 TABLET ORAL at 08:26

## 2022-06-16 RX ADMIN — Medication 30 G: at 09:01

## 2022-06-16 NOTE — PROGRESS NOTES
Pt here for IVIG  Labs reviewed from 5/23  Treatment tolerated well without complications  Aware to schedule next appointment upon discharge  AVS declined

## 2022-06-21 ENCOUNTER — TELEPHONE (OUTPATIENT)
Dept: PULMONOLOGY | Facility: CLINIC | Age: 82
End: 2022-06-21

## 2022-06-21 ENCOUNTER — HOSPITAL ENCOUNTER (OUTPATIENT)
Facility: HOSPITAL | Age: 82
Setting detail: OBSERVATION
Discharge: HOME | End: 2022-06-22
Attending: EMERGENCY MEDICINE | Admitting: INTERNAL MEDICINE
Payer: MEDICARE

## 2022-06-21 ENCOUNTER — TELEPHONE (OUTPATIENT)
Dept: OTHER | Facility: OTHER | Age: 82
End: 2022-06-21

## 2022-06-21 DIAGNOSIS — K29.01 OTHER ACUTE GASTRITIS WITH HEMORRHAGE: ICD-10-CM

## 2022-06-21 DIAGNOSIS — K92.0 HEMATEMESIS WITHOUT NAUSEA: ICD-10-CM

## 2022-06-21 DIAGNOSIS — K92.0 HEMATEMESIS WITH NAUSEA: Primary | ICD-10-CM

## 2022-06-21 DIAGNOSIS — K31.7 GASTRIC POLYP: ICD-10-CM

## 2022-06-21 LAB
ALBUMIN SERPL BCP-MCNC: 4.3 G/DL (ref 3.5–5)
ALP SERPL-CCNC: 47 U/L (ref 34–104)
ALT SERPL W P-5'-P-CCNC: 20 U/L (ref 7–52)
ANION GAP SERPL CALCULATED.3IONS-SCNC: 7 MMOL/L (ref 4–13)
APTT PPP: 24 SECONDS (ref 23–37)
AST SERPL W P-5'-P-CCNC: 19 U/L (ref 13–39)
BASOPHILS # BLD AUTO: 0.01 THOUSANDS/ΜL (ref 0–0.1)
BASOPHILS NFR BLD AUTO: 0 % (ref 0–1)
BILIRUB SERPL-MCNC: 0.82 MG/DL (ref 0.2–1)
BUN SERPL-MCNC: 23 MG/DL (ref 5–25)
CALCIUM SERPL-MCNC: 9.6 MG/DL (ref 8.4–10.2)
CHLORIDE SERPL-SCNC: 100 MMOL/L (ref 96–108)
CO2 SERPL-SCNC: 28 MMOL/L (ref 21–32)
CREAT SERPL-MCNC: 1.04 MG/DL (ref 0.6–1.3)
EOSINOPHIL # BLD AUTO: 0.11 THOUSAND/ΜL (ref 0–0.61)
EOSINOPHIL NFR BLD AUTO: 2 % (ref 0–6)
ERYTHROCYTE [DISTWIDTH] IN BLOOD BY AUTOMATED COUNT: 13.2 % (ref 11.6–15.1)
GFR SERPL CREATININE-BSD FRML MDRD: 67 ML/MIN/1.73SQ M
GLUCOSE SERPL-MCNC: 164 MG/DL (ref 65–140)
HCT VFR BLD AUTO: 39.8 % (ref 36.5–49.3)
HGB BLD-MCNC: 13.2 G/DL (ref 12–17)
IMM GRANULOCYTES # BLD AUTO: 0.03 THOUSAND/UL (ref 0–0.2)
IMM GRANULOCYTES NFR BLD AUTO: 0 % (ref 0–2)
INR PPP: 0.98 (ref 0.84–1.19)
LIPASE SERPL-CCNC: 19 U/L (ref 11–82)
LYMPHOCYTES # BLD AUTO: 1.03 THOUSANDS/ΜL (ref 0.6–4.47)
LYMPHOCYTES NFR BLD AUTO: 14 % (ref 14–44)
MCH RBC QN AUTO: 31.9 PG (ref 26.8–34.3)
MCHC RBC AUTO-ENTMCNC: 33.2 G/DL (ref 31.4–37.4)
MCV RBC AUTO: 96 FL (ref 82–98)
MONOCYTES # BLD AUTO: 0.88 THOUSAND/ΜL (ref 0.17–1.22)
MONOCYTES NFR BLD AUTO: 12 % (ref 4–12)
NEUTROPHILS # BLD AUTO: 5.16 THOUSANDS/ΜL (ref 1.85–7.62)
NEUTS SEG NFR BLD AUTO: 72 % (ref 43–75)
NRBC BLD AUTO-RTO: 0 /100 WBCS
PLATELET # BLD AUTO: 162 THOUSANDS/UL (ref 149–390)
PMV BLD AUTO: 11.3 FL (ref 8.9–12.7)
POTASSIUM SERPL-SCNC: 4.3 MMOL/L (ref 3.5–5.3)
PROT SERPL-MCNC: 6.9 G/DL (ref 6.4–8.4)
PROTHROMBIN TIME: 13 SECONDS (ref 11.6–14.5)
RBC # BLD AUTO: 4.14 MILLION/UL (ref 3.88–5.62)
SODIUM SERPL-SCNC: 135 MMOL/L (ref 135–147)
WBC # BLD AUTO: 7.22 THOUSAND/UL (ref 4.31–10.16)

## 2022-06-21 PROCEDURE — 99285 EMERGENCY DEPT VISIT HI MDM: CPT

## 2022-06-21 PROCEDURE — 99285 EMERGENCY DEPT VISIT HI MDM: CPT | Performed by: EMERGENCY MEDICINE

## 2022-06-21 PROCEDURE — 85610 PROTHROMBIN TIME: CPT | Performed by: EMERGENCY MEDICINE

## 2022-06-21 PROCEDURE — 99235 HOSP IP/OBS SAME DATE MOD 70: CPT | Performed by: INTERNAL MEDICINE

## 2022-06-21 PROCEDURE — 96374 THER/PROPH/DIAG INJ IV PUSH: CPT

## 2022-06-21 PROCEDURE — 83690 ASSAY OF LIPASE: CPT | Performed by: EMERGENCY MEDICINE

## 2022-06-21 PROCEDURE — C9113 INJ PANTOPRAZOLE SODIUM, VIA: HCPCS | Performed by: EMERGENCY MEDICINE

## 2022-06-21 PROCEDURE — 85730 THROMBOPLASTIN TIME PARTIAL: CPT | Performed by: EMERGENCY MEDICINE

## 2022-06-21 PROCEDURE — 85025 COMPLETE CBC W/AUTO DIFF WBC: CPT | Performed by: EMERGENCY MEDICINE

## 2022-06-21 PROCEDURE — 93005 ELECTROCARDIOGRAM TRACING: CPT

## 2022-06-21 PROCEDURE — 80053 COMPREHEN METABOLIC PANEL: CPT | Performed by: EMERGENCY MEDICINE

## 2022-06-21 PROCEDURE — 36415 COLL VENOUS BLD VENIPUNCTURE: CPT

## 2022-06-21 RX ORDER — ALBUTEROL SULFATE 90 UG/1
2 AEROSOL, METERED RESPIRATORY (INHALATION) EVERY 4 HOURS PRN
Status: DISCONTINUED | OUTPATIENT
Start: 2022-06-21 | End: 2022-06-22 | Stop reason: HOSPADM

## 2022-06-21 RX ORDER — BUPROPION HYDROCHLORIDE 150 MG/1
300 TABLET ORAL EVERY MORNING
Status: DISCONTINUED | OUTPATIENT
Start: 2022-06-22 | End: 2022-06-22 | Stop reason: HOSPADM

## 2022-06-21 RX ORDER — ONDANSETRON 2 MG/ML
4 INJECTION INTRAMUSCULAR; INTRAVENOUS EVERY 6 HOURS PRN
Status: DISCONTINUED | OUTPATIENT
Start: 2022-06-21 | End: 2022-06-22 | Stop reason: HOSPADM

## 2022-06-21 RX ORDER — METOPROLOL SUCCINATE 25 MG/1
37.5 TABLET, EXTENDED RELEASE ORAL EVERY MORNING
Status: DISCONTINUED | OUTPATIENT
Start: 2022-06-22 | End: 2022-06-22 | Stop reason: HOSPADM

## 2022-06-21 RX ORDER — MUSCLE RUB CREAM 100; 150 MG/G; MG/G
CREAM TOPICAL 4 TIMES DAILY PRN
Status: DISCONTINUED | OUTPATIENT
Start: 2022-06-21 | End: 2022-06-22 | Stop reason: HOSPADM

## 2022-06-21 RX ORDER — MAGNESIUM HYDROXIDE/ALUMINUM HYDROXICE/SIMETHICONE 120; 1200; 1200 MG/30ML; MG/30ML; MG/30ML
30 SUSPENSION ORAL EVERY 6 HOURS PRN
Status: DISCONTINUED | OUTPATIENT
Start: 2022-06-21 | End: 2022-06-22 | Stop reason: HOSPADM

## 2022-06-21 RX ORDER — ENOXAPARIN SODIUM 100 MG/ML
40 INJECTION SUBCUTANEOUS DAILY
Status: DISCONTINUED | OUTPATIENT
Start: 2022-06-22 | End: 2022-06-22

## 2022-06-21 RX ORDER — PANTOPRAZOLE SODIUM 40 MG/10ML
40 INJECTION, POWDER, LYOPHILIZED, FOR SOLUTION INTRAVENOUS ONCE
Status: COMPLETED | OUTPATIENT
Start: 2022-06-21 | End: 2022-06-21

## 2022-06-21 RX ORDER — TADALAFIL 5 MG/1
5 TABLET ORAL DAILY
Status: DISCONTINUED | OUTPATIENT
Start: 2022-06-22 | End: 2022-06-22 | Stop reason: HOSPADM

## 2022-06-21 RX ORDER — LISINOPRIL 20 MG/1
20 TABLET ORAL 2 TIMES DAILY
Status: DISCONTINUED | OUTPATIENT
Start: 2022-06-21 | End: 2022-06-22 | Stop reason: HOSPADM

## 2022-06-21 RX ORDER — PRAVASTATIN SODIUM 80 MG/1
80 TABLET ORAL
Status: DISCONTINUED | OUTPATIENT
Start: 2022-06-22 | End: 2022-06-22 | Stop reason: HOSPADM

## 2022-06-21 RX ORDER — FLUTICASONE PROPIONATE 110 UG/1
1 AEROSOL, METERED RESPIRATORY (INHALATION) DAILY
Status: DISCONTINUED | OUTPATIENT
Start: 2022-06-22 | End: 2022-06-22 | Stop reason: HOSPADM

## 2022-06-21 RX ADMIN — PANTOPRAZOLE SODIUM 40 MG: 40 INJECTION, POWDER, FOR SOLUTION INTRAVENOUS at 16:59

## 2022-06-21 RX ADMIN — SODIUM CHLORIDE 8 MG/HR: 9 INJECTION, SOLUTION INTRAVENOUS at 19:36

## 2022-06-21 RX ADMIN — LISINOPRIL 20 MG: 10 TABLET ORAL at 22:12

## 2022-06-21 NOTE — TELEPHONE ENCOUNTER
Spoke with patient, he reports vomiting bright red blood this morning filling the toilet  Patient explains he has not ate anything red and not sure if it was blood  Patient denies lightheadedness/ dizziness, abdominal pain, fever nausea or anymore vomiting since this morning  Patient has not been seen by any [facility] GI providers in over 5 years  I advised patient he needs to go to the ED to be evaluated for vomiting blood   Patient agreeable and will be going to [facility]

## 2022-06-21 NOTE — TELEPHONE ENCOUNTER
Patient left message in regards to wanting to speak to Dr Lorne Greenwood on discussion for different face mask for his pap machine

## 2022-06-21 NOTE — TELEPHONE ENCOUNTER
Dr Caroline Goode called and said that he woke up this morning and was throwing up what he thinks is blood  He did not remember having anything red in his diet  I asked what this could be related to and he said he was not sure  It did not have to do with Hem Onc  He said that was under control  He says that he used to see Dr Phoenix Kessler in [place], I do not see this anywhere in his chart  There was a lot of static on his line so I just told him I would forward the message to the office for call back  We cannot triage him if hasn't been seen in the last year  Can someone please call him back to make sure this is the correct office he was looking for and possible schedule him  I am a MA but I cannot give out clinical advice over the phone in [place] [id number] but if he thinks its blood, and vomiting is abnormal, Maybe the ED or UC could help in the meantime?

## 2022-06-21 NOTE — ED PROVIDER NOTES
History  Chief Complaint   Patient presents with    Vomiting Blood     Pt to ER stating "I vomited blood this morning only one time" pt denies abdominal pain, and nausea  This 59-year-old man history of atrial fibrillation on no anticoagulant, CLL on immunotherapy, asthma and hypertension felt nauseous early this morning  He vomited fair amount of bright red blood without clots  He does not recall seeing any change in his stool lately  He denies chest pain, palpitations, dyspnea, syncope  He admits to taking a lot of Motrin over the last few weeks for arthralgias  He has had thrombocytopenia in the past due to chemotherapy  [place] [facility] [facility]  Patient states he has never had upper GI bleed or any GI bleeding  Prior to Admission Medications   Prescriptions Last Dose Informant Patient Reported? Taking? Acalabrutinib 100 MG CAPS  Self Yes No   Sig: Take 1 capsule by mouth 2 (two) times a day   Arnuity Ellipta 100 MCG/ACT AEPB inhaler   No No   Sig: Inhale 1 puff daily Rinse mouth after use     Cholecalciferol (Vitamin D3) 50 MCG (2000 UT) capsule  Self Yes No   Sig: Take 2,000 Units by mouth daily   Ibrutinib 140 MG CAPS  Self Yes No   Sig: Take by mouth every morning   Immune Globulin, Human, 30 GM/300ML SOLN  Self Yes No   Sig: Inject as directed Every 6 weeks-IV   Patient not taking: No sig reported   Multiple Vitamins-Minerals (PRESERVISION AREDS 2 PO)  Self Yes No   Sig: Take by mouth 2 (two) times a day   albuterol (ProAir HFA) 90 mcg/act inhaler   No No   Sig: Inhale 2 puffs every 4 (four) hours as needed for wheezing   amLODIPine (NORVASC) 10 mg tablet  Self Yes No   Sig: Take 10 mg by mouth every morning   Patient not taking: Reported on 1/10/2022    buPROPion (WELLBUTRIN XL) 300 mg 24 hr tablet  Self Yes No   Sig: Take 300 mg by mouth every morning   clindamycin (CLEOCIN) 150 mg capsule   Yes No   Sig: Take by mouth every 6 (six) hours   immune globulin, human (Bivigam) 5 GM/50ML  Self Yes No   Sig: 15mg IV every 8 weeks   lisinopril (ZESTRIL) 20 mg tablet  Self Yes No   Sig: Take 20 mg by mouth 2 (two) times a day   metoprolol succinate (TOPROL-XL) 25 mg 24 hr tablet  Self Yes No   Sig: Take 25 mg by mouth every morning Takes 1 1/12 a m     rosuvastatin (CRESTOR) 10 MG tablet  Self Yes No   Sig: Take 10 mg by mouth daily     tadalafil (CIALIS) 5 MG tablet  Self Yes No      Facility-Administered Medications: None       Past Medical History:   Diagnosis Date    Arthritis     BPH (benign prostatic hyperplasia)     Cancer (HCC)     Chronic lymphocytic leukemia (CLL), B-cell (HCC)     CPAP (continuous positive airway pressure) dependence     Depression     Hearing aid worn     bilateral    Hyperlipidemia     controlled    Hypertension     controlled    Sinusitis     Sleep apnea     CPAP    Tinnitus     Wears glasses        Past Surgical History:   Procedure Laterality Date    BACK SURGERY  09/2017    laminotomy L3,4 S1-    CARPAL TUNNEL RELEASE Bilateral     CATARACT EXTRACTION      left    CATARACT EXTRACTION W/ INTRAOCULAR LENS IMPLANT Left 12/11/2017    Procedure: EXTRACTION EXTRACAPSULAR CATARACT PHACO INTRAOCULAR LENS (IOL); Surgeon: Mamie Gastelum MD;  Location: [facility] MAIN OR;  Service: Ophthalmology    CERVICAL DISCECTOMY  1982    C5-6    COLONOSCOPY      HERNIA REPAIR      l inguinal    IR LOWER EXTREMITY / INTERVENTION  12/2/2019    [place] EXC SKIN MALIG 1 1-2 CM FACE,FACIAL Right 1/11/2022    Procedure: EXCISION SKIN LESION CHEEK WITH FROZEN SECTION;  Surgeon: Blayne Goddard MD;  Location: [facility] MAIN OR;  Service: Plastics    ROTATOR CUFF REPAIR Left 2006    TONSILLECTOMY         Family History   Problem Relation Age of Onset    Cancer Mother         bladder    Heart disease Father     Stroke Father     Parkinsonism Brother      I have reviewed and agree with the history as documented      E-Cigarette/Vaping    E-Cigarette Use Never User      E-Cigarette/Vaping Substances    Nicotine No     THC No     CBD No     Flavoring No     Other No     Unknown No      Social History     Tobacco Use    Smoking status: Former Smoker     Packs/day: 1 00     Years: 20 00     Pack years: 20 00     Quit date: 5     Years since quittin 4    Smokeless tobacco: Never Used   Vaping Use    Vaping Use: Never used   Substance Use Topics    Alcohol use: Yes     Alcohol/week: 7 0 standard drinks     Types: 7 Glasses of wine per week    Drug use: No       Review of Systems   Constitutional: Negative for fatigue, fever and unexpected weight change  Respiratory: Negative for cough and shortness of breath  Cardiovascular: Negative for chest pain, palpitations and leg swelling  Gastrointestinal: Positive for nausea and vomiting (Once today)  Negative for abdominal pain, blood in stool, constipation and diarrhea  Genitourinary: Negative for difficulty urinating, dysuria, flank pain and hematuria  Musculoskeletal: Negative for back pain  Physical Exam  Physical Exam  Vitals and nursing note reviewed  Constitutional:       General: He is not in acute distress  Appearance: He is well-developed and normal weight  He is not ill-appearing or diaphoretic  HENT:      Head: Normocephalic and atraumatic  Right Ear: External ear normal       Left Ear: External ear normal       Nose: Nose normal       Mouth/Throat:      Mouth: Mucous membranes are moist       Pharynx: Oropharynx is clear  Eyes:      General: No scleral icterus  Conjunctiva/sclera: Conjunctivae normal       Pupils: Pupils are equal, round, and reactive to light  Neck:      Vascular: No JVD  Cardiovascular:      Rate and Rhythm: Normal rate and regular rhythm  Pulses: Normal pulses  Heart sounds: Normal heart sounds  No murmur heard  Pulmonary:      Effort: Pulmonary effort is normal       Breath sounds: Normal breath sounds     Abdominal: General: Bowel sounds are normal       Palpations: Abdomen is soft  There is no mass  Tenderness: There is no abdominal tenderness  There is no guarding or rebound  Musculoskeletal:         General: No tenderness  Normal range of motion  Cervical back: Normal range of motion and neck supple  Right lower leg: No edema  Left lower leg: No edema  Lymphadenopathy:      Cervical: No cervical adenopathy  Skin:     General: Skin is warm and dry  Capillary Refill: Capillary refill takes less than 2 seconds  Findings: No bruising or rash  Neurological:      General: No focal deficit present  Mental Status: He is alert and oriented to person, place, and time  Mental status is at baseline  Cranial Nerves: No cranial nerve deficit  Sensory: No sensory deficit  Motor: No weakness  Coordination: Coordination normal       Deep Tendon Reflexes: Reflexes are normal and symmetric     Psychiatric:         Mood and Affect: Mood normal          Behavior: Behavior normal          Vital Signs  ED Triage Vitals   Temperature Pulse Respirations Blood Pressure SpO2   06/21/22 1537 06/21/22 1537 06/21/22 1537 06/21/22 1537 06/21/22 1537   98 4 °F (36 9 °C) 69 18 163/77 98 %      Temp Source Heart Rate Source Patient Position - Orthostatic VS BP Location FiO2 (%)   06/21/22 1537 06/21/22 1537 06/21/22 1537 06/21/22 1537 --   Oral Monitor Sitting Left arm       Pain Score       06/21/22 1912       No Pain           Vitals:    06/21/22 1537 06/21/22 1912   BP: 163/77 151/69   Pulse: 69 55   Patient Position - Orthostatic VS: Sitting Sitting         Visual Acuity      ED Medications  Medications   pantoprazole (PROTONIX) 80 mg in sodium chloride 0 9 % 100 mL infusion (8 mg/hr Intravenous New Bag 6/21/22 1936)   pantoprazole (PROTONIX) injection 40 mg (40 mg Intravenous Given 6/21/22 1659)       Diagnostic Studies  Results Reviewed     Procedure Component Value Units Date/Time Markos Ram [[id number]]  (Normal) Collected: 06/21/22 1912    Lab Status: Final result Specimen: Blood from Line, Venous Updated: 06/21/22 1938     Protime 13 0 seconds      INR 0 98    APTT [[id number]]  (Normal) Collected: 06/21/22 1912    Lab Status: Final result Specimen: Blood from Line, Venous Updated: 06/21/22 1938     PTT 24 seconds     Lipase [[id number]]  (Normal) Collected: 06/21/22 1545    Lab Status: Final result Specimen: Blood from Arm, Right Updated: 06/21/22 1619     Lipase 19 u/L     Comprehensive metabolic panel [[id number]]  (Abnormal) Collected: 06/21/22 1545    Lab Status: Final result Specimen: Blood from Arm, Right Updated: 06/21/22 1619     Sodium 135 mmol/L      Potassium 4 3 mmol/L      Chloride 100 mmol/L      CO2 28 mmol/L      ANION GAP 7 mmol/L      BUN 23 mg/dL      Creatinine 1 04 mg/dL      Glucose 164 mg/dL      Calcium 9 6 mg/dL      AST 19 U/L      ALT 20 U/L      Alkaline Phosphatase 47 U/L      Total Protein 6 9 g/dL      Albumin 4 3 g/dL      Total Bilirubin 0 82 mg/dL      eGFR 67 ml/min/1 73sq m     Narrative:      [place] guidelines for Chronic Kidney Disease (CKD):     Stage 1 with normal or high GFR (GFR > 90 mL/min/1 73 square meters)    Stage 2 Mild CKD (GFR = 60-89 mL/min/1 73 square meters)    Stage 3A Moderate CKD (GFR = 45-59 mL/min/1 73 square meters)    Stage 3B Moderate CKD (GFR = 30-44 mL/min/1 73 square meters)    Stage 4 Severe CKD (GFR = 15-29 mL/min/1 73 square meters)    Stage 5 End Stage CKD (GFR <15 mL/min/1 73 square meters)  Note: GFR calculation is accurate only with a steady state creatinine    CBC and differential [[id number]] Collected: 06/21/22 1545    Lab Status: Final result Specimen: Blood from Arm, Right Updated: 06/21/22 1608     WBC 7 22 Thousand/uL      RBC 4 14 Million/uL      Hemoglobin 13 2 g/dL      Hematocrit 39 8 %      MCV 96 fL      MCH 31 9 pg      MCHC 33 2 g/dL      RDW 13 2 %      MPV 11 3 fL Platelets 728 Thousands/uL      nRBC 0 /100 WBCs      Neutrophils Relative 72 %      Immat GRANS % 0 %      Lymphocytes Relative 14 %      Monocytes Relative 12 %      Eosinophils Relative 2 %      Basophils Relative 0 %      Neutrophils Absolute 5 16 Thousands/µL      Immature Grans Absolute 0 03 Thousand/uL      Lymphocytes Absolute 1 03 Thousands/µL      Monocytes Absolute 0 88 Thousand/µL      Eosinophils Absolute 0 11 Thousand/µL      Basophils Absolute 0 01 Thousands/µL                  No orders to display              Procedures  ECG 12 Lead Documentation Only    Date/Time: 6/21/2022 7:29 PM  Performed by: Pradeep Ashraf DO  Authorized by: Pradeep Ashraf DO     ECG reviewed by me, the ED Provider: yes    Patient location:  ED  Previous ECG:     Previous ECG:  Compared to current    Comparison ECG info:  PACs    Similarity:  Changes noted    Comparison to cardiac monitor: Yes    Rhythm:     Rhythm: sinus rhythm    Ectopy:     Ectopy: PAC    QRS:     QRS axis:  Normal    QRS intervals:  Normal  Conduction:     Conduction: normal               ED Course  ED Course as of 06/21/22 1941 Tue Jun 21, 2022   1747 Patient remains stable here  He has no complaints  I paged GI to discuss possible evaluation prior to his trip tomorrow                               SBIRT 22yo+    Vj Timbo Most Recent Value   SBIRT (25 yo +)    In order to provide better care to our patients, we are screening all of our patients for alcohol and drug use  Would it be okay to ask you these screening questions? Unable to answer at this time Filed at: 06/21/2022 1613                    MDM  Number of Diagnoses or Management Options  Hematemesis with nausea: new and requires workup  Diagnosis management comments: Elderly male with CLL and immunotherapy had painless hematemesis today  He is hemodynamically stable  CBC is within normal range including platelet count of 192724  Remaining labs unremarkable    No further vomiting here   Discussed with GI and with slim  Will be admitted with on Protonix infusion with plan for upper endoscopy tomorrow  Amount and/or Complexity of Data Reviewed  Clinical lab tests: ordered and reviewed  Review and summarize past medical records: yes  Discuss the patient with other providers: yes        Disposition  Final diagnoses:   Hematemesis with nausea     Time reflects when diagnosis was documented in both MDM as applicable and the Disposition within this note     Time User Action Codes Description Comment    6/21/2022  6:32 PM Daly Elizabeth Add [K92 0] Hematemesis with nausea       ED Disposition     ED Disposition   Admit    Condition   Stable    Date/Time   Tue Jun 21, 2022  6:59 PM    Comment   Case was discussed with Dr Amado Alcantar and the patient's admission status was agreed to be Admission Status: observation status to the service of Dr Amado Alcantar   Follow-up Information    None         Patient's Medications   Discharge Prescriptions    No medications on file       No discharge procedures on file      PDMP Review     None          ED Provider  Electronically Signed by           Digna Urbina DO  06/21/22 1949

## 2022-06-22 ENCOUNTER — APPOINTMENT (OUTPATIENT)
Dept: GASTROENTEROLOGY | Facility: HOSPITAL | Age: 82
End: 2022-06-22
Payer: MEDICARE

## 2022-06-22 ENCOUNTER — ANESTHESIA (OUTPATIENT)
Dept: GASTROENTEROLOGY | Facility: HOSPITAL | Age: 82
End: 2022-06-22
Payer: MEDICARE

## 2022-06-22 ENCOUNTER — ANESTHESIA EVENT (OUTPATIENT)
Dept: GASTROENTEROLOGY | Facility: HOSPITAL | Age: 82
End: 2022-06-22
Payer: MEDICARE

## 2022-06-22 VITALS
OXYGEN SATURATION: 96 % | TEMPERATURE: 97.6 F | HEART RATE: 61 BPM | DIASTOLIC BLOOD PRESSURE: 78 MMHG | BODY MASS INDEX: 23.82 KG/M2 | SYSTOLIC BLOOD PRESSURE: 145 MMHG | WEIGHT: 166 LBS | RESPIRATION RATE: 18 BRPM

## 2022-06-22 PROBLEM — K29.61 OTHER GASTRITIS WITH BLEEDING: Status: ACTIVE | Noted: 2022-06-22

## 2022-06-22 PROBLEM — Z99.89 CPAP (CONTINUOUS POSITIVE AIRWAY PRESSURE) DEPENDENCE: Status: ACTIVE | Noted: 2022-06-22

## 2022-06-22 PROBLEM — K31.7 GASTRIC POLYP: Status: ACTIVE | Noted: 2022-06-22

## 2022-06-22 LAB
ALBUMIN SERPL BCP-MCNC: 4 G/DL (ref 3.5–5)
ALP SERPL-CCNC: 45 U/L (ref 34–104)
ALT SERPL W P-5'-P-CCNC: 17 U/L (ref 7–52)
ANION GAP SERPL CALCULATED.3IONS-SCNC: 6 MMOL/L (ref 4–13)
AST SERPL W P-5'-P-CCNC: 17 U/L (ref 13–39)
BILIRUB SERPL-MCNC: 0.88 MG/DL (ref 0.2–1)
BUN SERPL-MCNC: 16 MG/DL (ref 5–25)
CALCIUM SERPL-MCNC: 8.9 MG/DL (ref 8.4–10.2)
CHLORIDE SERPL-SCNC: 103 MMOL/L (ref 96–108)
CO2 SERPL-SCNC: 28 MMOL/L (ref 21–32)
CREAT SERPL-MCNC: 0.95 MG/DL (ref 0.6–1.3)
ERYTHROCYTE [DISTWIDTH] IN BLOOD BY AUTOMATED COUNT: 13.1 % (ref 11.6–15.1)
GFR SERPL CREATININE-BSD FRML MDRD: 75 ML/MIN/1.73SQ M
GLUCOSE SERPL-MCNC: 99 MG/DL (ref 65–140)
HCT VFR BLD AUTO: 40.1 % (ref 36.5–49.3)
HGB BLD-MCNC: 13.2 G/DL (ref 12–17)
MCH RBC QN AUTO: 31.9 PG (ref 26.8–34.3)
MCHC RBC AUTO-ENTMCNC: 32.9 G/DL (ref 31.4–37.4)
MCV RBC AUTO: 97 FL (ref 82–98)
PLATELET # BLD AUTO: 142 THOUSANDS/UL (ref 149–390)
PMV BLD AUTO: 10.8 FL (ref 8.9–12.7)
POTASSIUM SERPL-SCNC: 4 MMOL/L (ref 3.5–5.3)
PROT SERPL-MCNC: 6.4 G/DL (ref 6.4–8.4)
RBC # BLD AUTO: 4.14 MILLION/UL (ref 3.88–5.62)
SODIUM SERPL-SCNC: 137 MMOL/L (ref 135–147)
WBC # BLD AUTO: 5.6 THOUSAND/UL (ref 4.31–10.16)

## 2022-06-22 PROCEDURE — 43239 EGD BIOPSY SINGLE/MULTIPLE: CPT | Performed by: INTERNAL MEDICINE

## 2022-06-22 PROCEDURE — 99284 EMERGENCY DEPT VISIT MOD MDM: CPT | Performed by: INTERNAL MEDICINE

## 2022-06-22 PROCEDURE — 36415 COLL VENOUS BLD VENIPUNCTURE: CPT

## 2022-06-22 PROCEDURE — 43251 EGD REMOVE LESION SNARE: CPT | Performed by: INTERNAL MEDICINE

## 2022-06-22 PROCEDURE — 88305 TISSUE EXAM BY PATHOLOGIST: CPT | Performed by: PATHOLOGY

## 2022-06-22 PROCEDURE — 80053 COMPREHEN METABOLIC PANEL: CPT

## 2022-06-22 PROCEDURE — 85027 COMPLETE CBC AUTOMATED: CPT

## 2022-06-22 PROCEDURE — C9113 INJ PANTOPRAZOLE SODIUM, VIA: HCPCS | Performed by: EMERGENCY MEDICINE

## 2022-06-22 RX ORDER — GLYCOPYRROLATE 0.2 MG/ML
INJECTION INTRAMUSCULAR; INTRAVENOUS AS NEEDED
Status: DISCONTINUED | OUTPATIENT
Start: 2022-06-22 | End: 2022-06-22

## 2022-06-22 RX ORDER — ACETAMINOPHEN 325 MG/1
975 TABLET ORAL EVERY 6 HOURS PRN
Status: DISCONTINUED | OUTPATIENT
Start: 2022-06-22 | End: 2022-06-22 | Stop reason: HOSPADM

## 2022-06-22 RX ORDER — SODIUM CHLORIDE, SODIUM LACTATE, POTASSIUM CHLORIDE, CALCIUM CHLORIDE 600; 310; 30; 20 MG/100ML; MG/100ML; MG/100ML; MG/100ML
INJECTION, SOLUTION INTRAVENOUS CONTINUOUS PRN
Status: DISCONTINUED | OUTPATIENT
Start: 2022-06-22 | End: 2022-06-22

## 2022-06-22 RX ORDER — LIDOCAINE HYDROCHLORIDE 10 MG/ML
INJECTION, SOLUTION EPIDURAL; INFILTRATION; INTRACAUDAL; PERINEURAL AS NEEDED
Status: DISCONTINUED | OUTPATIENT
Start: 2022-06-22 | End: 2022-06-22

## 2022-06-22 RX ORDER — PANTOPRAZOLE SODIUM 40 MG/10ML
40 INJECTION, POWDER, LYOPHILIZED, FOR SOLUTION INTRAVENOUS EVERY 12 HOURS
Status: DISCONTINUED | OUTPATIENT
Start: 2022-06-22 | End: 2022-06-22 | Stop reason: HOSPADM

## 2022-06-22 RX ORDER — PROPOFOL 10 MG/ML
INJECTION, EMULSION INTRAVENOUS AS NEEDED
Status: DISCONTINUED | OUTPATIENT
Start: 2022-06-22 | End: 2022-06-22

## 2022-06-22 RX ORDER — PANTOPRAZOLE SODIUM 40 MG/1
40 TABLET, DELAYED RELEASE ORAL DAILY
Qty: 30 TABLET | Refills: 1 | Status: SHIPPED | OUTPATIENT
Start: 2022-06-22

## 2022-06-22 RX ADMIN — SODIUM CHLORIDE 8 MG/HR: 9 INJECTION, SOLUTION INTRAVENOUS at 05:05

## 2022-06-22 RX ADMIN — METOPROLOL SUCCINATE 37.5 MG: 25 TABLET, FILM COATED, EXTENDED RELEASE ORAL at 10:26

## 2022-06-22 RX ADMIN — PROPOFOL 80 MG: 10 INJECTION, EMULSION INTRAVENOUS at 13:51

## 2022-06-22 RX ADMIN — GLYCOPYRROLATE 0.2 MG: 0.2 INJECTION INTRAMUSCULAR; INTRAVENOUS at 13:48

## 2022-06-22 RX ADMIN — LIDOCAINE HYDROCHLORIDE 80 MG: 10 INJECTION, SOLUTION EPIDURAL; INFILTRATION; INTRACAUDAL; PERINEURAL at 13:51

## 2022-06-22 RX ADMIN — PROPOFOL 40 MG: 10 INJECTION, EMULSION INTRAVENOUS at 13:53

## 2022-06-22 RX ADMIN — SODIUM CHLORIDE, SODIUM LACTATE, POTASSIUM CHLORIDE, AND CALCIUM CHLORIDE: .6; .31; .03; .02 INJECTION, SOLUTION INTRAVENOUS at 13:42

## 2022-06-22 RX ADMIN — PROPOFOL 40 MG: 10 INJECTION, EMULSION INTRAVENOUS at 13:56

## 2022-06-22 RX ADMIN — ACETAMINOPHEN 975 MG: 325 TABLET, FILM COATED ORAL at 11:02

## 2022-06-22 RX ADMIN — FLUTICASONE PROPIONATE 1 PUFF: 110 AEROSOL, METERED RESPIRATORY (INHALATION) at 10:28

## 2022-06-22 RX ADMIN — LISINOPRIL 20 MG: 10 TABLET ORAL at 10:27

## 2022-06-22 RX ADMIN — BUPROPION HYDROCHLORIDE 300 MG: 150 TABLET, FILM COATED, EXTENDED RELEASE ORAL at 10:25

## 2022-06-22 RX ADMIN — MULTIPLE VITAMINS W/ MINERALS TAB 1 TABLET: TAB ORAL at 10:25

## 2022-06-22 NOTE — ANESTHESIA POSTPROCEDURE EVALUATION
Post-Op Assessment Note    CV Status:  Stable    Pain management: adequate     Mental Status:  Alert, awake, arousable and sleepy   Hydration Status:  Euvolemic   PONV Controlled:  Controlled   Airway Patency:  Patent      Post Op Vitals Reviewed: Yes      Staff: CRNA         No complications documented      BP   99/72   Temp      Pulse  60   Resp   16   SpO2   99

## 2022-06-22 NOTE — TELEPHONE ENCOUNTER
Lm for pt to [facility] us so I can see if he received new mask Dr Jd Navarrete discussed as per last ov   Pt is currently admitted

## 2022-06-22 NOTE — ED NOTES
Patient resting between care, patient reporting mild headache, provider notified and PRN tylenol given per [facility]  Patient aware of plan of care for EGD later, NPO except for sips with meds  Patient in no distress, denies nausea or vomiting  Room arranged for comfort, stretcher locked in lowest position        Estrella Villarreal RN  06/22/22 8355

## 2022-06-22 NOTE — DISCHARGE SUMMARY
[facility]  Discharge- Sejal Ram 1940, 80 y o  male MRN: [id number]  Unit/Bed#: W -01 Encounter: [id number]  Primary Care Provider: Lanre Higuera MD   Date and time admitted to hospital: 6/21/2022  4:08 PM    * Hematemesis  Assessment & Plan  · CC: bloating, nausea, then bloody emesis x1 yesterday AM  · No hx of this in the past, has not had any subsequent episodes  · Has been taking 2 tablets motrin BID for 2 months for arthralgias  · No change in stools- no melena or brbpr  · Hx of CLL and thrombocytopenia-  platelets 062 on arrival, dropped to 142 today (1 day PTA)  · PT/INR, aPTT within normal limits  · Hgb and RBC counts stable  · His last colonoscopy was in 2020 showed small internal hemorrhoids, mild sigmoid diverticulosis, and 2 sessile rectal polyps, as well as some healing ulcers in the ascending colon that were deemed due to ischemic colitis  · EGD: Linear erythema noted in the antrum of the stomach  Biopsies for H  Pylori collected  A 6-7 mm adenomatous appearing polypoid lesion was removed with snare cautery  Plan:  · Pantoprazole 40 mg daily   · Avoid NSAIDs      History of atrial fibrillation  Assessment & Plan  Was related to ibrutinib, he is no longer on this medication  He does have PVCs, but in NSR  Not on anticoagulation given that he does not have Afib any longer and his history of thrombocytopenia  Hypertension  Assessment & Plan  On lisinopril 20mg and metoprolol 37 5mg-- continue    CLL (chronic lymphocytic leukemia) ([facility] [address] )  Assessment & Plan  Patient takes acalabrutinib (1 capsule BID) and receives IVIG infusions every 2 months  He has a history of thrombocytopenia as well secondary to the immunotherapy      Medical Problems             Resolved Problems  Date Reviewed: 6/21/2022   None               Discharging Resident: Lia Rajput MD  Discharging Attending: Hayden Will MD  PCP: Lanre Higuera MD  Admission Date:   Admission Orders (From admission, onward)     Ordered        06/21/22 1900  Place in Observation  Once                      Discharge Date: 06/22/22    Consultations During Hospital Stay:  · Gastroenterology    Procedures Performed:   · EGD    Significant Findings / Test Results:   · Linear erythema noted in the antrum of the stomach  Biopsies for H  Pylori collected  A 6-7 mm adenomatous appearing polypoid lesion was removed with snare cautery  Incidental Findings:   · N/A    Test Results Pending at Discharge (will require follow up): · Cytology of resected benign appearing lesion as noted above     Outpatient Tests Requested:  · N/A     Complications:  None    Reason for Admission: Hematemesis    Hospital Course:   Nina Pittman is a 80 y o  male patient who originally presented to the hospital on 6/21/2022 following one episode of hematemesis  On arrival in the ED, coagulation studies, CBC, CMP, and lipase were all within normal limits and EKG showed NSR with some PACs  Patient was started on a Protonix infusion and admitted for EGD the following day  EGD showed erythema in the antrum of the stomach and a 6-7 mm adenomatous appearing polypoid lesion which was resected and sent for cytology  Vital signs and Hgb remained stable and the patient had no further episodes of hematemesis, so he was discharged pm Protonix 40 mg daily with planned GI and PCP follow up  Please see above list of diagnoses and related plan for additional information  Condition at Discharge: good    Discharge Day Visit / Exam:   Subjective:  Patient reports feeling well today  He denies any additional episodes of hematemesis  No abdominal pain, melena, or hematochezia  He states that following his one episode of hematemesis yesterday he has felt unremarkable    Vitals: Blood Pressure: 145/78 (06/22/22 1429)  Pulse: 61 (06/22/22 1429)  Temperature: 97 6 °F (36 4 °C) (06/22/22 1402)  Temp Source: Temporal (06/22/22 1402)  Respirations: 18 (06/22/22 1429)  Weight - Scale: 75 3 kg (166 lb) (06/21/22 1912)  SpO2: 96 % (06/22/22 1429)  Exam:   Physical Exam  Vitals and nursing note reviewed  Constitutional:       Appearance: He is well-developed  HENT:      Head: Normocephalic and atraumatic  Cardiovascular:      Rate and Rhythm: Normal rate and regular rhythm  Heart sounds: No murmur heard  Pulmonary:      Effort: Pulmonary effort is normal  No respiratory distress  Breath sounds: Normal breath sounds  Abdominal:      Palpations: Abdomen is soft  Tenderness: There is no abdominal tenderness  Skin:     General: Skin is warm and dry  Findings: Bruising present  Comments: Bruises noted on bilateral upper extremities  Patient states that this is his baseline 2/2 his immunotherapy  Neurological:      Mental Status: He is alert  Discussion with Family: Patient declined call to   Discharge instructions/Information to patient and family:   See after visit summary for information provided to patient and family  Provisions for Follow-Up Care:  See after visit summary for information related to follow-up care and any pertinent home health orders  Disposition:   Home    Planned Readmission: N/A    Discharge Medications:  See after visit summary for reconciled discharge medications provided to patient and/or family        **Please Note: This note may have been constructed using a voice recognition system**

## 2022-06-22 NOTE — H&P
[facility]  H&P- Enriqueta Box 1940, 80 y o  male MRN: [id number]  Unit/Bed#: ED 05 Encounter: [id number]  Primary Care Provider: Ronan Bahena MD   Date and time admitted to hospital: 6/21/2022  4:08 PM    * Hematemesis  Assessment & Plan  · CC: bloating, nausea, then bloody emesis x1 this AM  · No hx of this in the past, has not had any subsequent episodes  · Has been taking 2 tablets motrin BID for 2 months for arthralgias  · No change in stools- no melena or brbpr  · Hx of CLL and thrombocytopenia-  platelets currently 186  · INR within normal limits  · His last colonoscopy was in 2020 showed small internal hemorrhoids, mild sigmoid diverticulosis, and 2 sessile rectal polyps, as well as some healing ulcers in the ascending colon that were deemed due to ischemic colitis  Plan:  · Pantoprazole infusion  · GI consult  · Avoid NSAIDs  · Bengay for arthralgias  · If not effective, can use lidocaine patch/cream    History of atrial fibrillation  Assessment & Plan  Was related to ibrutinib, he is no longer on this medication  He does have PVCs, but in NSR  Not on anticoagulation given that he does not have Afib any longer and his history of thrombocytopenia  Hypertension  Assessment & Plan  On lisinopril 20mg and metoprolol 37 5mg-- continue    CLL (chronic lymphocytic leukemia) (HCC)  Assessment & Plan  On acalabrutinib and receives IVIG infusions  Has history of thrombocytopenia as well  VTE Pharmacologic Prophylaxis: VTE Score: 4 Moderate Risk (Score 3-4) - Pharmacological DVT Prophylaxis Ordered: enoxaparin (Lovenox)  Code Status: Level 1 - Full Code   Discussion with family: Patient declined call to   Anticipated Length of Stay: Patient will be admitted on an observation basis with an anticipated length of stay of less than 2 midnights secondary to GI evaluation  Chief Complaint: hematemesis    History of Present Illness:  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  He has a past medical history of CLL on immunotherapy, STEFANY with CPAP, BPH, HLD, HTN, depression  This morning, he began feeling bloated and nauseous and vomited bloody emesis  Denies eating or drinking anything red colored  He denies any melanic nor bright red stool  Endorses feeling well prior to this AM, and even after vomiting feels well and has no return of nausea/vomiting  He reports that he has been taking 2 tablets of ibuprofen BID for the past 2 months due to arthralgias  He has never had anything like this happen before  In the ED, vitals are stable, and all labs are within normal limits  Platelet count is 655  GI would like him admitted for scope tomorrow  Currently on Protonix drip  Review of Systems:  Review of Systems   Constitutional: Negative for chills and fever  Eyes: Negative for visual disturbance  Respiratory: Negative for cough and shortness of breath  Cardiovascular: Negative for chest pain and palpitations  Gastrointestinal: Negative for abdominal distention, abdominal pain, anal bleeding, blood in stool, constipation, nausea and vomiting (resolved)  Genitourinary: Negative for dysuria, enuresis, frequency and hematuria  Musculoskeletal: Positive for arthralgias  Negative for back pain  Skin: Negative for color change and rash  Neurological: Positive for light-headedness (orthostatic)  Negative for dizziness, seizures, syncope and weakness  All other systems reviewed and are negative        Past Medical and Surgical History:   Past Medical History:   Diagnosis Date    Arthritis     BPH (benign prostatic hyperplasia)     Cancer (HCC)     Chronic lymphocytic leukemia (CLL), B-cell (HCC)     CPAP (continuous positive airway pressure) dependence     Depression     Hearing aid worn     bilateral    Hyperlipidemia     controlled    Hypertension     controlled    Sinusitis     Sleep apnea     CPAP    Tinnitus     Wears glasses        Past Surgical History:   Procedure Laterality Date    BACK SURGERY  09/2017    laminotomy L3,4 S1-    CARPAL TUNNEL RELEASE Bilateral     CATARACT EXTRACTION      left    CATARACT EXTRACTION W/ INTRAOCULAR LENS IMPLANT Left 12/11/2017    Procedure: EXTRACTION EXTRACAPSULAR CATARACT PHACO INTRAOCULAR LENS (IOL); Surgeon: Carina Johnson MD;  Location: [facility] MAIN OR;  Service: Ophthalmology    CERVICAL DISCECTOMY  1982    C5-6    COLONOSCOPY      HERNIA REPAIR      l inguinal    IR LOWER EXTREMITY / INTERVENTION  12/2/2019    DC EXC SKIN MALIG 1 1-2 CM FACE,FACIAL Right 1/11/2022    Procedure: EXCISION SKIN LESION CHEEK WITH FROZEN SECTION;  Surgeon: Lonnie Jerome MD;  Location: [address] [place] [address];  Service: Plastics    ROTATOR CUFF REPAIR Left 2006    TONSILLECTOMY         Meds/Allergies:  Prior to Admission medications    Medication Sig Start Date End Date Taking? Authorizing Provider   Acalabrutinib 100 MG CAPS Take 1 capsule by mouth 2 (two) times a day    Historical Provider, MD   albuterol (ProAir HFA) 90 mcg/act inhaler Inhale 2 puffs every 4 (four) hours as needed for wheezing 6/7/22   Martin Lane, DO   Arnuity Ellipta 100 MCG/ACT AEPB inhaler Inhale 1 puff daily Rinse mouth after use  6/7/22 7/7/22  Martin Even, DO   buPROPion (WELLBUTRIN XL) 300 mg 24 hr tablet Take 300 mg by mouth every morning    Historical Provider, MD   immune globulin, human (Bivigam) 5 GM/50ML 15mg IV every 8 weeks 5/17/21   Historical Provider, MD   lisinopril (ZESTRIL) 20 mg tablet Take 20 mg by mouth 2 (two) times a day    Historical Provider, MD   metoprolol succinate (TOPROL-XL) 25 mg 24 hr tablet Take 25 mg by mouth every morning Takes 1 1/12 a m       Historical Provider, MD   Multiple Vitamins-Minerals (PRESERVISION AREDS 2 PO) Take by mouth 2 (two) times a day    Historical Provider, MD   rosuvastatin (CRESTOR) 10 MG tablet Take 10 mg by mouth daily Historical Provider, MD   tadalafil (CIALIS) 5 MG tablet  19   Historical Provider, MD   amLODIPine (NORVASC) 10 mg tablet Take 10 mg by mouth every morning  Patient not taking: Reported on 1/10/2022   6/21/22  Historical Provider, MD   Cholecalciferol (Vitamin D3) 50 MCG ( UT) capsule Take 2,000 Units by mouth daily  22  Historical Provider, MD   clindamycin (CLEOCIN) 150 mg capsule Take by mouth every 6 (six) hours  22  Historical Provider, MD   Ibrutinib 140 MG CAPS Take by mouth every morning  22  Historical Provider, MD   Immune Globulin, Human, 30 GM/300ML SOLN Inject as directed Every 6 weeks-IV  Patient not taking: No sig reported  22  Historical Provider, MD     I have reviewed home medications with patient personally  Allergies:    Allergies   Allergen Reactions    Rocephin [Ceftriaxone]      Avoids d/t previous liver toxicity       Social History:  Marital Status: /Civil Union   Occupation: retired urologist  Patient Pre-hospital Living Situation: Home, With spouse  Patient Pre-hospital Level of Mobility: walks  Patient Pre-hospital Diet Restrictions: none  Substance Use History:   Social History     Substance and Sexual Activity   Alcohol Use Yes    Alcohol/week: 7 0 standard drinks    Types: 7 Glasses of wine per week     Social History     Tobacco Use   Smoking Status Former Smoker    Packs/day: 1 00    Years: 20 00    Pack years: 20 00    Quit date: 5    Years since quittin 4   Smokeless Tobacco Never Used     Social History     Substance and Sexual Activity   Drug Use No       Family History:  Family History   Problem Relation Age of Onset    Cancer Mother         bladder    Heart disease Father     Stroke Father     Parkinsonism Brother        Physical Exam:     Vitals:   Blood Pressure: 151/69 (22)  Pulse: 55 (22)  Temperature: 98 4 °F (36 9 °C) (22 1537)  Temp Source: Oral (22 153)  Respirations: 18 (06/21/22 1912)  Weight - Scale: 75 3 kg (166 lb) (06/21/22 1912)  SpO2: 97 % (06/21/22 1912)    Physical Exam  Vitals and nursing note reviewed  Constitutional:       General: He is not in acute distress  HENT:      Head: Normocephalic and atraumatic  Mouth/Throat:      Mouth: Mucous membranes are moist       Pharynx: Oropharynx is clear  Eyes:      Extraocular Movements: Extraocular movements intact  Conjunctiva/sclera: Conjunctivae normal       Pupils: Pupils are equal, round, and reactive to light  Cardiovascular:      Rate and Rhythm: Normal rate and regular rhythm  Pulses: Normal pulses  Heart sounds: Normal heart sounds  No murmur heard  Pulmonary:      Effort: Pulmonary effort is normal  No respiratory distress  Breath sounds: Normal breath sounds  Abdominal:      General: Abdomen is flat  Bowel sounds are normal  There is no distension  Palpations: Abdomen is soft  There is no mass  Tenderness: There is no abdominal tenderness  There is no guarding  Hernia: No hernia is present  Musculoskeletal:         General: No swelling or tenderness  Cervical back: Neck supple  Skin:     General: Skin is warm and dry  Neurological:      Mental Status: He is alert and oriented to person, place, and time            Additional Data:     Lab Results:  Results from last 7 days   Lab Units 06/21/22  1545   WBC Thousand/uL 7 22   HEMOGLOBIN g/dL 13 2   HEMATOCRIT % 39 8   PLATELETS Thousands/uL 162   NEUTROS PCT % 72   LYMPHS PCT % 14   MONOS PCT % 12   EOS PCT % 2     Results from last 7 days   Lab Units 06/21/22  1545   SODIUM mmol/L 135   POTASSIUM mmol/L 4 3   CHLORIDE mmol/L 100   CO2 mmol/L 28   BUN mg/dL 23   CREATININE mg/dL 1 04   ANION GAP mmol/L 7   CALCIUM mg/dL 9 6   ALBUMIN g/dL 4 3   TOTAL BILIRUBIN mg/dL 0 82   ALK PHOS U/L 47   ALT U/L 20   AST U/L 19   GLUCOSE RANDOM mg/dL 164*     Results from last 7 days   Lab Units 06/21/22 1912   INR  0 98 Imaging: No pertinent imaging reviewed  No orders to display       EKG and Other Studies Reviewed on Admission:   · EKG: NSR with PVC  ** Please Note: This note has been constructed using a voice recognition system   **

## 2022-06-22 NOTE — DISCHARGE INSTR - AVS FIRST PAGE
Dear Tamiko Padron,     It was our pleasure to care for you here at [facility]  It is our hope that we were always able to exceed the expected standards for your care during your stay  You were hospitalized due to hematemesis  You were cared for by Jay Dunn MD under the service of Wing Lee MD with the [facility] who covers for your primary care physician (PCP), Hyun Larson MD, while you were hospitalized  If you have any questions or concerns related to this hospitalization, you may contact us at [phone]  For follow up as well as any medication refills, we recommend that you follow up with your primary care physician  A registered nurse will reach out to you by phone within a few days after your discharge to answer any additional questions that you may have after going home  However, at this time we provide for you here, the most important instructions / recommendations at discharge:     Notable Medication Adjustments -   Please begin taking Protonix 40 mg once daily   Please discontinue NSAIDs   Testing Required after Discharge -   N/A   Important follow up information -   Please follow-up with primary care provider within 1 week of hospital discharge  Please follow-up with Gastroenterology as indicated  Other Instructions -   N/A   Please review this entire after visit summary as additional general instructions including medication list, appointments, activity, diet, any pertinent wound care, and other additional recommendations from your care team that may be provided for you        Sincerely,     Jay Dunn MD

## 2022-06-22 NOTE — ANESTHESIA PREPROCEDURE EVALUATION
Procedure:  EGD    One episode of hematemesis, non x 24hrs, no n/v, H/H stable, approp NPO    Relevant Problems   CARDIO   (+) Hyperlipidemia   (+) Hypertension      GI/HEPATIC   (+) Hematemesis      PULMONARY   (+) Mild intermittent asthma without complication   (+) Obstructive sleep apnea      Other   (+) CLL (chronic lymphocytic leukemia) (HCC)   (+) CPAP (continuous positive airway pressure) dependence        Physical Exam    Airway    Mallampati score: I  TM Distance: >3 FB  Neck ROM: full     Dental   No notable dental hx     Cardiovascular      Pulmonary      Other Findings        Anesthesia Plan  ASA Score- 3     Anesthesia Type- IV sedation with anesthesia with ASA Monitors  Additional Monitors:   Airway Plan:           Plan Factors-Exercise tolerance (METS): >4 METS  Chart reviewed  Existing labs reviewed  Patient summary reviewed  Induction- intravenous  Postoperative Plan-     Informed Consent- Anesthetic plan and risks discussed with patient and spouse  I personally reviewed this patient with the CRNA  Discussed and agreed on the Anesthesia Plan with the CRNA  Kym Trinh

## 2022-06-22 NOTE — CONSULTS
Consultation - [facility]) [facility]  Sai Nova 80 y o  male MRN: [id number]  Unit/Bed#: ED 05 Encounter: [id number]        Inpatient consult to gastroenterology  Consult performed by: Ghazala Hall PA-C  Consult ordered by: Jaret moreland DO          Reason for Consult / Principal Problem:  Hematemesis    ASSESSMENT and PLAN:    Principal Problem:    Hematemesis  Active Problems:    CLL (chronic lymphocytic leukemia) ([address] )    Hypertension    History of atrial fibrillation    #1  Hematemesis:  In the setting of NSAID use  Suspect possible peptic ulcer disease  One episode of vomiting blood yesterday morning  No bowel movement or melena since  No further vomiting  Denies any significant abdominal pain  Fortunately BUN and hemoglobin are stable  -NPO  -PPI drip  -avoid NSAID  -plan for upper endoscopy today to further assess  Discussed the procedure including risks and benefits in detail with the patient      -------------------------------------------------------------------------------------------------------------------    HPI:  This is a 72-year-old male with a history of atrial fibrillation on no anticoagulation, CLL on immunotherapy daily, asthma, and hypertension who presented to the hospital yesterday secondary to 1 episode of hematemesis  The patient reports that yesterday morning he woke up and felt a bit nauseous  He had some discomfort in his abdomen  He subsequently had 1 episode of vomiting that was bright red  Denies any significant clots or black color in the vomit  He has not had any further vomiting since  He reports his last solid meal was the day prior to yesterday  Denies any anticoagulation  He does admit to taking ibuprofen twice daily for the last 2 months for arthralgias  Denies heartburn, trouble swallowing  Has never had vomiting of blood in the past   Denies ever having upper endoscopy    Denies having any melena at home and has not had a bowel movements is episode of vomiting  His last colonoscopy was in 2020 with some internal hemorrhoids, diverticulosis, and 2 polyps as well as some healing ulcers in the ascending colon that were thought to be due to ischemic colitis  Denies any unexplained weight loss  Patient is retired urologist   He is scheduled to be leaving tomorrow for vacation    REVIEW OF SYSTEMS:    CONSTITUTIONAL: Denies any fever, chills, or rigors  Good appetite, and no recent weight loss  HEENT: No earache or tinnitus  Denies hearing loss or visual disturbances  CARDIOVASCULAR: No chest pain or palpitations  RESPIRATORY: Denies any cough, hemoptysis, shortness of breath or dyspnea on exertion  GASTROINTESTINAL: As noted in the History of Present Illness  GENITOURINARY: No problems with urination  Denies any hematuria or dysuria  NEUROLOGIC: No dizziness or vertigo, denies headaches  MUSCULOSKELETAL: Denies any muscle pain, +joint pain   SKIN: Denies skin rashes or itching  ENDOCRINE: Denies excessive thirst  Denies intolerance to heat or cold  PSYCHOSOCIAL: Denies depression or anxiety  Denies any recent memory loss  Historical Information   Past Medical History:   Diagnosis Date    Arthritis     BPH (benign prostatic hyperplasia)     Cancer ([facility] [address] )     Chronic lymphocytic leukemia (CLL), B-cell (HCC)     CPAP (continuous positive airway pressure) dependence     Depression     Hearing aid worn     bilateral    Hyperlipidemia     controlled    Hypertension     controlled    Sinusitis     Sleep apnea     CPAP    Tinnitus     Wears glasses      Past Surgical History:   Procedure Laterality Date    BACK SURGERY  09/2017    laminotomy L3,4 S1-    CARPAL TUNNEL RELEASE Bilateral     CATARACT EXTRACTION      left    CATARACT EXTRACTION W/ INTRAOCULAR LENS IMPLANT Left 12/11/2017    Procedure: EXTRACTION EXTRACAPSULAR CATARACT PHACO INTRAOCULAR LENS (IOL);   Surgeon: Wilbur Anand MD;  Location: [facility] MAIN OR; Service: Ophthalmology    CERVICAL DISCECTOMY      C5-6    COLONOSCOPY      HERNIA REPAIR      l inguinal    IR LOWER EXTREMITY / INTERVENTION  2019    ID EXC SKIN MALIG 1 1-2 CM FACE,FACIAL Right 2022    Procedure: EXCISION SKIN LESION CHEEK WITH FROZEN SECTION;  Surgeon: Lonnie Jerome MD;  Location: [place] MAIN OR;  Service: [facility] Left 2006    TONSILLECTOMY       Social History   Social History     Substance and Sexual Activity   Alcohol Use Yes    Alcohol/week: 7 0 standard drinks    Types: 7 Glasses of wine per week     Social History     Substance and Sexual Activity   Drug Use No     Social History     Tobacco Use   Smoking Status Former Smoker    Packs/day: 1 00    Years: 20 00    Pack years: 20 00    Quit date: 5    Years since quittin 4   Smokeless Tobacco Never Used     Family History   Problem Relation Age of Onset    Cancer Mother         bladder    Heart disease Father     Stroke Father     Parkinsonism Brother        Meds/Allergies     (Not in a hospital admission)    Current Facility-Administered Medications   Medication Dose Route Frequency    Acalabrutinib CAPS 1 capsule  1 capsule Oral BID    albuterol (PROVENTIL HFA,VENTOLIN HFA) inhaler 2 puff  2 puff Inhalation Q4H PRN    aluminum-magnesium hydroxide-simethicone (MYLANTA) oral suspension 30 mL  30 mL Oral Q6H PRN    buPROPion (WELLBUTRIN XL) 24 hr tablet 300 mg  300 mg Oral QAM    fluticasone (FLOVENT HFA) 110 MCG/ACT inhaler 1 puff  1 puff Inhalation Daily    lisinopril (ZESTRIL) tablet 20 mg  20 mg Oral BID    menthol-methyl salicylate (BENGAY) 26-11 % cream   Apply externally 4x Daily PRN    metoprolol succinate (TOPROL-XL) 24 hr tablet 37 5 mg  37 5 mg Oral QAM    multivitamin-minerals (CENTRUM) tablet 1 tablet  1 tablet Oral Daily    ondansetron (ZOFRAN) injection 4 mg  4 mg Intravenous Q6H PRN    pantoprazole (PROTONIX) 80 mg in sodium chloride 0 9 % 100 mL infusion  8 mg/hr Intravenous Continuous    pravastatin (PRAVACHOL) tablet 80 mg  80 mg Oral Daily With Dinner    tadalafil (CIALIS) tablet 5 mg  5 mg Oral Daily       Allergies   Allergen Reactions    Rocephin [Ceftriaxone]      Avoids d/t previous liver toxicity           Objective     Blood pressure 133/64, pulse 60, temperature 98 4 °F (36 9 °C), temperature source Oral, resp  rate 16, weight 75 3 kg (166 lb), SpO2 96 %  No intake or output data in the 24 hours ending 06/22/22 0850      PHYSICAL EXAM:      General Appearance:   Alert, cooperative, no distress, appears stated age    HEENT:   Normocephalic, atraumatic, anicteric, no oropharyngeal thrush present      Neck:  Supple, symmetrical, trachea midline, no adenopathy;    thyroid: no enlargement/tenderness/nodules; no carotid  bruit or JVD    Lungs:   Clear to auscultation bilaterally; no rales, rhonchi or wheezing; respirations unlabored    Heart[de-identified]   S1 and S2 normal; regular rate and rhythm; no murmur, rub, or gallop     Abdomen:   Soft, non-tender, non-distended; normal bowel sounds; no masses, no organomegaly    Genitalia:   Deferred    Rectal:   Deferred    Extremities:  No cyanosis, clubbing or edema    Pulses:  2+ and symmetric all extremities    Skin:  Skin color, texture, turgor normal, no rashes or lesions    Lymph nodes:  No palpable cervical, axillary or inguinal lymphadenopathy        Lab Results:   Results from last 7 days   Lab Units 06/22/22  0442 06/21/22  1545   WBC Thousand/uL 5 60 7 22   HEMOGLOBIN g/dL 13 2 13 2   HEMATOCRIT % 40 1 39 8   PLATELETS Thousands/uL 142* 162   NEUTROS PCT %  --  72   LYMPHS PCT %  --  14   MONOS PCT %  --  12   EOS PCT %  --  2     Results from last 7 days   Lab Units 06/22/22  0442   POTASSIUM mmol/L 4 0   CHLORIDE mmol/L 103   CO2 mmol/L 28   BUN mg/dL 16   CREATININE mg/dL 0 95   CALCIUM mg/dL 8 9   ALK PHOS U/L 45   ALT U/L 17   AST U/L 17     Results from last 7 days   Lab Units 06/21/22  1912   INR 0 98     Results from last 7 days   Lab Units 06/21/22  1545   LIPASE u/L 19       Imaging Studies: I have personally reviewed pertinent imaging studies  No results found  Patient was seen and examined by Dr Robert Osborn  All orozco medical decisions were made by Dr Robert Osborn  Thank you for allowing us to participate in the care of this present patient  We will follow-up with you closely

## 2022-06-22 NOTE — ASSESSMENT & PLAN NOTE
On acalabrutinib and receives IVIG infusions  Has history of thrombocytopenia as well 
On lisinopril 20mg and metoprolol 37 5mg-- continue
On lisinopril 20mg and metoprolol 37 5mg-- continue
Patient takes acalabrutinib (1 capsule BID) and receives IVIG infusions every 2 months  He has a history of thrombocytopenia as well secondary to the immunotherapy 
Was related to ibrutinib, he is no longer on this medication  He does have PVCs, but in NSR  Not on anticoagulation given that he does not have Afib any longer and his history of thrombocytopenia 
Was related to ibrutinib, he is no longer on this medication  He does have PVCs, but in NSR  Not on anticoagulation given that he does not have Afib any longer and his history of thrombocytopenia 
· CC: bloating, nausea, then bloody emesis x1 this AM  · No hx of this in the past, has not had any subsequent episodes  · Has been taking 2 tablets motrin BID for 2 months for arthralgias  · No change in stools- no melena or brbpr  · Hx of CLL and thrombocytopenia-  platelets currently 788  · INR within normal limits  · His last colonoscopy was in 2020 showed small internal hemorrhoids, mild sigmoid diverticulosis, and 2 sessile rectal polyps, as well as some healing ulcers in the ascending colon that were deemed due to ischemic colitis       Plan:  · Pantoprazole infusion  · GI consult  · Avoid NSAIDs  · Bengay for arthralgias  · If not effective, can use lidocaine patch/cream
· CC: bloating, nausea, then bloody emesis x1 yesterday AM  · No hx of this in the past, has not had any subsequent episodes  · Has been taking 2 tablets motrin BID for 2 months for arthralgias  · No change in stools- no melena or brbpr  · Hx of CLL and thrombocytopenia-  platelets 164 on arrival, dropped to 142 today (1 day PTA)  · PT/INR, aPTT within normal limits  · Hgb and RBC counts stable  · His last colonoscopy was in 2020 showed small internal hemorrhoids, mild sigmoid diverticulosis, and 2 sessile rectal polyps, as well as some healing ulcers in the ascending colon that were deemed due to ischemic colitis  · EGD: Linear erythema noted in the antrum of the stomach  Biopsies for H  Pylori collected  A 6-7 mm adenomatous appearing polypoid lesion was removed with snare cautery      Plan:  · Pantoprazole 40 mg daily   · Avoid NSAIDs
15 Hour(s) 35 Minute(s)

## 2022-06-23 NOTE — ASSESSMENT & PLAN NOTE
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

## 2022-06-23 NOTE — ASSESSMENT & PLAN NOTE
He does have moderate STEFANY is not been using his CPAP for last 3 weeks  I did encourage start using his CPAP again  He is on auto CPAP with pressure range of 12-18 cm water

## 2022-06-24 LAB
ATRIAL RATE: 56 BPM
P AXIS: 76 DEGREES
PR INTERVAL: 164 MS
QRS AXIS: 85 DEGREES
QRSD INTERVAL: 98 MS
QT INTERVAL: 434 MS
QTC INTERVAL: 419 MS
T WAVE AXIS: 51 DEGREES
VENTRICULAR RATE: 56 BPM

## 2022-06-24 PROCEDURE — 93010 ELECTROCARDIOGRAM REPORT: CPT | Performed by: INTERNAL MEDICINE

## 2022-07-06 ENCOUNTER — HOSPITAL ENCOUNTER (OUTPATIENT)
Dept: NON INVASIVE DIAGNOSTICS | Facility: CLINIC | Age: 82
Discharge: HOME | End: 2022-07-06
Payer: MEDICARE

## 2022-07-06 DIAGNOSIS — I70.211: ICD-10-CM

## 2022-07-06 PROCEDURE — 93922 UPR/L XTREMITY ART 2 LEVELS: CPT | Performed by: SURGERY

## 2022-07-06 PROCEDURE — 93926 LOWER EXTREMITY STUDY: CPT

## 2022-07-06 PROCEDURE — 93923 UPR/LXTR ART STDY 3+ LVLS: CPT

## 2022-07-06 PROCEDURE — 93926 LOWER EXTREMITY STUDY: CPT | Performed by: SURGERY

## 2022-07-07 ENCOUNTER — OFFICE VISIT (OUTPATIENT)
Dept: GASTROENTEROLOGY | Facility: CLINIC | Age: 82
End: 2022-07-07
Payer: MEDICARE

## 2022-07-07 VITALS
TEMPERATURE: 97.9 F | WEIGHT: 156 LBS | BODY MASS INDEX: 22.33 KG/M2 | DIASTOLIC BLOOD PRESSURE: 66 MMHG | HEIGHT: 70 IN | HEART RATE: 69 BPM | SYSTOLIC BLOOD PRESSURE: 134 MMHG

## 2022-07-07 DIAGNOSIS — K29.01 OTHER ACUTE GASTRITIS WITH HEMORRHAGE: ICD-10-CM

## 2022-07-07 DIAGNOSIS — K92.0 HEMATEMESIS, UNSPECIFIED WHETHER NAUSEA PRESENT: Primary | ICD-10-CM

## 2022-07-07 DIAGNOSIS — K31.7 GASTRIC POLYP: ICD-10-CM

## 2022-07-07 PROCEDURE — 99213 OFFICE O/P EST LOW 20 MIN: CPT | Performed by: INTERNAL MEDICINE

## 2022-07-07 RX ORDER — AMOXICILLIN 500 MG/1
CAPSULE ORAL
COMMUNITY
Start: 2022-05-15

## 2022-07-07 RX ORDER — ROSUVASTATIN CALCIUM 20 MG/1
TABLET, COATED ORAL
COMMUNITY
Start: 2022-04-18

## 2022-07-07 NOTE — PROGRESS NOTES
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 Pantoja was seen at the hospital couple of weeks ago when he was admitted because of an episode of hematemesis  Upper endoscopy showed linear erythema in the antrum, 6-7 mm adenomatous appearing polypoid lesion was removed and the biopsies are still pending  Patient just completed Protonix  Denies any abdominal pain or further episodes of vomiting  He felt nauseous last night after dinner  Good appetite and reports losing few lb gradual in the past year  He had colonoscopy couple of years ago  Chaperon: Ms Lunsford Muse: Review of Systems   Constitutional: Negative for activity change, appetite change, chills, diaphoresis, fatigue, fever and unexpected weight change  HENT: Negative for ear discharge, ear pain, facial swelling, hearing loss, nosebleeds, sore throat, tinnitus and voice change  Eyes: Negative for pain, discharge, redness, itching and visual disturbance  Respiratory: Negative for apnea, cough, chest tightness, shortness of breath and wheezing  Cardiovascular: Negative for chest pain and palpitations  Gastrointestinal:        As noted in HPI   Endocrine: Negative for cold intolerance, heat intolerance and polyuria  Genitourinary: Negative for difficulty urinating, dysuria, flank pain, hematuria and urgency  Musculoskeletal: Negative for arthralgias, back pain, gait problem, joint swelling and myalgias  Skin: Negative for rash and wound  Neurological: Negative for dizziness, tremors, seizures, speech difficulty, light-headedness, numbness and headaches  Hematological: Negative for adenopathy  Does not bruise/bleed easily  Psychiatric/Behavioral: Negative for agitation, behavioral problems and confusion  The patient is not nervous/anxious           Past Medical History:   Diagnosis Date    Arthritis     BPH (benign prostatic hyperplasia)     Cancer (HCC)     Chronic lymphocytic leukemia (CLL), B-cell (HCC)     CPAP (continuous positive airway pressure) dependence     Depression     Hearing aid worn     bilateral    Hyperlipidemia     controlled    Hypertension     controlled    Sinusitis     Sleep apnea     CPAP    Tinnitus     Wears glasses       Past Surgical History:   Procedure Laterality Date    BACK SURGERY  2017    laminotomy L3,4 S1-    CARPAL TUNNEL RELEASE Bilateral     CATARACT EXTRACTION      left    CATARACT EXTRACTION W/ INTRAOCULAR LENS IMPLANT Left 2017    Procedure: EXTRACTION EXTRACAPSULAR CATARACT PHACO INTRAOCULAR LENS (IOL); Surgeon: Fausto Toussaint MD;  [facility] MAIN OR;  Service: Ophthalmology    CERVICAL DISCECTOMY  1982    C5-6    COLONOSCOPY      HERNIA REPAIR      l inguinal    IR LOWER EXTREMITY / INTERVENTION  2019    SC EXC SKIN MALIG 1 1-2 CM FACE,FACIAL Right 2022    Procedure: EXCISION SKIN LESION CHEEK WITH FROZEN SECTION;  Surgeon: Jessica Russell MD;  Location: [address] [place] [address];  Service: Plastics    ROTATOR CUFF REPAIR Left 2006    TONSILLECTOMY       Social History     Socioeconomic History    Marital status: /Civil Union     Spouse name: Not on file    Number of children: Not on file    Years of education: Not on file    Highest education level: Not on file   Occupational History    Not on file   Tobacco Use    Smoking status: Former Smoker     Packs/day: 1 00     Years: 20 00     Pack years: 20 00     Quit date:      Years since quittin 5    Smokeless tobacco: Never Used   Vaping Use    Vaping Use: Never used   Substance and Sexual Activity    Alcohol use:  Yes     Alcohol/week: 7 0 standard drinks     Types: 7 Glasses of wine per week    Drug use: No    Sexual activity: Yes     Partners: Female   Other Topics Concern    Not on file   Social History Narrative    Not on file     Social Determinants of Health     Financial Resource Strain: Not on file   Food Insecurity: Not on file   Transportation Needs: Not on file   Physical Activity: Not on file   Stress: Not on file   Social Connections: Not on file   Intimate Partner Violence: Not on file   Housing Stability: Not on file     Family History   Problem Relation Age of Onset    Cancer Mother         bladder    Heart disease Father     Stroke Father     Parkinsonism Brother      Rocephin [ceftriaxone]  Current Outpatient Medications   Medication Sig Dispense Refill    Acalabrutinib 100 MG CAPS Take 1 capsule by mouth 2 (two) times a day      albuterol (ProAir HFA) 90 mcg/act inhaler Inhale 2 puffs every 4 (four) hours as needed for wheezing 8 5 g 6    Arnuity Ellipta 100 MCG/ACT AEPB inhaler Inhale 1 puff daily Rinse mouth after use  1 blister 6    buPROPion (WELLBUTRIN XL) 300 mg 24 hr tablet Take 300 mg by mouth every morning      immune globulin, human (Bivigam) 5 GM/50ML 15mg IV every 8 weeks      lisinopril (ZESTRIL) 20 mg tablet Take 20 mg by mouth 2 (two) times a day Taking 1 qd      metoprolol succinate (TOPROL-XL) 25 mg 24 hr tablet Take 25 mg by mouth every morning Takes 1 1/12 a m        Multiple Vitamins-Minerals (PRESERVISION AREDS 2 PO) Take by mouth 2 (two) times a day      tadalafil (CIALIS) 5 MG tablet       amoxicillin (AMOXIL) 500 mg capsule TAKE 4 CAPSULES BY MOUTH ONE HOUR PRIOR TO DENTAL APPOINTMENT (Patient not taking: Reported on 7/7/2022)      pantoprazole (PROTONIX) 40 mg tablet Take 1 tablet (40 mg total) by mouth daily (Patient not taking: Reported on 7/7/2022) 30 tablet 1    rosuvastatin (CRESTOR) 10 MG tablet Take 10 mg by mouth daily        rosuvastatin (CRESTOR) 20 MG tablet        No current facility-administered medications for this visit  Blood pressure 134/66, pulse 69, temperature 97 9 °F (36 6 °C), temperature source Temporal, height 5' 10" (1 778 m), weight 70 8 kg (156 lb)      PHYSICAL EXAM: Physical Exam  Constitutional:       Appearance: He is well-developed  HENT:      Head: Normocephalic and atraumatic  Eyes:      General: No scleral icterus  Right eye: No discharge  Left eye: No discharge  Conjunctiva/sclera: Conjunctivae normal       Pupils: Pupils are equal, round, and reactive to light  Neck:      Thyroid: No thyromegaly  Vascular: No JVD  Trachea: No tracheal deviation  Cardiovascular:      Rate and Rhythm: Normal rate and regular rhythm  Heart sounds: Normal heart sounds  No murmur heard  No friction rub  No gallop  Pulmonary:      Effort: Pulmonary effort is normal  No respiratory distress  Breath sounds: Normal breath sounds  No wheezing or rales  Chest:      Chest wall: No tenderness  Abdominal:      General: Bowel sounds are normal  There is no distension  Palpations: Abdomen is soft  There is no mass  Tenderness: There is no abdominal tenderness  There is no guarding or rebound  Hernia: No hernia is present  Musculoskeletal:      Cervical back: Neck supple  Lymphadenopathy:      Cervical: No cervical adenopathy  Skin:     General: Skin is warm and dry  Findings: No erythema or rash  Neurological:      Mental Status: He is alert and oriented to person, place, and time  Psychiatric:         Behavior: Behavior normal          Thought Content:  Thought content normal           Lab Results   Component Value Date    WBC 5 60 06/22/2022    HGB 13 2 06/22/2022    HCT 40 1 06/22/2022    MCV 97 06/22/2022     (L) 06/22/2022     Lab Results   Component Value Date    CALCIUM 8 9 06/22/2022    K 4 0 06/22/2022    CO2 28 06/22/2022     06/22/2022    BUN 16 06/22/2022    CREATININE 0 95 06/22/2022     Lab Results   Component Value Date    ALT 17 06/22/2022    AST 17 06/22/2022    ALKPHOS 45 06/22/2022     Lab Results   Component Value Date    INR 0 98 06/21/2022    INR 0 94 01/04/2021    INR 0 95 12/02/2019    PROTIME 13 0 06/21/2022    PROTIME 10 6 01/04/2021 PROTIME 12 8 12/02/2019       No results found  ASSESSMENT & PLAN:    Hematemesis  Was seen in the hospital couple of weeks ago because of an episode of hematemesis  Has been doing well since then and denies any more symptoms  -follow-up on biopsy results still pending     -Patient was explained about the lifestyle and dietary modifications  Advised to avoid fatty foods, chocolates, caffeine, alcohol and any other triggering foods     -advised patient to call for any GI symptoms

## 2022-07-07 NOTE — ASSESSMENT & PLAN NOTE
Was seen in the hospital couple of weeks ago because of an episode of hematemesis  Has been doing well since then and denies any more symptoms  -follow-up on biopsy results still pending     -Patient was explained about the lifestyle and dietary modifications  Advised to avoid fatty foods, chocolates, caffeine, alcohol and any other triggering foods     -advised patient to call for any GI symptoms

## 2022-07-20 ENCOUNTER — OFFICE VISIT (OUTPATIENT)
Dept: VASCULAR SURGERY | Facility: CLINIC | Age: 82
End: 2022-07-20
Payer: MEDICARE

## 2022-07-20 VITALS
SYSTOLIC BLOOD PRESSURE: 122 MMHG | WEIGHT: 161 LBS | HEART RATE: 62 BPM | TEMPERATURE: 98.4 F | DIASTOLIC BLOOD PRESSURE: 70 MMHG | HEIGHT: 70 IN | BODY MASS INDEX: 23.05 KG/M2

## 2022-07-20 DIAGNOSIS — I73.9 PAD (PERIPHERAL ARTERY DISEASE) (HCC): Primary | ICD-10-CM

## 2022-07-20 PROCEDURE — 99213 OFFICE O/P EST LOW 20 MIN: CPT | Performed by: NURSE PRACTITIONER

## 2022-07-20 NOTE — PROGRESS NOTES
Assessment/Plan:    PAD (peripheral artery disease) [facility])  63-year-old male former smoker with HTN, HLD, h/o AFib, CLL (Acalabrutinib), STEFANY, asthma, PAD with claudication s/p RLE atherectomy and PTA SFA/pop 2019 presents to review recent imaging     - patient is without complaints  Feels excellent, remains very active  - denies claudication or rest pain  - palpable DP pulses bilaterally  - LEAD 07/06/2022  RIGHT-Diffuse disease throughout the femoral-popliteal arteries with residual 50-75% stenosis in the distal SFA  BRUNO R- 1 19/107/114,  L 1 14/94/73    Plan:   - continue statin  - can not tolerate ASA secondary to CCL med Acalabrutinib  - LEAD in 1 year with return office visit  - call or return to office sooner with new or worsening symptoms, questions or concerns    Hyperlipidemia  -stable  -continue statin therapy  -management per PCP      Hypertension  -BP well controlled  -continue current medical regimen  -management per PCP         Diagnoses and all orders for this visit:    PAD (peripheral artery disease) ([facility])  -     VAS lower limb arterial duplex, complete bilateral; Future          Subjective:      Patient ID: Lester Sharif is a 80 y o  male  Patient presents today to review LEXY s/p RLE agram w/ intervention 12/2/19  Patient denies claudication symptoms  No rest pain or wounds  HPI  See assessment and plan     63-year-old male, former smoker presents to review recent imaging and [facility]  Patient has history of PAD with claudication s/p atherectomy and PTA of SFA/popliteal in 2019  Patient remains stable, denies claudication or rest pain  Patient is very active with walking and water aerobics daily  Patient is on daily statin therapy however unable to tolerate ASA secondary to CLL medication and increased ecchymosis  We will obtain lower extremity arterial duplex in 1 year with return office visit    Advised patient to call or return to office sooner with new or working sitting symptoms, questions or concerns      The following portions of the patient's history were reviewed and updated as appropriate: allergies, current medications, past family history, past medical history, past social history, past surgical history and problem list     Review of Systems   Constitutional: Negative  HENT: Negative  Eyes: Negative  Respiratory: Positive for apnea  Cardiovascular: Negative  Gastrointestinal: Negative  Endocrine: Negative  Genitourinary: Negative  Musculoskeletal: Positive for arthralgias  Skin: Negative  Allergic/Immunologic: Negative  Neurological: Negative  Hematological: Negative  Psychiatric/Behavioral: Negative  I have reviewed and made appropriate changes to the review of systems input by the medical assistant  Objective:      /70 (BP Location: Right arm, Patient Position: Sitting)   Pulse 62   Temp 98 4 °F (36 9 °C) (Tympanic)   Ht 5' 10" (1 778 m)   Wt 73 kg (161 lb)   BMI 23 10 kg/m²          Physical Exam  Vitals reviewed  Constitutional:       Appearance: Normal appearance  He is normal weight  HENT:      Head: Normocephalic and atraumatic  Neck:      Vascular: No carotid bruit  Cardiovascular:      Rate and Rhythm: Normal rate and regular rhythm  Pulses: Normal pulses  Carotid pulses are 2+ on the right side and 2+ on the left side  Radial pulses are 2+ on the right side and 2+ on the left side  Dorsalis pedis pulses are 2+ on the right side and 2+ on the left side  Heart sounds: Normal heart sounds  Pulmonary:      Effort: Pulmonary effort is normal  No respiratory distress  Breath sounds: Normal breath sounds  No wheezing  Musculoskeletal:         General: Normal range of motion  Cervical back: Normal range of motion and neck supple  Right lower leg: No edema  Left lower leg: No edema  Skin:     General: Skin is warm and dry        Capillary Refill: Capillary refill takes less than 2 seconds  Comments: LLE varicose veins   Neurological:      Mental Status: He is alert and oriented to person, place, and time  Sensory: No sensory deficit  Motor: No weakness  Psychiatric:         Behavior: Behavior normal            Vitals:    07/20/22 1026   BP: 122/70   BP Location: Right arm   Patient Position: Sitting   Pulse: 62   Temp: 98 4 °F (36 9 °C)   TempSrc: Tympanic   Weight: 73 kg (161 lb)   Height: 5' 10" (1 778 m)       Patient Active Problem List   Diagnosis    Mild intermittent asthma without complication    Cough    Obstructive sleep apnea    CLL (chronic lymphocytic leukemia) (HCC)    Hyperlipidemia    PAD (peripheral artery disease) (HCC)    Hypertension    IgG deficiency (HCC)    History of atrial fibrillation    Hematemesis    CPAP (continuous positive airway pressure) dependence    Other gastritis with bleeding    Gastric polyp       Past Surgical History:   Procedure Laterality Date    BACK SURGERY  09/2017    laminotomy L3,4 S1-    CARPAL TUNNEL RELEASE Bilateral     CATARACT EXTRACTION      left    CATARACT EXTRACTION W/ INTRAOCULAR LENS IMPLANT Left 12/11/2017    Procedure: EXTRACTION EXTRACAPSULAR CATARACT PHACO INTRAOCULAR LENS (IOL);   Surgeon: Daryl Soto MD;  [facility] MA[facility];  Service: Ophthalmology    CERVICAL DISCECTOMY  1982    C5-6    COLONOSCOPY      HERNIA REPAIR      l inguinal    IR LOWER EXTREMITY / INTERVENTION  12/2/2019    WI EXC SKIN MALIG 1 1-2 CM FACE,FACIAL Right 1/11/2022    Procedure: EXCISION SKIN LESION CHEEK WITH FROZEN SECTION;  Surgeon: Trisha Case MD;  Location: [facility] MAIN OR;  Service: Plastics    ROTATOR CUFF REPAIR Left 2006    TONSILLECTOMY         Family History   Problem Relation Age of Onset   Tha Lemme Cancer Mother         bladder    Heart disease Father     Stroke Father     Parkinsonism Brother        Social History     Socioeconomic History    Marital status: /Civil Union     Spouse name: Not on file    Number of children: Not on file    Years of education: Not on file    Highest education level: Not on file   Occupational History    Not on file   Tobacco Use    Smoking status: Former Smoker     Packs/day: 1 00     Years: 20 00     Pack years: 20 00     Quit date: 5     Years since quittin 5    Smokeless tobacco: Never Used   Vaping Use    Vaping Use: Never used   Substance and Sexual Activity    Alcohol use:  Yes     Alcohol/week: 7 0 standard drinks     Types: 7 Glasses of wine per week    Drug use: No    Sexual activity: Yes     Partners: Female   Other Topics Concern    Not on file   Social History Narrative    Not on file     Social Determinants of Health     Financial Resource Strain: Not on file   Food Insecurity: Not on file   Transportation Needs: Not on file   Physical Activity: Not on file   Stress: Not on file   Social Connections: Not on file   Intimate Partner Violence: Not on file   Housing Stability: Not on file       Allergies   Allergen Reactions    Rocephin [Ceftriaxone]      Avoids d/t previous liver toxicity         Current Outpatient Medications:     Acalabrutinib 100 MG CAPS, Take 1 capsule by mouth 2 (two) times a day, Disp: , Rfl:     albuterol (ProAir HFA) 90 mcg/act inhaler, Inhale 2 puffs every 4 (four) hours as needed for wheezing, Disp: 8 5 g, Rfl: 6    Arnuity Ellipta 100 MCG/ACT AEPB inhaler, Inhale 1 puff daily Rinse mouth after use , Disp: 1 blister, Rfl: 6    buPROPion (WELLBUTRIN XL) 300 mg 24 hr tablet, Take 300 mg by mouth every morning, Disp: , Rfl:     immune globulin, human (Bivigam) 5 GM/50ML, 15mg IV every 8 weeks, Disp: , Rfl:     lisinopril (ZESTRIL) 20 mg tablet, Take 20 mg by mouth 2 (two) times a day Taking 1 qd, Disp: , Rfl:     metoprolol succinate (TOPROL-XL) 25 mg 24 hr tablet, Take 25 mg by mouth every morning Takes 1 1/12 a m  , Disp: , Rfl:     Multiple Vitamins-Minerals (PRESERVISION AREDS 2 PO), Take by mouth 2 (two) times a day, Disp: , Rfl:     rosuvastatin (CRESTOR) 20 MG tablet, , Disp: , Rfl:     tadalafil (CIALIS) 5 MG tablet, , Disp: , Rfl:     amoxicillin (AMOXIL) 500 mg capsule, TAKE 4 CAPSULES BY MOUTH ONE HOUR PRIOR TO DENTAL APPOINTMENT (Patient not taking: No sig reported), Disp: , Rfl:     pantoprazole (PROTONIX) 40 mg tablet, Take 1 tablet (40 mg total) by mouth daily (Patient not taking: No sig reported), Disp: 30 tablet, Rfl: 1    rosuvastatin (CRESTOR) 10 MG tablet, Take 10 mg by mouth daily  , Disp: , Rfl:

## 2022-07-20 NOTE — ASSESSMENT & PLAN NOTE
82-year-old male former smoker with HTN, HLD, h/o AFib, CLL (Acalabrutinib), STEFANY, asthma, PAD with claudication s/p RLE atherectomy and PTA SFA/pop 2019 presents to review recent imaging     - patient is without complaints  Feels excellent, remains very active  - denies claudication or rest pain  - palpable DP pulses bilaterally  - LEAD 07/06/2022  RIGHT-Diffuse disease throughout the femoral-popliteal arteries with residual 50-75% stenosis in the distal SFA    BRUNO R- 1 19/107/114,  L 1 14/94/73    Plan:   - continue statin  - can not tolerate ASA secondary to CCL med Acalabrutinib  - LEAD in 1 year with return office visit  - call or return to office sooner with new or worsening symptoms, questions or concerns

## 2022-07-20 NOTE — PATIENT INSTRUCTIONS
Continue statin   Frequent ambulation, walking program   Lower extremity arterial duplex in 1 year with return office visit   Call or return to office sooner with new or worsening symptoms, claudication or rest pain    Peripheral Artery Disease   AMBULATORY CARE:   Peripheral artery disease (PAD)  is narrow, weak, or blocked arteries  It may affect any arteries outside of your heart and brain  PAD is usually the result of a buildup of fat and cholesterol, also called plaque, along your artery walls  Inflammation, a blood clot, or abnormal cell growth could also block your arteries  PAD prevents normal blood flow to your legs and arms  You are at risk of an amputation if poor blood flow keeps wounds from healing or causes gangrene (tissue death)  Without treatment, PAD can also cause a heart attack or stroke  Common symptoms include:  Mild PAD usually does not cause symptoms   As the disease worsens over time, you may have the following:  Pain or cramps in your leg or hip while you walk    A numb, weak, or heavy feeling in your legs    Dry, scaly, red, or pale skin on your legs    Thick or brittle nails, or hair loss on your arms and legs    Foot sores that will not heal    Burning or aching in your feet and toes while resting (this may be worse when you lie down)    Call your local emergency number (911 in the [address] [facility]) if:   You have any of the following signs of a heart attack:      Squeezing, pressure, or pain in your chest    You may  also have any of the following:     Discomfort or pain in your back, neck, jaw, stomach, or arm    Shortness of breath    Nausea or vomiting    Lightheadedness or a sudden cold sweat    You have any of the following signs of a stroke:      Numbness or drooping on one side of your face     Weakness in an arm or leg    Confusion or difficulty speaking    Dizziness, a severe headache, or vision loss    Seek care immediately if:   You have sores or wounds that will not heal     You notice black or discolored skin on your arm or leg  Your skin is cool to the touch  Call your doctor if:   You have leg pain when you walk 1/8 mile (200 meters) or less, even with treatment  Your legs are red, dry, or pale, even with treatment  You have questions or concerns about your condition or care  Treatment for PAD  can help reduce your risk of a heart attack, stroke, or amputation  You may need more than one of the following:  Medicines  may be given to prevent blood clots and reduce the risk of a heart attack or stroke  You may be given medicine to help prevent your PAD from getting worse  A supervised exercise program  helps you stay active in normal daily activities  Healthcare providers will help you safely walk or do strength training exercises 3 times a week for 30 to 60 minutes  You will do this for several months, then transition to walking on your own  Angioplasty  is a procedure to open your artery so blood can flow through normally  A thin tube called a catheter is used to insert a small balloon into your artery  The balloon is inflated to open your blocked artery, and then removed  A tube called a stent may be placed in your artery to hold it open  Bypass surgery  is used to make a new connection to your artery with a vein from another part of your body, or an artificial graft  The vein or graft is attached to your artery above and below your blockage  This allows blood to flow around the blocked portion of your artery  Manage and prevent PAD:   Walk for 30 to 60 minutes at least 4 times a week  Your healthcare provider may also refer you to a supervised exercise program  The program helps increase how far you can walk without pain  It also helps you stay active in normal daily activities         Do not smoke  Nicotine and other chemicals in cigarettes and cigars can worsen PAD  They can also increase your risk for a heart attack or stroke   Ask your healthcare provider for information if you currently smoke and need help to quit  E-cigarettes or smokeless tobacco still contain nicotine  Talk to your healthcare provider before you use these products  Manage other health conditions  Take your medicines as directed and follow your healthcare provider's instructions if you have high blood pressure or high cholesterol  Perform foot care and check your blood sugar levels as directed if you have diabetes  Eat heart-healthy foods  Eat whole grains, fruits, and vegetables every day  Limit salt and high-fat foods  Ask your healthcare provider for more information on a heart healthy diet  Ask what a healthy weight is for you  Your healthcare provider can help you create a healthy weight-loss plan, if needed  Follow up with your doctor as directed:  Write down your questions so you remember to ask them during your visits  © Copyright [employer] 2022 Information is for End User's use only and may not be sold, redistributed or otherwise used for commercial purposes  All illustrations and images included in CareNotes® are the copyrighted property of A D A M , Inc  or [address] [place], [place] [address]   The above information is an  only  It is not intended as medical advice for individual conditions or treatments  Talk to your doctor, nurse or pharmacist before following any medical regimen to see if it is safe and effective for you

## 2022-08-01 NOTE — TELEPHONE ENCOUNTER
Patient calling stating he got a new cpap mask but he doesn't have a strap for it and is being told his insurance won't cover for him to have the strap until September  He wants to know if there is some way we can get him a strap so he can use the mask   Please advise

## 2022-08-02 NOTE — TELEPHONE ENCOUNTER
Called Ethan at [phone], s/w Fredis Donald whom advised ot had received a new mask with headgear in march so his insurance will not cover head gear at all til sept even if he rec;'d a new mask in July  He can pay out of pocket 24 or wait til sept   LM for pt to advise

## 2022-08-10 ENCOUNTER — TELEPHONE (OUTPATIENT)
Dept: HEMATOLOGY ONCOLOGY | Facility: CLINIC | Age: 82
End: 2022-08-10

## 2022-08-10 NOTE — TELEPHONE ENCOUNTER
Patient is scheduled to see Dr Danilo Pimentel on 8/17/22 @ 1pm  He will have labs drawn this week when he goes for his IVIG infusion    Appointment ok to add per Dr Danilo Pimentel

## 2022-08-11 ENCOUNTER — HOSPITAL ENCOUNTER (OUTPATIENT)
Dept: INFUSION CENTER | Facility: CLINIC | Age: 82
Discharge: HOME | End: 2022-08-11
Payer: MEDICARE

## 2022-08-11 VITALS
HEART RATE: 61 BPM | RESPIRATION RATE: 20 BRPM | DIASTOLIC BLOOD PRESSURE: 71 MMHG | WEIGHT: 161.38 LBS | OXYGEN SATURATION: 96 % | TEMPERATURE: 96.8 F | SYSTOLIC BLOOD PRESSURE: 142 MMHG | BODY MASS INDEX: 23.16 KG/M2

## 2022-08-11 DIAGNOSIS — D80.3 IGG DEFICIENCY (HCC): ICD-10-CM

## 2022-08-11 DIAGNOSIS — C91.10 CLL (CHRONIC LYMPHOCYTIC LEUKEMIA) (HCC): Primary | ICD-10-CM

## 2022-08-11 PROCEDURE — 96375 TX/PRO/DX INJ NEW DRUG ADDON: CPT

## 2022-08-11 PROCEDURE — 96366 THER/PROPH/DIAG IV INF ADDON: CPT

## 2022-08-11 PROCEDURE — 96365 THER/PROPH/DIAG IV INF INIT: CPT

## 2022-08-11 RX ORDER — ACETAMINOPHEN 325 MG/1
650 TABLET ORAL ONCE
Status: COMPLETED | OUTPATIENT
Start: 2022-08-11 | End: 2022-08-11

## 2022-08-11 RX ORDER — DIPHENHYDRAMINE HCL 25 MG
12.5 TABLET ORAL ONCE
Status: COMPLETED | OUTPATIENT
Start: 2022-08-11 | End: 2022-08-11

## 2022-08-11 RX ORDER — ACETAMINOPHEN 325 MG/1
650 TABLET ORAL ONCE
Status: CANCELLED | OUTPATIENT
Start: 2022-10-06

## 2022-08-11 RX ORDER — DIPHENHYDRAMINE HCL 25 MG
12.5 TABLET ORAL ONCE
Status: CANCELLED | OUTPATIENT
Start: 2022-10-06

## 2022-08-11 RX ORDER — SODIUM CHLORIDE 9 MG/ML
20 INJECTION, SOLUTION INTRAVENOUS ONCE
Status: COMPLETED | OUTPATIENT
Start: 2022-08-11 | End: 2022-08-11

## 2022-08-11 RX ORDER — SODIUM CHLORIDE 9 MG/ML
20 INJECTION, SOLUTION INTRAVENOUS ONCE
Status: CANCELLED | OUTPATIENT
Start: 2022-10-06

## 2022-08-11 RX ADMIN — Medication 30 G: at 09:04

## 2022-08-11 RX ADMIN — DIPHENHYDRAMINE HCL 12.5 MG: 25 TABLET, COATED ORAL at 08:21

## 2022-08-11 RX ADMIN — SODIUM CHLORIDE 20 ML/HR: 0.9 INJECTION, SOLUTION INTRAVENOUS at 08:20

## 2022-08-11 RX ADMIN — ACETAMINOPHEN 650 MG: 325 TABLET ORAL at 08:21

## 2022-08-11 RX ADMIN — HYDROCORTISONE SODIUM SUCCINATE 100 MG: 100 INJECTION, POWDER, FOR SOLUTION INTRAMUSCULAR; INTRAVENOUS at 08:21

## 2022-08-11 NOTE — PATIENT INSTRUCTIONS
August 2022 Sunday Monday Tuesday Wednesday Thursday Friday Saturday        1     2     3     4     5     6       7     8     9     10     11    INF THERAPY PLAN   8:00 AM   (300 min )   AN INF CHAIR [place] 12     13       14     15     16     17    FOLLOW UP PG   1:05 PM   (20 min )   Kandace Baum MD   [facility] [place] [address]     77     80       95     28     18     30     29     79     96       68     84     46     49

## 2022-08-11 NOTE — PROGRESS NOTES
Patient tolerated treatment today without incident  He aware he does not have his next infusion appointment  He has an appointment with Dr Garry Pal 8/17/22 and will make next appointment after that

## 2022-08-17 ENCOUNTER — OFFICE VISIT (OUTPATIENT)
Dept: HEMATOLOGY ONCOLOGY | Facility: CLINIC | Age: 82
End: 2022-08-17
Payer: MEDICARE

## 2022-08-17 ENCOUNTER — TELEPHONE (OUTPATIENT)
Dept: HEMATOLOGY ONCOLOGY | Facility: CLINIC | Age: 82
End: 2022-08-17

## 2022-08-17 VITALS
HEIGHT: 70 IN | HEART RATE: 65 BPM | SYSTOLIC BLOOD PRESSURE: 122 MMHG | OXYGEN SATURATION: 96 % | RESPIRATION RATE: 17 BRPM | WEIGHT: 167 LBS | DIASTOLIC BLOOD PRESSURE: 78 MMHG | TEMPERATURE: 97.4 F | BODY MASS INDEX: 23.91 KG/M2

## 2022-08-17 DIAGNOSIS — G47.33 OBSTRUCTIVE SLEEP APNEA: Chronic | ICD-10-CM

## 2022-08-17 DIAGNOSIS — D80.3 IGG DEFICIENCY (HCC): Primary | ICD-10-CM

## 2022-08-17 DIAGNOSIS — D80.3 IGG DEFICIENCY (HCC): ICD-10-CM

## 2022-08-17 DIAGNOSIS — E78.5 HYPERLIPIDEMIA, UNSPECIFIED HYPERLIPIDEMIA TYPE: ICD-10-CM

## 2022-08-17 DIAGNOSIS — I10 PRIMARY HYPERTENSION: ICD-10-CM

## 2022-08-17 DIAGNOSIS — C91.10 CLL (CHRONIC LYMPHOCYTIC LEUKEMIA) (HCC): ICD-10-CM

## 2022-08-17 DIAGNOSIS — C91.10 CLL (CHRONIC LYMPHOCYTIC LEUKEMIA) (HCC): Primary | ICD-10-CM

## 2022-08-17 DIAGNOSIS — Z86.79 HISTORY OF ATRIAL FIBRILLATION: ICD-10-CM

## 2022-08-17 DIAGNOSIS — Z99.89 CPAP (CONTINUOUS POSITIVE AIRWAY PRESSURE) DEPENDENCE: ICD-10-CM

## 2022-08-17 PROCEDURE — 99214 OFFICE O/P EST MOD 30 MIN: CPT | Performed by: INTERNAL MEDICINE

## 2022-08-17 NOTE — TELEPHONE ENCOUNTER
Appointment Confirmation (to confirm pre existing appointments - ONLY)  No need to route   Appointment with  Dr Monica Anton   Appointment date & time 8/17 @ 120pm    Location [facility]    Patient verbilized Understanding Yes

## 2022-08-17 NOTE — PATIENT INSTRUCTIONS
Blood work around 10/01/2022  Patient to continue taking acalabrutinib  He receives IVIG once every 8 weeks and is due again on 10/06/2022  Follow-up in 4 months  He will be due for 2nd dose of Evusheld towards the end of November 2022  He had 1st dose in [place] end of May 2022   CBC D and CMP every month

## 2022-08-17 NOTE — TELEPHONE ENCOUNTER
Left message for patient with updated infusion information  Advised patient to call back to go over schedule in more detail

## 2022-08-17 NOTE — TELEPHONE ENCOUNTER
Please schedule patient for IVIG in October and F[facility]ha for end of November  Patient prefers early morning appts  Thank you!

## 2022-08-17 NOTE — TELEPHONE ENCOUNTER
While we try to accommodate patient requests, our priority is to schedule treatment according to Doctor's orders and site availability  [facility]     Return in about 4 months (around 12/17/2022)  Check out comments: Blood work around 10/01/2022   Patient to continue taking acalabrutinib   He receives IVIG once every 8 weeks and is due again on 10/06/2022   Follow-up in 4 months   He will be due for 2nd dose of Evusheld towards the end of November 2022  He had 1st dose in [place] end of May 2022  CBC D and CMP every month     PROOTHI  1  Does the Provider use the intake sheet or checkout note? CHECKOUT NOTES  2  What would be a preferred day of the week that would work best for your infusion appointment? ANY  3  Do you prefer mornings or afternoons for your appointments? MORNING 8AM   4  Are there any days or dates that do not work for your schedule, including any upcoming vacations? NONE  5  We are going to try our best to schedule you at the infusion center closest to your home  In the event that we are unable to what would be your next preferred infusion site or sites? [facility]    1  AN  2  AN  3  AN    6  Do you have transportation to take you to all of your appointments? YES  7  Would you like the infusion center to draw labs from your port? NO (disregard if patient doesn't have a port or need labs for infusion appointment)

## 2022-08-17 NOTE — PROGRESS NOTES
HPI:   Patient is here with his wife  He has been on acalabrutinib 100 mg twice a day for CLL and he  gets that from [facility]  Patient has been getting IVIG therapy 30 g once every 2 months  Patient has history of atrial fibrillation and that is 1 of the side about top acalabrutinib   No bleeding  He is not on blood thinner  He goes to see Dr Shaye Dorsey at [facility]  He has history of mild asthma and sleep apnea and he uses CPAP at night and sleeps better with that  Much improved exertional dyspnea  Has some tiredness occasionally  He spends time in [place] and he continues to receive IVIG therapy from a hematologist in [address]:  08/17/22 Reviewed 13 systems: See symptoms in HPI  Presently no  other neurological, cardiac, pulmonary, GI and  symptoms other than listed in HPI  Other symptoms are in HPI  No   fever, chills,  bleeding, bone pains, skin rash, weight loss, night sweats, arthritic symptoms,   weakness, numbness, claudication and gait problem  No more frequent infections  Not unusually sensitive to heat or cold  No swelling of the ankles  No swollen glands  Patient is not anxious  Physical Exam:  Vitals:    08/17/22 1318   BP: 122/78   BP Location: Left arm   Patient Position: Sitting   Cuff Size: Adult   Pulse: 65   Resp: 17   Temp: (!) 97 4 °F (36 3 °C)   SpO2: 96%   Weight: 75 8 kg (167 lb)   Height: 5' 10" (1 778 m)     Physical examination:    Patient is alert and oriented  Patient is not in distress  Vital signs are above  There is no icterus, no oral thrush, no palpable neck mass, clear lung fields  to percussion auscultation, regular heart rate at present,   soft and non tender abdomen, no palpable abdominal mass, no ascites, no edema of ankles, no calf tenderness, no focal neurological deficit, no skin rash, no palpable lymphadenopathy,  no clubbing  Patient is not anxious  Performance status 1      Historical Information   Past Medical History:   Diagnosis Date  Arthritis     BPH (benign prostatic hyperplasia)     Cancer (HCC)     Chronic lymphocytic leukemia (CLL), B-cell (HCC)     CPAP (continuous positive airway pressure) dependence     Depression     Hearing aid worn     bilateral    Hyperlipidemia     controlled    Hypertension     controlled    Sinusitis     Sleep apnea     CPAP    Tinnitus     Wears glasses      Past Surgical History:   Procedure Laterality Date    BACK SURGERY  2017    laminotomy L3,4 S1-    CARPAL TUNNEL RELEASE Bilateral     CATARACT EXTRACTION      left    CATARACT EXTRACTION W/ INTRAOCULAR LENS IMPLANT Left 2017    Procedure: EXTRACTION EXTRACAPSULAR CATARACT PHACO INTRAOCULAR LENS (IOL);   Surgeon: Keeley Najera MD;  Location: [facility] MAIN OR;  Service: Ophthalmology    CERVICAL DISCECTOMY      C5-6    COLONOSCOPY      HERNIA REPAIR      l inguinal    IR LOWER EXTREMITY / INTERVENTION  2019    AR EXC SKIN MALIG 1 1-2 CM FACE,FACIAL Right 2022    Procedure: EXCISION SKIN LESION CHEEK WITH FROZEN SECTION;  Surgeon: Carlos Chiu MD;  Location: [place] MAIN OR;  Service: [facility] Left 2006    TONSILLECTOMY       Social History   Social History     Substance and Sexual Activity   Alcohol Use Yes    Alcohol/week: 7 0 standard drinks    Types: 7 Glasses of wine per week     Social History     Substance and Sexual Activity   Drug Use No     Social History     Tobacco Use   Smoking Status Former Smoker    Packs/day: 1 00    Years: 20 00    Pack years: 20 00    Quit date: 5    Years since quittin 6   Smokeless Tobacco Never Used     Family History:   Family History   Problem Relation Age of Onset    Cancer Mother         bladder    Heart disease Father     Stroke Father     Parkinsonism Brother          Current Outpatient Medications:     Acalabrutinib 100 MG CAPS, Take 1 capsule by mouth 2 (two) times a day, Disp: , Rfl:     albuterol (ProAir HFA) 90 mcg/act inhaler, Inhale 2 puffs every 4 (four) hours as needed for wheezing, Disp: 8 5 g, Rfl: 6    buPROPion (WELLBUTRIN XL) 300 mg 24 hr tablet, Take 300 mg by mouth every morning, Disp: , Rfl:     immune globulin, human (Bivigam) 5 GM/50ML, 15mg IV every 8 weeks, Disp: , Rfl:     lisinopril (ZESTRIL) 20 mg tablet, Take 20 mg by mouth 2 (two) times a day Taking 1 qd, Disp: , Rfl:     metoprolol succinate (TOPROL-XL) 25 mg 24 hr tablet, Take 25 mg by mouth every morning Takes 1 1/12 a m  , Disp: , Rfl:     Multiple Vitamins-Minerals (PRESERVISION AREDS 2 PO), Take by mouth 2 (two) times a day, Disp: , Rfl:     rosuvastatin (CRESTOR) 10 MG tablet, Take 10 mg by mouth daily  , Disp: , Rfl:     rosuvastatin (CRESTOR) 20 MG tablet, , Disp: , Rfl:     tadalafil (CIALIS) 5 MG tablet, , Disp: , Rfl:     amoxicillin (AMOXIL) 500 mg capsule, TAKE 4 CAPSULES BY MOUTH ONE HOUR PRIOR TO DENTAL APPOINTMENT (Patient not taking: No sig reported), Disp: , Rfl:     Arnuity Ellipta 100 MCG/ACT AEPB inhaler, Inhale 1 puff daily Rinse mouth after use , Disp: 1 blister, Rfl: 6    pantoprazole (PROTONIX) 40 mg tablet, Take 1 tablet (40 mg total) by mouth daily (Patient not taking: No sig reported), Disp: 30 tablet, Rfl: 1    Allergies   Allergen Reactions    Rocephin [Ceftriaxone]      Avoids d/t previous liver toxicity           Lab Results: I have reviewed all pertinent labs    LABS:    Results for orders placed or performed during the hospital encounter of 06/21/22   CBC and differential   Result Value Ref Range    WBC 7 22 4 31 - 10 16 Thousand/uL    RBC 4 14 3 88 - 5 62 Million/uL    Hemoglobin 13 2 12 0 - 17 0 g/dL    Hematocrit 39 8 36 5 - 49 3 %    MCV 96 82 - 98 fL    MCH 31 9 26 8 - 34 3 pg    MCHC 33 2 31 4 - 37 4 g/dL    RDW 13 2 11 6 - 15 1 %    MPV 11 3 8 9 - 12 7 fL    Platelets 692 942 - 563 Thousands/uL    nRBC 0 /100 WBCs    Neutrophils Relative 72 43 - 75 %    Immat GRANS % 0 0 - 2 %    Lymphocytes Relative 14 14 - 44 %    Monocytes Relative 12 4 - 12 %    Eosinophils Relative 2 0 - 6 %    Basophils Relative 0 0 - 1 %    Neutrophils Absolute 5 16 1 85 - 7 62 Thousands/µL    Immature Grans Absolute 0 03 0 00 - 0 20 Thousand/uL    Lymphocytes Absolute 1 03 0 60 - 4 47 Thousands/µL    Monocytes Absolute 0 88 0 17 - 1 22 Thousand/µL    Eosinophils Absolute 0 11 0 00 - 0 61 Thousand/µL    Basophils Absolute 0 01 0 00 - 0 10 Thousands/µL   Comprehensive metabolic panel   Result Value Ref Range    Sodium 135 135 - 147 mmol/L    Potassium 4 3 3 5 - 5 3 mmol/L    Chloride 100 96 - 108 mmol/L    CO2 28 21 - 32 mmol/L    ANION GAP 7 4 - 13 mmol/L    BUN 23 5 - 25 mg/dL    Creatinine 1 04 0 60 - 1 30 mg/dL    Glucose 164 (H) 65 - 140 mg/dL    Calcium 9 6 8 4 - 10 2 mg/dL    AST 19 13 - 39 U/L    ALT 20 7 - 52 U/L    Alkaline Phosphatase 47 34 - 104 U/L    Total Protein 6 9 6 4 - 8 4 g/dL    Albumin 4 3 3 5 - 5 0 g/dL    Total Bilirubin 0 82 0 20 - 1 00 mg/dL    eGFR 67 ml/min/1 73sq m   Lipase   Result Value Ref Range    Lipase 19 11 - 82 u/L   Protime-INR   Result Value Ref Range    Protime 13 0 11 6 - 14 5 seconds    INR 0 98 0 84 - 1 19   APTT   Result Value Ref Range    PTT 24 23 - 37 seconds   CBC   Result Value Ref Range    WBC 5 60 4 31 - 10 16 Thousand/uL    RBC 4 14 3 88 - 5 62 Million/uL    Hemoglobin 13 2 12 0 - 17 0 g/dL    Hematocrit 40 1 36 5 - 49 3 %    MCV 97 82 - 98 fL    MCH 31 9 26 8 - 34 3 pg    MCHC 32 9 31 4 - 37 4 g/dL    RDW 13 1 11 6 - 15 1 %    Platelets 674 (L) 829 - 390 Thousands/uL    MPV 10 8 8 9 - 12 7 fL   Comprehensive metabolic panel   Result Value Ref Range    Sodium 137 135 - 147 mmol/L    Potassium 4 0 3 5 - 5 3 mmol/L    Chloride 103 96 - 108 mmol/L    CO2 28 21 - 32 mmol/L    ANION GAP 6 4 - 13 mmol/L    BUN 16 5 - 25 mg/dL    Creatinine 0 95 0 60 - 1 30 mg/dL    Glucose 99 65 - 140 mg/dL    Calcium 8 9 8 4 - 10 2 mg/dL    AST 17 13 - 39 U/L    ALT 17 7 - 52 U/L    Alkaline Phosphatase 45 34 - 104 U/L    Total Protein 6 4 6 4 - 8 4 g/dL    Albumin 4 0 3 5 - 5 0 g/dL    Total Bilirubin 0 88 0 20 - 1 00 mg/dL    eGFR 75 ml/min/1 73sq m   ECG 12 lead   Result Value Ref Range    Ventricular Rate 56 BPM    Atrial Rate 56 BPM    WI Interval 164 ms    QRSD Interval 98 ms    QT Interval 434 ms    QTC Interval 419 ms    P Axis 76 degrees    QRS Axis 85 degrees    T Wave Axis 51 degrees   Tissue Exam   Result Value Ref Range    Case Report       Surgical Pathology Report                         Case: [id number]                                   Authorizing Provider:  Gerard Beltran MD          Collected:           06/22/2022 1357              Ordering Location:     [facility]        Received:            06/22/2022 [address] [place] [address] Emergency                                                                                  Department                                                                   Pathologist:           Alysia Rowe MD                                                         Specimens:   A) - Stomach, gastric polyp, hot snare                                                              B) - Stomach, gastric bx, r/o h pylori                                                     Final Diagnosis       A  Stomach polyp (biopsy):  - Fundic gland polyp  - Negative for dysplasia     B  Stomach (biopsy): - Stomach with no diagnostic abnormality  - No H pylori identified (H&E)  - No intestinal metaplasia     Interpretation performed at [facility], [address] [place] [address]        Additional Information       All reported additional testing was performed with appropriately reactive controls    These tests were developed and their performance characteristics determined by [facility] or appropriate performing facility, though some tests may be performed on tissues which have not been validated for performance characteristics (such as staining performed on alcohol exposed cell blocks and decalcified tissues)  Results should be interpreted with caution and in the context of the patients clinical condition  These tests may not be cleared or approved by the [place]  Food and Drug Administration, though the FDA has determined that such clearance or approval is not necessary  These tests are used for clinical purposes and they should not be regarded as investigational or for research  This laboratory has been approved by [facility], designated as a high-complexity laboratory and is qualified to perform these tests  Chanda Vogt Description          A  The specimen is received in formalin, labeled with the patient's name and hospital number, and is designated "gastric polyp  The specimen consists of 1 tan-pink, polypoid soft tissue fragment measuring 0 4 cm in greatest dimension  Entirely submitted  One screened cassette  B  The specimen is received in formalin, labeled with the patient's name and hospital number, and is designated "gastric biopsy  The specimen consists of 2 tan-pink, soft tissue fragments measuring 0 1 cm and 0 3 cm in greatest dimension  Entirely submitted  One screened cassette  Note: The estimated total formalin fixation time based upon information provided by the submitting clinician and the standard processing schedule is under 72 hours  MSequino               Imaging Studies:   Pathology, and Other Studies: I have personally reviewed pertinent reports     Reviewed test results and  discussed with patient and explained    Assessment and Plan:  Patient is here with his wife  He has been on acalabrutinib 100 mg twice a day for CLL and he  gets that from [facility]  Patient has been getting IVIG therapy 30 g once every 2 months  Patient has history of atrial fibrillation and that is 1 of the side about top acalabrutinib   No bleeding  He is not on blood thinner  He goes to see Dr Jj Damon at [facility]    He has history of mild asthma and sleep apnea and he uses CPAP at night and sleeps better with that  Much improved exertional dyspnea  Has some tiredness occasionally  He spends time in [place] and he continues to receive IVIG therapy from a hematologist in [place]      Physical examination and test results are as recorded and discussed in detail  No change in therapy,  Acalabrutinib and IVIG therapy      All discussed in detail  Questions answered  Discussed the importance of eating healthy foods and staying active as tolerated   Goal will be prolonged progression-free survival and safety of treatment  Patient is capable of self-care  Discussed precautions against coronavirus  Patient received Evusheld in [place] towards the end of May 2022 and he will have that again towards the end of November 2022    1  CLL (chronic lymphocytic leukemia) (HCC)    - CBC and differential; Future  - Comprehensive metabolic panel; Future  - IgG; Future  - CBC and differential; Standing  - Comprehensive metabolic panel; Standing  - CBC and differential  - Comprehensive metabolic panel    2  IgG deficiency (HCC)    - CBC and differential; Future  - Comprehensive metabolic panel; Future  - IgG; Future  - CBC and differential; Standing  - Comprehensive metabolic panel; Standing  - CBC and differential  - Comprehensive metabolic panel    3  Obstructive sleep apnea      4  Primary hypertension      5  CPAP (continuous positive airway pressure) dependence      6  History of atrial fibrillation      7  Hyperlipidemia, unspecified hyperlipidemia type      Blood work around 10/01/2022  Patient to continue taking acalabrutinib  He receives IVIG once every 8 weeks and is due again on 10/06/2022  Follow-up in 4 months  He will be due for 2nd dose of Evusheld towards the end of November 2022  He had 1st dose in [place] end of May 2022  CBC D and CMP every month        Patient voiced understanding and agreement in the discussion  Counseling / Coordination of Care    Frederick Mendes   Provided counseling and support

## 2022-08-17 NOTE — TELEPHONE ENCOUNTER
Spoke with patient's wife to go over updated infusion schedule  She is aware of all dates and will go over them with patient

## 2022-08-18 ENCOUNTER — TELEPHONE (OUTPATIENT)
Dept: HEMATOLOGY ONCOLOGY | Facility: CLINIC | Age: 82
End: 2022-08-18

## 2022-08-18 NOTE — TELEPHONE ENCOUNTER
CALL RETURN FORM   Reason for patient call? Patient would like to update Dr Bishop Srivastava on the actual date of his last Evushield injection  He states he gave the incorrect date at his visit  His last injection on  04/29    Patient's primary oncologist?  Dr Bishop Srivastava   Name of person the patient was calling for? Dr Bishop Srivastava   Any additional information to add, if applicable? N/A   Informed patient that the message will be forwarded to the team and someone will get back to them as soon as possible    Did you relay this information to the patient?  Yes

## 2022-08-18 NOTE — TELEPHONE ENCOUNTER
Returned telephone call spoke with Pt  Per Dr Bishop Srivastava ok to reschedule the injection from November to October  We will have infusion reschedule  Pt states he understands

## 2022-09-30 ENCOUNTER — APPOINTMENT (OUTPATIENT)
Dept: LAB | Facility: CLINIC | Age: 82
End: 2022-09-30
Payer: MEDICARE

## 2022-09-30 DIAGNOSIS — D80.3 IGG DEFICIENCY (HCC): ICD-10-CM

## 2022-09-30 DIAGNOSIS — C91.10 CLL (CHRONIC LYMPHOCYTIC LEUKEMIA) (HCC): ICD-10-CM

## 2022-09-30 LAB
ALBUMIN SERPL BCP-MCNC: 4.1 G/DL (ref 3.5–5)
ALP SERPL-CCNC: 61 U/L (ref 46–116)
ALT SERPL W P-5'-P-CCNC: 32 U/L (ref 12–78)
ANION GAP SERPL CALCULATED.3IONS-SCNC: 3 MMOL/L (ref 4–13)
AST SERPL W P-5'-P-CCNC: 21 U/L (ref 5–45)
BASOPHILS # BLD AUTO: 0.03 THOUSANDS/ΜL (ref 0–0.1)
BASOPHILS NFR BLD AUTO: 0 % (ref 0–1)
BILIRUB SERPL-MCNC: 0.71 MG/DL (ref 0.2–1)
BUN SERPL-MCNC: 20 MG/DL (ref 5–25)
CALCIUM SERPL-MCNC: 8.8 MG/DL (ref 8.3–10.1)
CHLORIDE SERPL-SCNC: 100 MMOL/L (ref 96–108)
CO2 SERPL-SCNC: 29 MMOL/L (ref 21–32)
CREAT SERPL-MCNC: 1.2 MG/DL (ref 0.6–1.3)
EOSINOPHIL # BLD AUTO: 0.07 THOUSAND/ΜL (ref 0–0.61)
EOSINOPHIL NFR BLD AUTO: 1 % (ref 0–6)
ERYTHROCYTE [DISTWIDTH] IN BLOOD BY AUTOMATED COUNT: 13.2 % (ref 11.6–15.1)
GFR SERPL CREATININE-BSD FRML MDRD: 57 ML/MIN/1.73SQ M
GLUCOSE SERPL-MCNC: 120 MG/DL (ref 65–140)
HCT VFR BLD AUTO: 43.3 % (ref 36.5–49.3)
HGB BLD-MCNC: 13.7 G/DL (ref 12–17)
IGG SERPL-MCNC: 578 MG/DL (ref 700–1600)
IMM GRANULOCYTES # BLD AUTO: 0.02 THOUSAND/UL (ref 0–0.2)
IMM GRANULOCYTES NFR BLD AUTO: 0 % (ref 0–2)
LYMPHOCYTES # BLD AUTO: 1.7 THOUSANDS/ΜL (ref 0.6–4.47)
LYMPHOCYTES NFR BLD AUTO: 21 % (ref 14–44)
MCH RBC QN AUTO: 30 PG (ref 26.8–34.3)
MCHC RBC AUTO-ENTMCNC: 31.6 G/DL (ref 31.4–37.4)
MCV RBC AUTO: 95 FL (ref 82–98)
MONOCYTES # BLD AUTO: 0.92 THOUSAND/ΜL (ref 0.17–1.22)
MONOCYTES NFR BLD AUTO: 11 % (ref 4–12)
NEUTROPHILS # BLD AUTO: 5.4 THOUSANDS/ΜL (ref 1.85–7.62)
NEUTS SEG NFR BLD AUTO: 67 % (ref 43–75)
NRBC BLD AUTO-RTO: 0 /100 WBCS
PLATELET # BLD AUTO: 201 THOUSANDS/UL (ref 149–390)
PMV BLD AUTO: 11.6 FL (ref 8.9–12.7)
POTASSIUM SERPL-SCNC: 4.4 MMOL/L (ref 3.5–5.3)
PROT SERPL-MCNC: 6.8 G/DL (ref 6.4–8.4)
RBC # BLD AUTO: 4.56 MILLION/UL (ref 3.88–5.62)
SODIUM SERPL-SCNC: 132 MMOL/L (ref 135–147)
WBC # BLD AUTO: 8.14 THOUSAND/UL (ref 4.31–10.16)

## 2022-09-30 PROCEDURE — 36415 COLL VENOUS BLD VENIPUNCTURE: CPT

## 2022-09-30 PROCEDURE — 80053 COMPREHEN METABOLIC PANEL: CPT

## 2022-09-30 PROCEDURE — 85025 COMPLETE CBC W/AUTO DIFF WBC: CPT

## 2022-09-30 PROCEDURE — 82784 ASSAY IGA/IGD/IGG/IGM EACH: CPT

## 2022-10-06 ENCOUNTER — HOSPITAL ENCOUNTER (OUTPATIENT)
Dept: INFUSION CENTER | Facility: CLINIC | Age: 82
Discharge: HOME | End: 2022-10-06
Payer: MEDICARE

## 2022-10-06 VITALS
TEMPERATURE: 97.2 F | WEIGHT: 165.5 LBS | RESPIRATION RATE: 18 BRPM | HEART RATE: 60 BPM | OXYGEN SATURATION: 97 % | DIASTOLIC BLOOD PRESSURE: 62 MMHG | BODY MASS INDEX: 23.75 KG/M2 | SYSTOLIC BLOOD PRESSURE: 128 MMHG

## 2022-10-06 DIAGNOSIS — C91.10 CLL (CHRONIC LYMPHOCYTIC LEUKEMIA) (HCC): Primary | ICD-10-CM

## 2022-10-06 DIAGNOSIS — D80.3 IGG DEFICIENCY (HCC): ICD-10-CM

## 2022-10-06 PROCEDURE — 96375 TX/PRO/DX INJ NEW DRUG ADDON: CPT

## 2022-10-06 PROCEDURE — 96365 THER/PROPH/DIAG IV INF INIT: CPT

## 2022-10-06 PROCEDURE — 96366 THER/PROPH/DIAG IV INF ADDON: CPT

## 2022-10-06 RX ORDER — DIPHENHYDRAMINE HCL 25 MG
12.5 TABLET ORAL ONCE
Status: DISCONTINUED | OUTPATIENT
Start: 2022-10-06 | End: 2022-10-09 | Stop reason: HOSPADM

## 2022-10-06 RX ORDER — ACETAMINOPHEN 325 MG/1
650 TABLET ORAL ONCE
OUTPATIENT
Start: 2022-12-01

## 2022-10-06 RX ORDER — DIPHENHYDRAMINE HCL 25 MG
12.5 TABLET ORAL ONCE
OUTPATIENT
Start: 2022-12-01

## 2022-10-06 RX ORDER — ACETAMINOPHEN 325 MG/1
650 TABLET ORAL ONCE
Status: DISCONTINUED | OUTPATIENT
Start: 2022-10-06 | End: 2022-10-09 | Stop reason: HOSPADM

## 2022-10-06 RX ORDER — SODIUM CHLORIDE 9 MG/ML
20 INJECTION, SOLUTION INTRAVENOUS ONCE
Status: COMPLETED | OUTPATIENT
Start: 2022-10-06 | End: 2022-10-06

## 2022-10-06 RX ORDER — SODIUM CHLORIDE 9 MG/ML
20 INJECTION, SOLUTION INTRAVENOUS ONCE
OUTPATIENT
Start: 2022-12-01

## 2022-10-06 RX ADMIN — Medication 30 G: at 09:27

## 2022-10-06 RX ADMIN — HYDROCORTISONE SODIUM SUCCINATE 100 MG: 100 INJECTION, POWDER, FOR SOLUTION INTRAMUSCULAR; INTRAVENOUS at 09:02

## 2022-10-06 RX ADMIN — SODIUM CHLORIDE 20 ML/HR: 0.9 INJECTION, SOLUTION INTRAVENOUS at 09:02

## 2022-10-06 NOTE — PROGRESS NOTES
Patient arrives to infusion center for IVIG today  Patient took Benadryl and Tylenol PTA, patient aware he is able to do that, but we do have orders for same here if needed  VSS, PIV inserted without issue  Solu-Cortef administered as per orders  Patient resting on recliner chair, call bell within reach

## 2022-10-06 NOTE — PROGRESS NOTES
Patient tolerated infusion without complications  Patient aware of next appointment   Patient  provided appointment list

## 2022-10-31 ENCOUNTER — HOSPITAL ENCOUNTER (OUTPATIENT)
Dept: INFUSION CENTER | Facility: CLINIC | Age: 82
Discharge: HOME | End: 2022-10-31

## 2022-10-31 VITALS
OXYGEN SATURATION: 96 % | TEMPERATURE: 97.2 F | SYSTOLIC BLOOD PRESSURE: 134 MMHG | DIASTOLIC BLOOD PRESSURE: 60 MMHG | HEART RATE: 59 BPM | RESPIRATION RATE: 18 BRPM

## 2022-10-31 DIAGNOSIS — C91.10 CLL (CHRONIC LYMPHOCYTIC LEUKEMIA) (HCC): Primary | ICD-10-CM

## 2022-10-31 DIAGNOSIS — D80.3 IGG DEFICIENCY (HCC): ICD-10-CM

## 2022-10-31 RX ADMIN — AZD7442 600 MG COMBINED: KIT at 15:09

## 2022-10-31 NOTE — PROGRESS NOTES
Pt presents to [facility] with no complaints  Vitals stable  Pt aware Evusheld is not FDA approved and gives verbal consent to continue with treatment today  Shots given in L&R buttock without issue  Pt aware to stay for 1 hr observation as per order  Callbell within reach

## 2022-11-07 NOTE — PRE-PROCEDURE INSTRUCTIONS
Pre-Surgery Instructions:   Medication Instructions   • Acalabrutinib 100 MG CAPS Take day of surgery  • albuterol (ProAir HFA) 90 mcg/act inhaler Uses PRN- OK to take day of surgery   • amoxicillin (AMOXIL) 500 mg capsule Instructions provided by MD   • Arnuity Ellipta 100 MCG/ACT AEPB inhaler Take day of surgery  • buPROPion (WELLBUTRIN XL) 300 mg 24 hr tablet Take day of surgery  • immune globulin, human (Bivigam) 5 GM/50ML Instructions provided by MD   • lisinopril (ZESTRIL) 20 mg tablet Hold day of surgery  • metoprolol succinate (TOPROL-XL) 25 mg 24 hr tablet Take day of surgery  • Multiple Vitamins-Minerals (PRESERVISION AREDS 2 PO) Hold day of surgery  • rosuvastatin (CRESTOR) 20 MG tablet Hold day of surgery  • tadalafil (CIALIS) 5 MG tablet Hold day of surgery  Pre op instructions reviewed with pt via phone  Instructed to take metoprolol, arnuity inhaler, wellbutrin and calquence a m of surgery   wife Rosy Samuels [phone]  My Surgical Experience    The following information was developed to assist you to prepare for your operation  What do I need to do before coming to the hospital?  • Arrange for a responsible person to drive you to and from the hospital   • Arrange care for your children at home  Children are not allowed in the recovery areas of the hospital  • Plan to wear clothing that is easy to put on and take off  If you are having shoulder surgery, wear a shirt that buttons or zippers in the front  Bathing  o Shower the evening before and the morning of your surgery with an antibacterial soap  Please refer to the Pre Op Showering Instructions for Surgery Patients Sheet   o Remove nail polish and all body piercing jewelry  o Do not shave any body part for at least 24 hours before surgery-this includes face, arms, legs and upper body  Food  o Nothing to eat or drink after midnight the night before your surgery   This includes candy and chewing gum  o Exception: If your surgery is after 12:00pm (noon), you may have clear liquids such as 7-Up®, ginger ale, apple or cranberry juice, Jell-O®, water, or clear broth until 8:00 am  o Do not drink milk or juice with pulp on the morning before surgery  o Do not drink alcohol 24 hours before surgery  Medicine  o Follow instructions you received from your surgeon about which medicines you may take on the day of surgery  o If instructed to take medicine on the morning of surgery, take pills with just a small sip of water  Call your prescribing doctor for specific information on what to do if you take insulin    What should I bring to the hospital?    Bring:  • Crutches or a walker, if you have them, for foot or knee surgery  • A list of the daily medicines, vitamins, minerals, herbals and nutritional supplements you take  Include the dosages of medicines and the time you take them each day  • Glasses, dentures or hearing aids  • Minimal clothing; you will be wearing hospital sleepwear  • Photo ID; required to verify your identity  • If you have a Living Will or Power of , bring a copy of the documents  • If you have an ostomy, bring an extra pouch and any supplies you use    Do not bring  • Medicines or inhalers  • Money, valuables or jewelry    What other information should I know about the day of surgery? • Notify your surgeons if you develop a cold, sore throat, cough, fever, rash or any other illness  • Report to the Ambulatory Surgical/Same Day Surgery Unit  • You will be instructed to stop at Registration only if you have not been pre-registered  • Inform your  fi they do not stay that they will be asked by the staff to leave a phone number where they can be reached  • Be available to be reached before surgery   In the event the operating room schedule changes, you may be asked to come in earlier or later than expected    *It is important to tell your doctor and others involved in your health care if you are taking or have been taking any non-prescription drugs, vitamins, minerals, herbals or other nutritional supplements   Any of these may interact with some food or medicines and cause a reaction

## 2022-11-14 ENCOUNTER — ANESTHESIA (OUTPATIENT)
Dept: PERIOP | Facility: AMBULARY SURGERY CENTER | Age: 82
End: 2022-11-14

## 2022-11-14 ENCOUNTER — ANESTHESIA EVENT (OUTPATIENT)
Dept: PERIOP | Facility: AMBULARY SURGERY CENTER | Age: 82
End: 2022-11-14

## 2022-11-14 ENCOUNTER — HOSPITAL ENCOUNTER (OUTPATIENT)
Facility: AMBULARY SURGERY CENTER | Age: 82
Setting detail: OUTPATIENT SURGERY
Discharge: HOME | End: 2022-11-14
Attending: OPHTHALMOLOGY | Admitting: OPHTHALMOLOGY

## 2022-11-14 VITALS
OXYGEN SATURATION: 96 % | SYSTOLIC BLOOD PRESSURE: 152 MMHG | RESPIRATION RATE: 16 BRPM | TEMPERATURE: 98.2 F | DIASTOLIC BLOOD PRESSURE: 72 MMHG | HEART RATE: 63 BPM

## 2022-11-14 DIAGNOSIS — H25.11 AGE-RELATED NUCLEAR CATARACT OF RIGHT EYE: Primary | ICD-10-CM

## 2022-11-14 DEVICE — ACRYSOF(R) IQ ASPHERIC NATURAL IOL, SINGLE-PIECE ACRYLIC FOLDABLE PCL, UV WITH BLUE LIGHTFILTER, 13.0MM LENGTH, 6.0MM ANTERIORASYMMETRIC BICONVEX OPTIC, PLANAR HAPTICS.
Type: IMPLANTABLE DEVICE | Site: EYE | Status: FUNCTIONAL
Brand: ACRYSOF®

## 2022-11-14 RX ORDER — GATIFLOXACIN 5 MG/ML
SOLUTION/ DROPS OPHTHALMIC AS NEEDED
Status: DISCONTINUED | OUTPATIENT
Start: 2022-11-14 | End: 2022-11-14 | Stop reason: HOSPADM

## 2022-11-14 RX ORDER — CYCLOPENTOLATE HYDROCHLORIDE 10 MG/ML
1 SOLUTION/ DROPS OPHTHALMIC
Status: ACTIVE | OUTPATIENT
Start: 2022-11-14 | End: 2022-11-14

## 2022-11-14 RX ORDER — TETRACAINE HYDROCHLORIDE 5 MG/ML
SOLUTION OPHTHALMIC AS NEEDED
Status: DISCONTINUED | OUTPATIENT
Start: 2022-11-14 | End: 2022-11-14 | Stop reason: HOSPADM

## 2022-11-14 RX ORDER — MIDAZOLAM HYDROCHLORIDE 2 MG/2ML
INJECTION, SOLUTION INTRAMUSCULAR; INTRAVENOUS AS NEEDED
Status: DISCONTINUED | OUTPATIENT
Start: 2022-11-14 | End: 2022-11-14

## 2022-11-14 RX ORDER — PHENYLEPHRINE HCL 2.5 %
1 DROPS OPHTHALMIC (EYE)
Status: ACTIVE | OUTPATIENT
Start: 2022-11-14 | End: 2022-11-14

## 2022-11-14 RX ORDER — KETOROLAC TROMETHAMINE 5 MG/ML
1 SOLUTION OPHTHALMIC
Status: ACTIVE | OUTPATIENT
Start: 2022-11-14 | End: 2022-11-14

## 2022-11-14 RX ORDER — GATIFLOXACIN 5 MG/ML
1 SOLUTION/ DROPS OPHTHALMIC 2 TIMES DAILY
Qty: 3 ML | Refills: 0
Start: 2022-11-14

## 2022-11-14 RX ORDER — TETRACAINE HYDROCHLORIDE 5 MG/ML
1 SOLUTION OPHTHALMIC ONCE
Status: COMPLETED | OUTPATIENT
Start: 2022-11-14 | End: 2022-11-14

## 2022-11-14 RX ORDER — KETOROLAC TROMETHAMINE 5 MG/ML
1 SOLUTION OPHTHALMIC 4 TIMES DAILY
Qty: 5 ML | Refills: 0
Start: 2022-11-14

## 2022-11-14 RX ORDER — LIDOCAINE HYDROCHLORIDE 10 MG/ML
INJECTION, SOLUTION EPIDURAL; INFILTRATION; INTRACAUDAL; PERINEURAL AS NEEDED
Status: DISCONTINUED | OUTPATIENT
Start: 2022-11-14 | End: 2022-11-14 | Stop reason: HOSPADM

## 2022-11-14 RX ORDER — LIDOCAINE HYDROCHLORIDE 20 MG/ML
1 JELLY TOPICAL
Status: COMPLETED | OUTPATIENT
Start: 2022-11-14 | End: 2022-11-14

## 2022-11-14 RX ORDER — BALANCED SALT SOLUTION 6.4; .75; .48; .3; 3.9; 1.7 MG/ML; MG/ML; MG/ML; MG/ML; MG/ML; MG/ML
SOLUTION OPHTHALMIC AS NEEDED
Status: DISCONTINUED | OUTPATIENT
Start: 2022-11-14 | End: 2022-11-14 | Stop reason: HOSPADM

## 2022-11-14 RX ADMIN — KETOROLAC TROMETHAMINE 1 DROP: 5 SOLUTION OPHTHALMIC at 11:17

## 2022-11-14 RX ADMIN — LIDOCAINE HYDROCHLORIDE 1 APPLICATION: 20 JELLY TOPICAL at 11:17

## 2022-11-14 RX ADMIN — KETOROLAC TROMETHAMINE 1 DROP: 5 SOLUTION OPHTHALMIC at 10:50

## 2022-11-14 RX ADMIN — LIDOCAINE HYDROCHLORIDE 1 APPLICATION: 20 JELLY TOPICAL at 10:36

## 2022-11-14 RX ADMIN — CYCLOPENTOLATE HYDROCHLORIDE 1 DROP: 10 SOLUTION/ DROPS OPHTHALMIC at 10:36

## 2022-11-14 RX ADMIN — CYCLOPENTOLATE HYDROCHLORIDE 1 DROP: 10 SOLUTION/ DROPS OPHTHALMIC at 11:17

## 2022-11-14 RX ADMIN — PHENYLEPHRINE HYDROCHLORIDE 1 DROP: 25 SOLUTION/ DROPS OPHTHALMIC at 11:17

## 2022-11-14 RX ADMIN — KETOROLAC TROMETHAMINE 1 DROP: 5 SOLUTION OPHTHALMIC at 10:36

## 2022-11-14 RX ADMIN — PHENYLEPHRINE HYDROCHLORIDE 1 DROP: 25 SOLUTION/ DROPS OPHTHALMIC at 10:50

## 2022-11-14 RX ADMIN — CYCLOPENTOLATE HYDROCHLORIDE 1 DROP: 10 SOLUTION/ DROPS OPHTHALMIC at 10:50

## 2022-11-14 RX ADMIN — LIDOCAINE HYDROCHLORIDE 1 APPLICATION: 20 JELLY TOPICAL at 10:50

## 2022-11-14 RX ADMIN — TETRACAINE HYDROCHLORIDE 1 DROP: 5 SOLUTION OPHTHALMIC at 10:36

## 2022-11-14 RX ADMIN — PHENYLEPHRINE HYDROCHLORIDE 1 DROP: 25 SOLUTION/ DROPS OPHTHALMIC at 10:36

## 2022-11-14 RX ADMIN — MIDAZOLAM 2 MG: 1 INJECTION INTRAMUSCULAR; INTRAVENOUS at 11:45

## 2022-11-14 NOTE — ANESTHESIA PREPROCEDURE EVALUATION
Procedure:  EXTRACTION EXTRACAPSULAR CATARACT PHACO INTRAOCULAR LENS (IOL) (Right Eye)    Relevant Problems   CARDIO   (+) Hyperlipidemia   (+) Hypertension      GI/HEPATIC   (+) Hematemesis      NEURO/PSYCH   (+) History of atrial fibrillation      PULMONARY   (+) Mild intermittent asthma without complication   (+) Obstructive sleep apnea      Other   (+) CLL (chronic lymphocytic leukemia) (HCC)        Physical Exam    Airway    Mallampati score: II  TM Distance: >3 FB  Neck ROM: full     Dental   No notable dental hx     Cardiovascular      Pulmonary      Other Findings        Anesthesia Plan  ASA Score- 3     Anesthesia Type- IV sedation with anesthesia with ASA Monitors  Additional Monitors:   Airway Plan:           Plan Factors-Exercise tolerance (METS): >4 METS  Chart reviewed  Existing labs reviewed  Patient summary reviewed  Patient is not a current smoker  Induction- intravenous  Postoperative Plan-     Informed Consent- Anesthetic plan and risks discussed with patient  I personally reviewed this patient with the CRNA  Discussed and agreed on the Anesthesia Plan with the CRNA  Aryan Diego

## 2022-11-14 NOTE — PERIOPERATIVE NURSING NOTE
Discharge instructions reviewed with pt, verbalizes understanding  Please call pt and assist her in scheduling a separate appt for Nexplanon removal. This unfortunately cannot be done at annual visit. Thanks.

## 2022-11-14 NOTE — DISCHARGE INSTRUCTIONS
Dr Shelia Watson Cataract Instructions    Activity:     1  No Driving until instructed   2  Keep shield on until seen tomorrow except when administering drops   3  No heavy lifting over 30lbs   4  No water in eye for 1 week  Diet:     1  Resume normal diet    Normal Symptoms:     1  Mild Headache   2  Scratchy or picky feeling around eye    Call the office if:     1  You have any questions or concerns   2  If eye pain is not relieved by extra strength tylenol    Office phone number:  [phone]      Next appointment:     1  See Dr Shelia Watson at his office tomorrow as scheduled   __________________10am________________________________________   2  Bring blue eye kit with you and eyedrops to the office    A new set of comprehensive instructions will be given and reviewed with you during your office visit tomorrow

## 2022-11-14 NOTE — ANESTHESIA POSTPROCEDURE EVALUATION
Post-Op Assessment Note    CV Status:  Stable  Pain Score: 0    Pain management: adequate     Mental Status:  Alert and awake   Hydration Status:  Stable and euvolemic   PONV Controlled:  Controlled   Airway Patency:  Patent and adequate      Post Op Vitals Reviewed: Yes      Staff: CRNA         No complications documented      BP   148/70   Temp      Pulse  52   Resp   20   SpO2   99

## 2022-11-14 NOTE — OP NOTE
OPERATIVE REPORT    PATIENT NAME: Carey Dias    :  1940  MRN: [id number]  Pt Location: [facility] OR ROOM 01    Surgery Date: 2022    Surgeon(s) and Role:     * Melissa Martin MD - Primary    Age-related nuclear cataract, right eye [H25 11]    Post-Op Diagnosis Codes:     * Age-related nuclear cataract, right eye [H25 11]    Procedure(s):  EXTRACTION EXTRACAPSULAR CATARACT PHACO INTRAOCULAR LENS (IOL)    Anesthesia Type:   IV Sedation with Anesthesia    Operative Indications:  Age-related nuclear cataract, right eye [H25 11]  Decreased vision to 20/50  With problems reading  Pt requested cataract sx the right eye    Procedure and Technique:    Procedure Details     The patient was brought in the OR in stable condition and placed on the operative table  The right eye was prepped and draped in the usual sterile manner  Attention was directed to the right eye where a lid speculum was placed  A 2 4 mm clear corneal incision was made temperally  1/2 cc of 1% MPF Lidocaine was irrigated into the anterior chamber followed by viscoat  A posterior synechiae at 12:00 was broken with an iris spatula  More viscoat was added to push the synechiae apart  The side port incision was placed superiorly  The capsularrhexis was made and the nucleus was hydrodissected with BSS  The nucleus was then removed with the phaco handpiece followed by removal of the cortical material with the I/A handpiece  The capsular bag was then filled with Provisc  The IOL was folded and placed in to the capsular bag and centered well  The remaining Provisc was removed from the eye with the I/A  The wounds were hydrated with BSS and found to be water tight  The lid speculum was removed and 2 drops of Gatifloxicin were placed over the cornea  A protective eye shield was taped over the eye and the patient went to PACU in stable condition  I will see the patient in the office tomorrow and the expected post op period is a few weeks  Complications: None        Disposition: PACU   Condition: Stable    SIGNATURE: Jing Hughes MD  DATE: November 14, 2022  TIME: 12:07 PM

## 2022-11-22 ENCOUNTER — TELEPHONE (OUTPATIENT)
Dept: HEMATOLOGY ONCOLOGY | Facility: CLINIC | Age: 82
End: 2022-11-22

## 2022-11-22 NOTE — TELEPHONE ENCOUNTER
I left a message for Sulema Pryor asking if he would be keeping his appointment with Dr Earl Chanel   I wanted to confirm that [place] travels out of [place] and would call the office back to make his follow up with Dr Srinath Ernst, but would be making his infusion appt

## 2022-11-29 NOTE — TELEPHONE ENCOUNTER
I tried calling and I left a message again asking if Louie Spencer would want to see Dr Maggie Ruffin  I believe he travels for the winter and if that is the case we can see him when he comes back, otherwise his appointment will have to be rescheduled since Dr Maggie Ruffin  is out of the office that day  I will try one more time to reach out tbefore I cancel the appointment

## 2022-12-01 ENCOUNTER — HOSPITAL ENCOUNTER (OUTPATIENT)
Dept: INFUSION CENTER | Facility: CLINIC | Age: 82
Discharge: HOME | End: 2022-12-01

## 2022-12-01 VITALS
RESPIRATION RATE: 18 BRPM | HEART RATE: 86 BPM | WEIGHT: 167 LBS | BODY MASS INDEX: 23.96 KG/M2 | TEMPERATURE: 97 F | OXYGEN SATURATION: 99 %

## 2022-12-01 DIAGNOSIS — C91.10 CLL (CHRONIC LYMPHOCYTIC LEUKEMIA) (HCC): Primary | ICD-10-CM

## 2022-12-01 DIAGNOSIS — D80.3 IGG DEFICIENCY (HCC): ICD-10-CM

## 2022-12-01 RX ORDER — SODIUM CHLORIDE 9 MG/ML
20 INJECTION, SOLUTION INTRAVENOUS ONCE
Status: COMPLETED | OUTPATIENT
Start: 2022-12-01 | End: 2022-12-01

## 2022-12-01 RX ORDER — DIPHENHYDRAMINE HCL 25 MG
12.5 TABLET ORAL ONCE
OUTPATIENT
Start: 2023-01-26

## 2022-12-01 RX ORDER — DIPHENHYDRAMINE HCL 25 MG
12.5 TABLET ORAL ONCE
Status: DISCONTINUED | OUTPATIENT
Start: 2022-12-01 | End: 2022-12-04 | Stop reason: HOSPADM

## 2022-12-01 RX ORDER — ACETAMINOPHEN 325 MG/1
650 TABLET ORAL ONCE
Status: DISCONTINUED | OUTPATIENT
Start: 2022-12-01 | End: 2022-12-04 | Stop reason: HOSPADM

## 2022-12-01 RX ORDER — ACETAMINOPHEN 325 MG/1
650 TABLET ORAL ONCE
OUTPATIENT
Start: 2023-01-26

## 2022-12-01 RX ORDER — SODIUM CHLORIDE 9 MG/ML
20 INJECTION, SOLUTION INTRAVENOUS ONCE
OUTPATIENT
Start: 2023-01-26

## 2022-12-01 RX ADMIN — HYDROCORTISONE SODIUM SUCCINATE 100 MG: 100 INJECTION, POWDER, FOR SOLUTION INTRAMUSCULAR; INTRAVENOUS at 08:32

## 2022-12-01 RX ADMIN — Medication 30 G: at 09:06

## 2022-12-01 RX ADMIN — SODIUM CHLORIDE 20 ML/HR: 0.9 INJECTION, SOLUTION INTRAVENOUS at 08:33

## 2022-12-01 NOTE — PROGRESS NOTES
Patient tolerated IVIG infusion without incident  Peripheral IV removed  Patient [place] next [place]  AVS printed and given to patient

## 2022-12-01 NOTE — PROGRESS NOTES
Patient presents today for IVIG infusion  Patient offers no complaints  VSS  Peripheral IV inserted without incident

## 2022-12-16 ENCOUNTER — OFFICE VISIT (OUTPATIENT)
Dept: PULMONOLOGY | Facility: MEDICAL CENTER | Age: 82
End: 2022-12-16

## 2022-12-16 VITALS
DIASTOLIC BLOOD PRESSURE: 68 MMHG | RESPIRATION RATE: 12 BRPM | BODY MASS INDEX: 25.46 KG/M2 | SYSTOLIC BLOOD PRESSURE: 136 MMHG | HEIGHT: 68 IN | WEIGHT: 168 LBS | HEART RATE: 94 BPM | TEMPERATURE: 98.8 F | OXYGEN SATURATION: 95 %

## 2022-12-16 DIAGNOSIS — G47.33 OBSTRUCTIVE SLEEP APNEA: Chronic | ICD-10-CM

## 2022-12-16 DIAGNOSIS — C91.10 CLL (CHRONIC LYMPHOCYTIC LEUKEMIA) (HCC): ICD-10-CM

## 2022-12-16 DIAGNOSIS — J45.20 MILD INTERMITTENT ASTHMA WITHOUT COMPLICATION: Primary | ICD-10-CM

## 2022-12-16 RX ORDER — PREDNISOLONE ACETATE 10 MG/ML
SUSPENSION/ DROPS OPHTHALMIC
COMMUNITY
Start: 2022-11-25

## 2022-12-16 RX ORDER — ATROPINE SULFATE 10 MG/ML
SOLUTION/ DROPS OPHTHALMIC
COMMUNITY
Start: 2022-10-24

## 2022-12-16 NOTE — PROGRESS NOTES
Answers for HPI/ROS submitted by the patient on 12/11/2022  Do you have shortness of breath that occurs with effort or exertion?: No  Do you have ear congestion?: No  Do you have heartburn?: No  Do you have fatigue?: No  Do you have nasal congestion?: No  Do you have shortness of breath when lying flat?: No  Do you have shortness of breath when you wake up?: No  Do you have sweats?: No  Have you experienced weight loss?: No  Which of the following makes your symptoms worse?: climbing stairs        Assessment/Plan        Problem List Items Addressed This Visit        Respiratory    Obstructive sleep apnea (Chronic)     Moderate STEFANY  He has missed using his CPAP several nights  He is on an auto CPAP set at a pressure range of 12 to 18 6 cm of water  He uses the CPAP on average 4 5 hours of usage and resultant AHI was minimally elevated 6 8  Did not have much of the mask leak  He will continue with the AirFit F 20 foam full mask and will try to use CPAP on a regular basis  The company is [address]  He has a [employer] Airview auto CPAP machine  Mild intermittent asthma without complication - Primary     Continue Arnuity 100 mcg 1 puff daily use albuterol inhaler 2 puffs when needed  Wheezing on examination today  Other    CLL (chronic lymphocytic leukemia) ([address] )     Mynor is on Calquence (acalabrutinib) for his CLL and his white blood cell count is normal   Blood work from November 28 was reviewed and white blood cell count was 7 9 with hemoglobin 12 5 and platelet count 767  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 and does receive IV gammaglobulin infusion every 8 weeks  Follow-up and Sleep Apnea, doing okay          SEVERIANO Reddyletha Began does follow with hematologist Dr Alannah Khan at [facility] for chronic lymphocytic leukemia and last visit there was on November 28  He does receive IV gammaglobulin every 8 weeks as he does have some hypogammaglobinemia    He also is on acalabrutinib for his CLL  Mynor has mild intermittent asthma and is on Arnuity 100 mcg 1 puff daily  He only occasionally needs to use his albuterol inhaler  Ocasionally when he turns from his side to supine during sleep he will have some transient shortness of breath  He has moderate STEFANY and has been missing some nights usng his CPAP machine  He is on AutoSet PAP with pressure setting of 10 to 18 cm water  He used an AirFit F 20 foam fullface mask which she likes  In the past he had been on imbruvics for his CLL but because of paroxysmal atrial fibrillation he was then switched to Calquence (acalabrutinib)  He has remained in normal sinus rhythm  He was accompanied by his wife Gwendolyn Hernandez today  Past Medical History:   Diagnosis Date   • Arthritis    • BPH (benign prostatic hyperplasia)    • Cancer (HCC)    • Chronic lymphocytic leukemia (CLL), B-cell (HCC)    • CPAP (continuous positive airway pressure) dependence    • Depression    • Hearing aid worn     bilateral   • Hyperlipidemia     controlled   • Hypertension     controlled   • Sinusitis    • Sleep apnea     CPAP   • Tinnitus    • Wears glasses        Past Surgical History:   Procedure Laterality Date   • BACK SURGERY  09/2017    laminotomy L3,4 S1-   • CARPAL TUNNEL RELEASE Bilateral    • CATARACT EXTRACTION      left   • CATARACT EXTRACTION W/ INTRAOCULAR LENS IMPLANT Left 12/11/2017    Procedure: EXTRACTION EXTRACAPSULAR CATARACT PHACO INTRAOCULAR LENS (IOL);   Surgeon: Erma Lomax MD;  Location: [facility] MAIN OR;  Service: Ophthalmology   • CERVICAL DISCECTOMY  1982    C5-6   • COLONOSCOPY     • HERNIA REPAIR      l inguinal   • IR LOWER EXTREMITY / INTERVENTION  12/2/2019   • NJ EXC SKIN MALIG 1 1-2 CM FACE,FACIAL Right 1/11/2022    Procedure: EXCISION SKIN LESION CHEEK WITH FROZEN SECTION;  Surgeon: Aicha Barroso MD;  Location: [address] [place] [address];  Service: Plastics   • NJ XCAPSL CTRC RMVL INSJ IO LENS PROSTH W/O ECP Right 11/14/2022 Procedure: EXTRACTION EXTRACAPSULAR CATARACT PHACO INTRAOCULAR LENS (IOL);   Surgeon: Megha Owens MD;  Location: [facility] MAIN OR;  Service: Ophthalmology   • ROTATOR CUFF REPAIR Left 2006   • TONSILLECTOMY           Current Outpatient Medications:   •  Acalabrutinib 100 MG CAPS, Take 1 capsule by mouth 2 (two) times a day, Disp: , Rfl:   •  albuterol (ProAir HFA) 90 mcg/act inhaler, Inhale 2 puffs every 4 (four) hours as needed for wheezing, Disp: 8 5 g, Rfl: 6  •  atropine (ISOPTO ATROPINE) 1 % ophthalmic solution, INSTILL 1 DROP IN THE RIGHT EYE THREE TIMES DAILY, Disp: , Rfl:   •  buPROPion (WELLBUTRIN XL) 300 mg 24 hr tablet, Take 300 mg by mouth every morning, Disp: , Rfl:   •  immune globulin, human (Bivigam) 5 GM/50ML, 15mg IV every 8 weeks, Disp: , Rfl:   •  lisinopril (ZESTRIL) 20 mg tablet, Take 20 mg by mouth daily Taking 1 qd, Disp: , Rfl:   •  metoprolol succinate (TOPROL-XL) 25 mg 24 hr tablet, Take 25 mg by mouth every morning Takes 1 1/12 a m  , Disp: , Rfl:   •  Multiple Vitamins-Minerals (PRESERVISION AREDS 2 PO), Take by mouth 2 (two) times a day, Disp: , Rfl:   •  prednisoLONE acetate (PRED FORTE) 1 % ophthalmic suspension, INSTILL 1 DROP IN THE RIGHT EYE FOUR TIMES DAILY, Disp: , Rfl:   •  rosuvastatin (CRESTOR) 20 MG tablet, , Disp: , Rfl:   •  tadalafil (CIALIS) 5 MG tablet, daily at bedtime, Disp: , Rfl:   •  amoxicillin (AMOXIL) 500 mg capsule, TAKE 4 CAPSULES BY MOUTH ONE HOUR PRIOR TO DENTAL APPOINTMENT, Disp: , Rfl:   •  Arnuity Ellipta 100 MCG/ACT AEPB inhaler, Inhale 1 puff daily Rinse mouth after use , Disp: 1 blister, Rfl: 6  •  gatifloxacin (ZYMAXID) 0 5 %, Administer 1 drop to the right eye 2 (two) times a day, Disp: 3 mL, Rfl: 0  •  ketorolac (ACULAR) 0 5 % ophthalmic solution, Administer 1 drop to the right eye 4 (four) times a day, Disp: 5 mL, Rfl: 0    Allergies   Allergen Reactions   • Rocephin [Ceftriaxone]      Avoids d/t previous liver toxicity       Social History Tobacco Use   • Smoking status: Former     Packs/day: 1 00     Years: 20 00     Pack years: 20 00     Types: Cigarettes     Quit date: 5     Years since quittin 9   • Smokeless tobacco: Never   Substance Use Topics   • Alcohol use: Yes     Alcohol/week: 7 0 standard drinks     Types: 7 Glasses of wine per week     Comment: occ         Family History   Problem Relation Age of Onset   • Cancer Mother         bladder   • Heart disease Father    • Stroke Father    • Parkinsonism Brother        Review of Systems   Constitutional: Negative for appetite change and fever  HENT: Negative for ear pain, postnasal drip, rhinorrhea, sneezing, sore throat and trouble swallowing  Eyes: Negative for redness  Respiratory: Negative for shortness of breath  Cardiovascular: Negative for chest pain  Genitourinary: Negative for hematuria  Musculoskeletal: Positive for myalgias  Neurological: Negative for headaches  Psychiatric/Behavioral: Negative for decreased concentration  Vitals:    22 1334   BP: 136/68   Pulse: 94   Resp: 12   Temp: 98 8 °F (37 1 °C)   SpO2: 95%     Height: 5' 8" (172 7 cm)  IBW (Ideal Body Weight): 68 4 kg  Body mass index is 25 54 kg/m²  Weight (last 2 days)     Date/Time Weight    22 1334 76 2 (168)              Physical Exam  Vitals reviewed  Constitutional:       General: He is not in acute distress  Appearance: Normal appearance  He is well-developed  HENT:      Head: Normocephalic  Right Ear: External ear normal       Left Ear: External ear normal       Nose: Nose normal       Mouth/Throat:      Mouth: Mucous membranes are moist       Pharynx: Oropharynx is clear  No oropharyngeal exudate  Eyes:      Conjunctiva/sclera: Conjunctivae normal       Pupils: Pupils are equal, round, and reactive to light  Cardiovascular:      Rate and Rhythm: Normal rate and regular rhythm  Heart sounds: Normal heart sounds     Pulmonary:      Effort: Pulmonary effort is normal       Comments: Lung sounds are clear  No wheezes, crackles or rhonchi  Abdominal:      General: There is no distension  Palpations: Abdomen is soft  Tenderness: There is no abdominal tenderness  Musculoskeletal:      Comments: No edema, cyanosis or clubbing   Skin:     General: Skin is warm and dry  Neurological:      General: No focal deficit present  Mental Status: He is alert and oriented to person, place, and time     Psychiatric:         Mood and Affect: Mood normal          Behavior: Behavior normal

## 2022-12-16 NOTE — PATIENT INSTRUCTIONS
Try taking 2 puffs of the albuterol prior to bed or just take 2 puffs when you get that episode of shortness of breath when you sleep

## 2022-12-18 NOTE — ASSESSMENT & PLAN NOTE
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

## 2022-12-18 NOTE — ASSESSMENT & PLAN NOTE
Continue Arnuity 100 mcg 1 puff daily use albuterol inhaler 2 puffs when needed  Wheezing on examination today

## 2022-12-18 NOTE — ASSESSMENT & PLAN NOTE
Moderate STEFANY  He has missed using his CPAP several nights  He is on an auto CPAP set at a pressure range of 12 to 18 6 cm of water  He uses the CPAP on average 4 5 hours of usage and resultant AHI was minimally elevated 6 8  Did not have much of the mask leak  He will continue with the AirFit F 20 foam full mask and will try to use CPAP on a regular basis  The company is [employer]  He has a [employer] Airview auto CPAP machine

## 2023-02-01 DIAGNOSIS — C91.10 CLL (CHRONIC LYMPHOCYTIC LEUKEMIA) (HCC): ICD-10-CM

## 2023-02-01 DIAGNOSIS — D80.3 IGG DEFICIENCY (HCC): Primary | ICD-10-CM

## 2023-05-05 ENCOUNTER — OFFICE VISIT (OUTPATIENT)
Dept: GASTROENTEROLOGY | Facility: AMBULARY SURGERY CENTER | Age: 83
End: 2023-05-05

## 2023-05-05 VITALS
OXYGEN SATURATION: 98 % | HEART RATE: 65 BPM | BODY MASS INDEX: 24.46 KG/M2 | SYSTOLIC BLOOD PRESSURE: 146 MMHG | DIASTOLIC BLOOD PRESSURE: 76 MMHG | HEIGHT: 68 IN | WEIGHT: 161.4 LBS

## 2023-05-05 DIAGNOSIS — Z86.010 HISTORY OF COLON POLYPS: Primary | ICD-10-CM

## 2023-05-05 PROBLEM — Z86.0100 HISTORY OF COLON POLYPS: Status: ACTIVE | Noted: 2023-05-05

## 2023-05-05 RX ORDER — ACALABRUTINIB 100 MG/1
TABLET, FILM COATED ORAL
COMMUNITY
Start: 2023-04-24

## 2023-05-05 NOTE — ASSESSMENT & PLAN NOTE
Patient has history of colon but had a colonoscopy 3 years ago and had couple hyperplastic polyps removed at that time  He was concerned about the diarrhea couple of months ago and made this appointment  He has been doing well in the past couple of months without any more episodes of diarrhea     -Discussed with patient about the screening guidelines regarding colonoscopy  He did not remember about the colonoscopy 3 years ago and thought his last colonoscopy was many years ago  He does not want to go through colonoscopy now    Advised him to call immediately if he develops any GI symptoms

## 2023-05-05 NOTE — PROGRESS NOTES
Follow-up Note -  [facility]  Carlye Boeck 1940 male         Reason: Follow-up regarding colonoscopy    HPI:  Dr Leonidas Huber came in to discuss about colonoscopy  Patient made this appointment when he was having diarrhea before  He thought his last colonoscopy was many years ago and is due for colonoscopy at this time  When I reviewed the previous records we found out that he had colonoscopy in [facility] in May 2020 which showed resolving colitis, diverticulosis, couple of hyperplastic polyps removed  Patient does not remember any of those details  He reports having diarrhea for a couple of months and recently but has been doing well in the past 2 months without any episodes of diarrhea  He reports having regular bowel movements  Good appetite, no recent weight loss  No abdominal pain, nausea or vomiting  Denies any heartburn or acid reflux  Denies any difficulty swallowing  Chaperon: Ms Av Friedman: Review of Systems   Constitutional: Negative for activity change, appetite change, chills, diaphoresis, fatigue and unexpected weight change  HENT: Negative for ear discharge, ear pain, facial swelling, hearing loss, nosebleeds, sore throat, tinnitus and voice change  Eyes: Negative for pain, discharge, redness, itching and visual disturbance  Respiratory: Negative for apnea, cough, chest tightness, shortness of breath and wheezing  Cardiovascular: Negative for chest pain and palpitations  Gastrointestinal:        As noted in HPI   Endocrine: Negative for cold intolerance, heat intolerance and polyuria  Genitourinary: Positive for frequency  Negative for difficulty urinating, flank pain and urgency  Musculoskeletal: Negative for back pain, gait problem and joint swelling  Skin: Negative for rash and wound  Neurological: Negative for dizziness, tremors, seizures, speech difficulty, light-headedness and numbness     Hematological: Negative for adenopathy  Does not bruise/bleed easily  Psychiatric/Behavioral: Negative for agitation, behavioral problems and confusion  The patient is not nervous/anxious  Past Medical History:   Diagnosis Date    Arthritis     BPH (benign prostatic hyperplasia)     Cancer (HCC)     Chronic lymphocytic leukemia (CLL), B-cell (HCC)     CPAP (continuous positive airway pressure) dependence     Depression     Hearing aid worn     bilateral    Hyperlipidemia     controlled    Hypertension     controlled    Sinusitis     Sleep apnea     CPAP    Tinnitus     Wears glasses       Past Surgical History:   Procedure Laterality Date    BACK SURGERY  09/2017    laminotomy L3,4 S1-    CARPAL TUNNEL RELEASE Bilateral     CATARACT EXTRACTION      left    CATARACT EXTRACTION W/ INTRAOCULAR LENS IMPLANT Left 12/11/2017    Procedure: EXTRACTION EXTRACAPSULAR CATARACT PHACO INTRAOCULAR LENS (IOL); Surgeon: Mala Reddy MD;  Location: [facility] MAIN OR;  Service: Ophthalmology    CERVICAL DISCECTOMY  1982    C5-6    COLONOSCOPY      HERNIA REPAIR      l inguinal    IR LOWER EXTREMITY / INTERVENTION  12/2/2019    AK EXCISION MALIGNANT LESION F/E/E/N/L 1 1-2 0 CM Right 1/11/2022    Procedure: EXCISION SKIN LESION CHEEK WITH FROZEN SECTION;  Surgeon: Fidel Banks MD;  Location: [address] [place] [address];  Service: Plastics    AK XCAPSL CTRC RMVL INSJ IO LENS PROSTH W/O ECP Right 11/14/2022    Procedure: EXTRACTION EXTRACAPSULAR CATARACT PHACO INTRAOCULAR LENS (IOL);   Surgeon: Mala Reddy MD;  Location: [facility] MAIN OR;  Service: Ophthalmology    ROTATOR CUFF REPAIR Left 2006    TONSILLECTOMY       Social History     Socioeconomic History    Marital status: /Civil Union     Spouse name: Not on file    Number of children: Not on file    Years of education: Not on file    Highest education level: Not on file   Occupational History    Not on file   Tobacco Use    Smoking status: Former     Packs/day: 1 00     Years: 20 00     Pack years: 20 00     Types: Cigarettes     Quit date: 5     Years since quittin 3    Smokeless tobacco: Never   Vaping Use    Vaping Use: Never used   Substance and Sexual Activity    Alcohol use: Yes     Alcohol/week: 7 0 standard drinks     Types: 7 Glasses of wine per week     Comment: occ    Drug use: No    Sexual activity: Yes     Partners: Female   Other Topics Concern    Not on file   Social History Narrative    Not on file     Social Determinants of Health     Financial Resource Strain: Not on file   Food Insecurity: Not on file   Transportation Needs: Not on file   Physical Activity: Not on file   Stress: Not on file   Social Connections: Not on file   Intimate Partner Violence: Not on file   Housing Stability: Not on file     Family History   Problem Relation Age of Onset    Cancer Mother         bladder    Heart disease Father     Stroke Father     Parkinsonism Brother      Rocephin [ceftriaxone]  Current Outpatient Medications   Medication Sig Dispense Refill    Acalabrutinib 100 MG CAPS Take 1 capsule by mouth 2 (two) times a day      albuterol (ProAir HFA) 90 mcg/act inhaler Inhale 2 puffs every 4 (four) hours as needed for wheezing 8 5 g 6    buPROPion (WELLBUTRIN XL) 300 mg 24 hr tablet Take 300 mg by mouth every morning      Calquence 100 MG TABS       Immune Globulin, Human, (GAMUNEX-C IJ)       Multiple Vitamins-Minerals (PRESERVISION AREDS 2 PO) Take by mouth 2 (two) times a day      rosuvastatin (CRESTOR) 20 MG tablet       tadalafil (CIALIS) 5 MG tablet daily at bedtime      amoxicillin (AMOXIL) 500 mg capsule TAKE 4 CAPSULES BY MOUTH ONE HOUR PRIOR TO DENTAL APPOINTMENT      Arnuity Ellipta 100 MCG/ACT AEPB inhaler Inhale 1 puff daily Rinse mouth after use   1 blister 6    atropine (ISOPTO ATROPINE) 1 % ophthalmic solution INSTILL 1 DROP IN THE RIGHT EYE THREE TIMES DAILY      gatifloxacin (ZYMAXID) 0 5 % Administer 1 drop to the right eye 2 (two) times "a day 3 mL 0    immune globulin, human (Bivigam) 5 GM/50ML 15mg IV every 8 weeks      ketorolac (ACULAR) 0 5 % ophthalmic solution Administer 1 drop to the right eye 4 (four) times a day 5 mL 0    lisinopril (ZESTRIL) 20 mg tablet Take 20 mg by mouth daily Taking 1 qd      metoprolol succinate (TOPROL-XL) 25 mg 24 hr tablet Take 25 mg by mouth every morning Takes 1 1/12 a m   prednisoLONE acetate (PRED FORTE) 1 % ophthalmic suspension INSTILL 1 DROP IN THE RIGHT EYE FOUR TIMES DAILY       No current facility-administered medications for this visit  Blood pressure 146/76, pulse 65, height 5' 8\" (1 727 m), weight 73 2 kg (161 lb 6 4 oz), SpO2 98 %  PHYSICAL EXAM: Physical Exam  Constitutional:       Appearance: He is well-developed  HENT:      Head: Normocephalic and atraumatic  Eyes:      General: No scleral icterus  Right eye: No discharge  Left eye: No discharge  Conjunctiva/sclera: Conjunctivae normal       Pupils: Pupils are equal, round, and reactive to light  Neck:      Thyroid: No thyromegaly  Vascular: No JVD  Trachea: No tracheal deviation  Cardiovascular:      Rate and Rhythm: Normal rate and regular rhythm  Heart sounds: Normal heart sounds  No murmur heard  No friction rub  No gallop  Pulmonary:      Effort: Pulmonary effort is normal  No respiratory distress  Breath sounds: Normal breath sounds  No wheezing or rales  Chest:      Chest wall: No tenderness  Abdominal:      General: Bowel sounds are normal  There is no distension  Palpations: Abdomen is soft  There is no mass  Tenderness: There is no abdominal tenderness  There is no guarding or rebound  Hernia: No hernia is present  Musculoskeletal:      Cervical back: Neck supple  Lymphadenopathy:      Cervical: No cervical adenopathy  Skin:     General: Skin is warm and dry  Findings: No erythema or rash     Neurological:      Mental Status: He is alert " and oriented to person, place, and time  Psychiatric:         Behavior: Behavior normal          Thought Content: Thought content normal           Lab Results   Component Value Date    WBC 8 14 09/30/2022    HGB 13 7 09/30/2022    HCT 43 3 09/30/2022    MCV 95 09/30/2022     09/30/2022     Lab Results   Component Value Date    CALCIUM 8 8 09/30/2022    K 4 4 09/30/2022    CO2 29 09/30/2022     09/30/2022    BUN 20 09/30/2022    CREATININE 1 20 09/30/2022     Lab Results   Component Value Date    ALT 32 09/30/2022    AST 21 09/30/2022    ALKPHOS 61 09/30/2022     Lab Results   Component Value Date    INR 0 98 06/21/2022    INR 0 94 01/04/2021    INR 0 95 12/02/2019    PROTIME 13 0 06/21/2022    PROTIME 10 6 01/04/2021    PROTIME 12 8 12/02/2019       No results found  ASSESSMENT & PLAN:    History of colon polyps  Patient has history of colon but had a colonoscopy 3 years ago and had couple hyperplastic polyps removed at that time  He was concerned about the diarrhea couple of months ago and made this appointment  He has been doing well in the past couple of months without any more episodes of diarrhea     -Discussed with patient about the screening guidelines regarding colonoscopy  He did not remember about the colonoscopy 3 years ago and thought his last colonoscopy was many years ago  He does not want to go through colonoscopy now    Advised him to call immediately if he develops any GI symptoms

## 2023-05-09 ENCOUNTER — TELEPHONE (OUTPATIENT)
Dept: HEMATOLOGY ONCOLOGY | Facility: CLINIC | Age: 83
End: 2023-05-09

## 2023-05-09 DIAGNOSIS — D80.3 IGG DEFICIENCY (HCC): Primary | ICD-10-CM

## 2023-05-09 DIAGNOSIS — C91.10 CLL (CHRONIC LYMPHOCYTIC LEUKEMIA) (HCC): ICD-10-CM

## 2023-05-09 NOTE — TELEPHONE ENCOUNTER
Patient Call    Who are you speaking with? patient    If it is not the patient, are they listed on an active communication consent form? N/A   What is the reason for this call? Pt called to ask if Dr Rosy Bansal wanted him to have bloodwork or make an appt with him   Does this require a call back? Yes   If a call back is required, please list best call back number [phone]   If a call back is required, advise that a message will be forwarded to their care team and someone will return their call as soon as possible  Did you relay this information to the patient?  Yes

## 2023-05-09 NOTE — TELEPHONE ENCOUNTER
Pt needs to have cbcd, cmp and IgG drawn prior to appt  Pt needs to have a fu appt scheduled in the [facility] office

## 2023-05-11 ENCOUNTER — APPOINTMENT (OUTPATIENT)
Dept: LAB | Facility: CLINIC | Age: 83
End: 2023-05-11

## 2023-05-11 ENCOUNTER — TRANSCRIBE ORDERS (OUTPATIENT)
Dept: LAB | Facility: CLINIC | Age: 83
End: 2023-05-11

## 2023-05-11 DIAGNOSIS — I10 ESSENTIAL HYPERTENSION, MALIGNANT: ICD-10-CM

## 2023-05-11 DIAGNOSIS — C91.11 CHRONIC LYMPHOID LEUKEMIA IN REMISSION (HCC): ICD-10-CM

## 2023-05-11 DIAGNOSIS — E13.69 OTHER SPECIFIED DIABETES MELLITUS WITH OTHER SPECIFIED COMPLICATION, UNSPECIFIED WHETHER LONG TERM INSULIN USE (HCC): Primary | ICD-10-CM

## 2023-05-11 DIAGNOSIS — C91.10 CLL (CHRONIC LYMPHOCYTIC LEUKEMIA) (HCC): ICD-10-CM

## 2023-05-11 DIAGNOSIS — N13.8 ENLARGED PROSTATE WITH URINARY OBSTRUCTION: ICD-10-CM

## 2023-05-11 DIAGNOSIS — D80.3 IGG DEFICIENCY (HCC): ICD-10-CM

## 2023-05-11 DIAGNOSIS — N40.1 ENLARGED PROSTATE WITH URINARY OBSTRUCTION: ICD-10-CM

## 2023-05-11 DIAGNOSIS — E13.69 OTHER SPECIFIED DIABETES MELLITUS WITH OTHER SPECIFIED COMPLICATION, UNSPECIFIED WHETHER LONG TERM INSULIN USE (HCC): ICD-10-CM

## 2023-05-11 LAB
ALBUMIN SERPL BCP-MCNC: 3.8 G/DL (ref 3.5–5)
ALP SERPL-CCNC: 57 U/L (ref 46–116)
ALT SERPL W P-5'-P-CCNC: 31 U/L (ref 12–78)
ANION GAP SERPL CALCULATED.3IONS-SCNC: 4 MMOL/L (ref 4–13)
AST SERPL W P-5'-P-CCNC: 32 U/L (ref 5–45)
BASOPHILS # BLD AUTO: 0.02 THOUSANDS/ÂΜL (ref 0–0.1)
BASOPHILS NFR BLD AUTO: 0 % (ref 0–1)
BILIRUB SERPL-MCNC: 0.87 MG/DL (ref 0.2–1)
BUN SERPL-MCNC: 16 MG/DL (ref 5–25)
CALCIUM SERPL-MCNC: 9.2 MG/DL (ref 8.3–10.1)
CHLORIDE SERPL-SCNC: 103 MMOL/L (ref 96–108)
CHOLEST SERPL-MCNC: 125 MG/DL
CO2 SERPL-SCNC: 26 MMOL/L (ref 21–32)
CREAT SERPL-MCNC: 0.97 MG/DL (ref 0.6–1.3)
EOSINOPHIL # BLD AUTO: 0.07 THOUSAND/ÂΜL (ref 0–0.61)
EOSINOPHIL NFR BLD AUTO: 1 % (ref 0–6)
ERYTHROCYTE [DISTWIDTH] IN BLOOD BY AUTOMATED COUNT: 13.2 % (ref 11.6–15.1)
EST. AVERAGE GLUCOSE BLD GHB EST-MCNC: 111 MG/DL
GFR SERPL CREATININE-BSD FRML MDRD: 73 ML/MIN/1.73SQ M
GLUCOSE P FAST SERPL-MCNC: 110 MG/DL (ref 65–99)
HBA1C MFR BLD: 5.5 %
HCT VFR BLD AUTO: 41.3 % (ref 36.5–49.3)
HDLC SERPL-MCNC: 76 MG/DL
HGB BLD-MCNC: 13.2 G/DL (ref 12–17)
IGA SERPL-MCNC: 19 MG/DL (ref 70–400)
IGG SERPL-MCNC: 558 MG/DL (ref 700–1600)
IGM SERPL-MCNC: 27 MG/DL (ref 40–230)
IMM GRANULOCYTES # BLD AUTO: 0.01 THOUSAND/UL (ref 0–0.2)
IMM GRANULOCYTES NFR BLD AUTO: 0 % (ref 0–2)
LDH SERPL-CCNC: 177 U/L (ref 81–234)
LDLC SERPL CALC-MCNC: 37 MG/DL (ref 0–100)
LYMPHOCYTES # BLD AUTO: 1.41 THOUSANDS/ÂΜL (ref 0.6–4.47)
LYMPHOCYTES NFR BLD AUTO: 23 % (ref 14–44)
MCH RBC QN AUTO: 30.5 PG (ref 26.8–34.3)
MCHC RBC AUTO-ENTMCNC: 32 G/DL (ref 31.4–37.4)
MCV RBC AUTO: 95 FL (ref 82–98)
MONOCYTES # BLD AUTO: 0.76 THOUSAND/ÂΜL (ref 0.17–1.22)
MONOCYTES NFR BLD AUTO: 12 % (ref 4–12)
NEUTROPHILS # BLD AUTO: 3.84 THOUSANDS/ÂΜL (ref 1.85–7.62)
NEUTS SEG NFR BLD AUTO: 64 % (ref 43–75)
NONHDLC SERPL-MCNC: 49 MG/DL
NRBC BLD AUTO-RTO: 0 /100 WBCS
PLATELET # BLD AUTO: 178 THOUSANDS/UL (ref 149–390)
PMV BLD AUTO: 11.6 FL (ref 8.9–12.7)
POTASSIUM SERPL-SCNC: 4.2 MMOL/L (ref 3.5–5.3)
PROT SERPL-MCNC: 6.5 G/DL (ref 6.4–8.4)
RBC # BLD AUTO: 4.33 MILLION/UL (ref 3.88–5.62)
SODIUM SERPL-SCNC: 133 MMOL/L (ref 135–147)
TRIGL SERPL-MCNC: 62 MG/DL
TSH SERPL DL<=0.05 MIU/L-ACNC: 1.03 UIU/ML (ref 0.45–4.5)
URATE SERPL-MCNC: 5.7 MG/DL (ref 3.5–8.5)
WBC # BLD AUTO: 6.11 THOUSAND/UL (ref 4.31–10.16)

## 2023-06-08 ENCOUNTER — TELEPHONE (OUTPATIENT)
Dept: PULMONOLOGY | Facility: MEDICAL CENTER | Age: 83
End: 2023-06-08

## 2023-06-08 NOTE — TELEPHONE ENCOUNTER
LM for patient to call office back to schedule 6m f/u appointment, schedule next available   Appointment reminder mailed

## 2023-06-12 ENCOUNTER — OFFICE VISIT (OUTPATIENT)
Dept: HEMATOLOGY ONCOLOGY | Facility: CLINIC | Age: 83
End: 2023-06-12
Payer: MEDICARE

## 2023-06-12 ENCOUNTER — TELEPHONE (OUTPATIENT)
Dept: GYNECOLOGIC ONCOLOGY | Facility: CLINIC | Age: 83
End: 2023-06-12

## 2023-06-12 VITALS
OXYGEN SATURATION: 95 % | TEMPERATURE: 98.1 F | WEIGHT: 160 LBS | SYSTOLIC BLOOD PRESSURE: 128 MMHG | HEART RATE: 77 BPM | DIASTOLIC BLOOD PRESSURE: 64 MMHG | HEIGHT: 68 IN | RESPIRATION RATE: 17 BRPM | BODY MASS INDEX: 24.25 KG/M2

## 2023-06-12 DIAGNOSIS — Z86.79 HISTORY OF ATRIAL FIBRILLATION: ICD-10-CM

## 2023-06-12 DIAGNOSIS — G47.33 OBSTRUCTIVE SLEEP APNEA: ICD-10-CM

## 2023-06-12 DIAGNOSIS — Z99.89 CPAP (CONTINUOUS POSITIVE AIRWAY PRESSURE) DEPENDENCE: ICD-10-CM

## 2023-06-12 DIAGNOSIS — D80.3 IGG DEFICIENCY (HCC): ICD-10-CM

## 2023-06-12 DIAGNOSIS — E78.5 HYPERLIPIDEMIA, UNSPECIFIED HYPERLIPIDEMIA TYPE: ICD-10-CM

## 2023-06-12 DIAGNOSIS — C91.10 CLL (CHRONIC LYMPHOCYTIC LEUKEMIA) (HCC): Primary | ICD-10-CM

## 2023-06-12 DIAGNOSIS — I10 PRIMARY HYPERTENSION: ICD-10-CM

## 2023-06-12 PROCEDURE — 99214 OFFICE O/P EST MOD 30 MIN: CPT | Performed by: INTERNAL MEDICINE

## 2023-06-12 NOTE — PROGRESS NOTES
"       HPI:   Patient is here with his wife  Follow-up visit for CLL and patient has been on acalabrutinib 100 mg twice a day and he has been tolerating that well without problem  He gets acalabrutinib from [facility]  He has history of atrial fibrillation but he is not symptomatic from that  He is not on blood thinner  No headaches  No bleeding  Symptoms of common cold for the last 2 days and symptoms are improving  History of low IgG and he has been getting IVIG therapy once every 2 months  When he is in [place] he gets IVIG therapy over there  He sees Dr Gabriella Valadez at [facility] once every 6 months  He has history of mild asthma and sleep apnea and he uses CPAP at night and sleeps better with that  Much improved exertional dyspnea  Has some tiredness occasionally  ROS:  06/12/23 Reviewed 13 systems: See symptoms in HPI  Presently no  other neurological, cardiac, pulmonary, GI and  symptoms other than listed in HPI  Other symptoms are in HPI  No symptoms like fever, chills,  bleeding, bone pains, skin rash, weight loss, night sweats, arthritic symptoms,   weakness, numbness, claudication and gait problem  No more frequent infections  Not unusually sensitive to heat or cold  No swelling of the ankles  No swollen glands  Patient is not anxious  Physical Exam:  Vitals:      6/12/23 5/5/23 12/16/22   Blood Pressure 128/64 146/76 136/68   Pulse 77 65 94   Respirations 17 -- 12   Temperature 98 1 °F (36 7 °C) -- 98 8 °F (37 1 °C)   Temp Source Temporal -- Temporal   SpO2 95 % 98 % 95 %   Weight - Scale 72 6 kg (160 lb) 73 2 kg (161 lb 6 4 oz) 76 2 kg (168 lb)   Height 5' 8\" (1 727 m) 5' 8\" (1 727 m) 5' 8\" (1 727 m)   Pain Score Zero Zero --     Alert and oriented and not in distress  Vitals are above  No icterus  No oral thrush  There is no palpable neck mass  Lung fields are clear to percussion and auscultation  Heart rate is regular  There is no palp abdominal mass    The liver and spleen are " not palpably enlarged  There is no ascites  Abdomen is soft and nontender  There is no edema of the ankles  There is no calf tenderness  There is no peripheral palpable lymphadenopathy  There is no   focal neurological deficit, no skin rash,  no clubbing  Patient is not anxious  Performance status 1  Historical Information   Past Medical History:   Diagnosis Date   • Arthritis    • BPH (benign prostatic hyperplasia)    • Cancer (HCC)    • Chronic lymphocytic leukemia (CLL), B-cell (HCC)    • CPAP (continuous positive airway pressure) dependence    • Depression    • Hearing aid worn     bilateral   • Hyperlipidemia     controlled   • Hypertension     controlled   • Sinusitis    • Sleep apnea     CPAP   • Tinnitus    • Wears glasses      Past Surgical History:   Procedure Laterality Date   • BACK SURGERY  09/2017    laminotomy L3,4 S1-   • CARPAL TUNNEL RELEASE Bilateral    • CATARACT EXTRACTION      left   • CATARACT EXTRACTION W/ INTRAOCULAR LENS IMPLANT Left 12/11/2017    Procedure: EXTRACTION EXTRACAPSULAR CATARACT PHACO INTRAOCULAR LENS (IOL); Surgeon: Brenna North MD;  Location: [facility] MAIN OR;  Service: Ophthalmology   • CERVICAL DISCECTOMY  1982    C5-6   • COLONOSCOPY     • HERNIA REPAIR      l inguinal   • IR LOWER EXTREMITY / INTERVENTION  12/2/2019   • AL EXCISION MALIGNANT LESION F/E/E/N/L 1 1-2 0 CM Right 1/11/2022    Procedure: EXCISION SKIN LESION CHEEK WITH FROZEN SECTION;  Surgeon: Louie Crook MD;  Location: [address] [place] [address];  Service: Plastics   • AL XCAPSL CTRC RMVL INSJ IO LENS PROSTH W/O ECP Right 11/14/2022    Procedure: EXTRACTION EXTRACAPSULAR CATARACT PHACO INTRAOCULAR LENS (IOL);   Surgeon: Brenna North MD;  Location: [facility] MAIN OR;  Service: Ophthalmology   • ROTATOR CUFF REPAIR Left 2006   • TONSILLECTOMY       Social History   Social History     Substance and Sexual Activity   Alcohol Use Yes   • Alcohol/week: 7 0 standard drinks of alcohol   • Types: 7 Glasses of wine per week    Comment: occ     Social History     Substance and Sexual Activity   Drug Use No     Social History     Tobacco Use   Smoking Status Former   • Packs/day: 1 00   • Years: 20 00   • Total pack years: 20 00   • Types: Cigarettes   • Quit date: 5   • Years since quittin 4   Smokeless Tobacco Never     Family History:   Family History   Problem Relation Age of Onset   • Cancer Mother         bladder   • Heart disease Father    • Stroke Father    • Parkinsonism Brother          Current Outpatient Medications:   •  Acalabrutinib 100 MG CAPS, Take 1 capsule by mouth 2 (two) times a day, Disp: , Rfl:   •  albuterol (ProAir HFA) 90 mcg/act inhaler, Inhale 2 puffs every 4 (four) hours as needed for wheezing, Disp: 8 5 g, Rfl: 6  •  buPROPion (WELLBUTRIN XL) 300 mg 24 hr tablet, Take 300 mg by mouth every morning, Disp: , Rfl:   •  Calquence 100 MG TABS, , Disp: , Rfl:   •  immune globulin, human (Bivigam) 5 GM/50ML, 15mg IV every 8 weeks, Disp: , Rfl:   •  Immune Globulin, Human, (GAMUNEX-C IJ), , Disp: , Rfl:   •  Multiple Vitamins-Minerals (PRESERVISION AREDS 2 PO), Take by mouth 2 (two) times a day, Disp: , Rfl:   •  rosuvastatin (CRESTOR) 20 MG tablet, , Disp: , Rfl:   •  tadalafil (CIALIS) 5 MG tablet, daily at bedtime, Disp: , Rfl:   •  amoxicillin (AMOXIL) 500 mg capsule, TAKE 4 CAPSULES BY MOUTH ONE HOUR PRIOR TO DENTAL APPOINTMENT, Disp: , Rfl:   •  Arnuity Ellipta 100 MCG/ACT AEPB inhaler, Inhale 1 puff daily Rinse mouth after use , Disp: 1 blister, Rfl: 6  •  atropine (ISOPTO ATROPINE) 1 % ophthalmic solution, INSTILL 1 DROP IN THE RIGHT EYE THREE TIMES DAILY, Disp: , Rfl:   •  gatifloxacin (ZYMAXID) 0 5 %, Administer 1 drop to the right eye 2 (two) times a day, Disp: 3 mL, Rfl: 0  •  ketorolac (ACULAR) 0 5 % ophthalmic solution, Administer 1 drop to the right eye 4 (four) times a day, Disp: 5 mL, Rfl: 0  •  lisinopril (ZESTRIL) 20 mg tablet, Take 20 mg by mouth daily Taking 1 qd, Disp: , Rfl:   • metoprolol succinate (TOPROL-XL) 25 mg 24 hr tablet, Take 25 mg by mouth every morning Takes 1 1/12 a m  , Disp: , Rfl:   •  prednisoLONE acetate (PRED FORTE) 1 % ophthalmic suspension, INSTILL 1 DROP IN THE RIGHT EYE FOUR TIMES DAILY, Disp: , Rfl:     Allergies   Allergen Reactions   • Rocephin [Ceftriaxone]      Avoids d/t previous liver toxicity           Lab Results: I have reviewed all pertinent labs  LABS:    Results for orders placed or performed in visit on 05/11/23   IgG, IgA, IgM   Result Value Ref Range    IGA 19 0 (L) 70 0 - 400 0 mg/dL     0 (L) 700 0 - 1,600 0 mg/dL    IGM 27 0 (L) 40 0 - 230 0 mg/dL   LD,Blood   Result Value Ref Range     81 - 234 U/L   Uric acid   Result Value Ref Range    Uric Acid 5 7 3 5 - 8 5 mg/dL   Hemoglobin A1C   Result Value Ref Range    Hemoglobin A1C 5 5 Normal 3 8-5 6%; PreDiabetic 5 7-6 4%; Diabetic >=6 5%; Glycemic control for adults with diabetes <7 0% %     mg/dl   Lipid panel   Result Value Ref Range    Cholesterol 125 See Comment mg/dL    Triglycerides 62 See Comment mg/dL    HDL, Direct 76 >=40 mg/dL    LDL Calculated 37 0 - 100 mg/dL    Non-HDL-Chol (CHOL-HDL) 49 mg/dl   TSH, 3rd generation   Result Value Ref Range    TSH 3RD GENERATON 1 030 0 450 - 4 500 uIU/mL         Imaging Studies:   Pathology, and Other Studies: I have personally reviewed pertinent reports     Reviewed test results and  discussed with patient and explained    Assessment and Plan:  Patient is here with his wife  Follow-up visit for CLL and patient has been on acalabrutinib 100 mg twice a day and he has been tolerating that well without problem  He gets acalabrutinib from [place]  He has history of atrial fibrillation but he is not symptomatic from that  He is not on blood thinner  No headaches  No bleeding  Symptoms of common cold for the last 2 days and symptoms are improving  History of low IgG and he has been getting IVIG therapy once every 2 months  When he is in [place] he gets IVIG therapy over there  He sees Dr Gabriella [facility]adez at [facility] once every 6 months  He has history of mild asthma and sleep apnea and he uses CPAP at night and sleeps better with that  Much improved exertional dyspnea  Has some tiredness occasionally  Physical examination and test results are as recorded and discussed in detail  No change in therapy,  Acalabrutinib and IVIG therapy      All discussed in detail  Questions answered  Discussed the importance of eating healthy foods and staying active as tolerated   Goal will be prolongation of progression-free survival and safety of treatment  Patient is capable of self-care  Discussed precautions against coronavirus  1  CLL (chronic lymphocytic leukemia) (HCC)    - CBC and differential; Standing  - Comprehensive metabolic panel; Standing  - IgG; Standing  - CBC and differential  - Comprehensive metabolic panel  - IgG    2  IgG deficiency (HCC)    - IgG; Standing  - IgG    3  Obstructive sleep apnea      4  CPAP (continuous positive airway pressure) dependence      5  Primary hypertension      6  History of atrial fibrillation      7  Hyperlipidemia, unspecified hyperlipidemia type        Please restart IVIG therapy this month  He gets it every 8 weeks  He is overdue for that  Patient has acalabrutinib  Blood work ordered for every month  Follow-up in 4 months  I used the dictation device to dictate this note and there could be mistakes in my note and patient may contact my office for that        Patient voiced understanding and agreement in the discussion  Counseling / Coordination of Care    Sarah Rivera   Provided counseling and support

## 2023-06-12 NOTE — TELEPHONE ENCOUNTER
Please restart IVIG therapy this month   He gets it every 8 weeks   He is overdue for that per Dr Dunia Matias   Prefers Josh Payne any day/time

## 2023-06-12 NOTE — TELEPHONE ENCOUNTER
Left detailed message with date & time, can view on [employer]  Gave my Teams # to call back for questions

## 2023-06-12 NOTE — PATIENT INSTRUCTIONS
Please restart IVIG therapy this month  He gets it every 8 weeks  He is overdue for that  Patient has acalabrutinib  Blood work ordered for every month  Follow-up in 4 months

## 2023-06-23 DIAGNOSIS — C91.10 CLL (CHRONIC LYMPHOCYTIC LEUKEMIA) (HCC): ICD-10-CM

## 2023-06-23 DIAGNOSIS — D80.3 IGG DEFICIENCY (HCC): Primary | ICD-10-CM

## 2023-06-23 RX ORDER — SODIUM CHLORIDE 9 MG/ML
20 INJECTION, SOLUTION INTRAVENOUS ONCE
Status: CANCELLED | OUTPATIENT
Start: 2023-06-27

## 2023-06-23 RX ORDER — DIPHENHYDRAMINE HCL 25 MG
12.5 TABLET ORAL ONCE
Status: CANCELLED | OUTPATIENT
Start: 2023-06-27

## 2023-06-23 RX ORDER — ACETAMINOPHEN 325 MG/1
650 TABLET ORAL ONCE
Status: CANCELLED | OUTPATIENT
Start: 2023-06-27

## 2023-06-27 ENCOUNTER — HOSPITAL ENCOUNTER (OUTPATIENT)
Dept: INFUSION CENTER | Facility: CLINIC | Age: 83
Discharge: HOME | End: 2023-06-27
Payer: MEDICARE

## 2023-06-27 VITALS
SYSTOLIC BLOOD PRESSURE: 127 MMHG | HEART RATE: 72 BPM | OXYGEN SATURATION: 95 % | TEMPERATURE: 97.5 F | RESPIRATION RATE: 18 BRPM | DIASTOLIC BLOOD PRESSURE: 56 MMHG | BODY MASS INDEX: 24.78 KG/M2 | WEIGHT: 163 LBS

## 2023-06-27 DIAGNOSIS — C91.10 CLL (CHRONIC LYMPHOCYTIC LEUKEMIA) (HCC): ICD-10-CM

## 2023-06-27 DIAGNOSIS — D80.3 IGG DEFICIENCY (HCC): Primary | ICD-10-CM

## 2023-06-27 RX ORDER — SODIUM CHLORIDE 9 MG/ML
20 INJECTION, SOLUTION INTRAVENOUS ONCE
Status: COMPLETED | OUTPATIENT
Start: 2023-06-27 | End: 2023-06-27

## 2023-06-27 RX ORDER — ACETAMINOPHEN 325 MG/1
650 TABLET ORAL ONCE
OUTPATIENT
Start: 2023-08-22

## 2023-06-27 RX ORDER — SODIUM CHLORIDE 9 MG/ML
20 INJECTION, SOLUTION INTRAVENOUS ONCE
OUTPATIENT
Start: 2023-08-22

## 2023-06-27 RX ORDER — DIPHENHYDRAMINE HCL 25 MG
12.5 TABLET ORAL ONCE
Status: COMPLETED | OUTPATIENT
Start: 2023-06-27 | End: 2023-06-27

## 2023-06-27 RX ORDER — ACETAMINOPHEN 325 MG/1
650 TABLET ORAL ONCE
Status: COMPLETED | OUTPATIENT
Start: 2023-06-27 | End: 2023-06-27

## 2023-06-27 RX ORDER — DIPHENHYDRAMINE HCL 25 MG
12.5 TABLET ORAL ONCE
OUTPATIENT
Start: 2023-08-22

## 2023-06-27 RX ADMIN — Medication 27.5 G: at 10:43

## 2023-06-27 RX ADMIN — DIPHENHYDRAMINE HYDROCHLORIDE 12.5 MG: 25 TABLET ORAL at 10:01

## 2023-06-27 RX ADMIN — SODIUM CHLORIDE 20 ML/HR: 0.9 INJECTION, SOLUTION INTRAVENOUS at 09:54

## 2023-06-27 RX ADMIN — HYDROCORTISONE SODIUM SUCCINATE 100 MG: 100 INJECTION, POWDER, FOR SOLUTION INTRAMUSCULAR; INTRAVENOUS at 10:02

## 2023-06-27 RX ADMIN — ACETAMINOPHEN 650 MG: 325 TABLET, FILM COATED ORAL at 10:02

## 2023-06-27 NOTE — PROGRESS NOTES
Patient tolerated IVIG without issue  PIV removed intact for DC, coban wrap in place  Patient reporting cough x3 weeks - encouraged patient to follow up with PCP as it has not improved  AVS in hand  Patient ambulatory upon DC without gait disturbance noted

## 2023-06-29 ENCOUNTER — APPOINTMENT (OUTPATIENT)
Dept: RADIOLOGY | Age: 83
End: 2023-06-29
Payer: MEDICARE

## 2023-06-29 DIAGNOSIS — R05.9 COUGH, UNSPECIFIED TYPE: ICD-10-CM

## 2023-06-29 PROCEDURE — 71046 X-RAY EXAM CHEST 2 VIEWS: CPT

## 2023-07-19 DIAGNOSIS — C91.10 CLL (CHRONIC LYMPHOCYTIC LEUKEMIA) (HCC): ICD-10-CM

## 2023-07-19 DIAGNOSIS — D80.3 IGG DEFICIENCY (HCC): Primary | ICD-10-CM

## 2023-08-09 ENCOUNTER — HOSPITAL ENCOUNTER (OUTPATIENT)
Dept: NON INVASIVE DIAGNOSTICS | Facility: CLINIC | Age: 83
Discharge: HOME | End: 2023-08-09
Payer: MEDICARE

## 2023-08-09 DIAGNOSIS — I73.9 PAD (PERIPHERAL ARTERY DISEASE) (HCC): ICD-10-CM

## 2023-08-09 PROCEDURE — 93925 LOWER EXTREMITY STUDY: CPT

## 2023-08-09 PROCEDURE — 93922 UPR/L XTREMITY ART 2 LEVELS: CPT | Performed by: SURGERY

## 2023-08-09 PROCEDURE — 93925 LOWER EXTREMITY STUDY: CPT | Performed by: SURGERY

## 2023-08-09 PROCEDURE — 93923 UPR/LXTR ART STDY 3+ LVLS: CPT

## 2023-08-22 ENCOUNTER — HOSPITAL ENCOUNTER (OUTPATIENT)
Dept: INFUSION CENTER | Facility: CLINIC | Age: 83
Discharge: HOME | End: 2023-08-22
Payer: MEDICARE

## 2023-08-22 VITALS
TEMPERATURE: 96.8 F | OXYGEN SATURATION: 96 % | HEART RATE: 63 BPM | WEIGHT: 163 LBS | DIASTOLIC BLOOD PRESSURE: 61 MMHG | RESPIRATION RATE: 18 BRPM | BODY MASS INDEX: 24.78 KG/M2 | SYSTOLIC BLOOD PRESSURE: 145 MMHG

## 2023-08-22 DIAGNOSIS — D80.3 IGG DEFICIENCY (HCC): ICD-10-CM

## 2023-08-22 DIAGNOSIS — C91.10 CLL (CHRONIC LYMPHOCYTIC LEUKEMIA) (HCC): Primary | ICD-10-CM

## 2023-08-22 LAB
ALBUMIN SERPL BCP-MCNC: 4.4 G/DL (ref 3.5–5)
ALP SERPL-CCNC: 75 U/L (ref 34–104)
ALT SERPL W P-5'-P-CCNC: 17 U/L (ref 7–52)
ANION GAP SERPL CALCULATED.3IONS-SCNC: 6 MMOL/L
AST SERPL W P-5'-P-CCNC: 17 U/L (ref 13–39)
BASOPHILS # BLD AUTO: 0.02 THOUSANDS/ÂΜL (ref 0–0.1)
BASOPHILS NFR BLD AUTO: 0 % (ref 0–1)
BILIRUB SERPL-MCNC: 0.78 MG/DL (ref 0.2–1)
BUN SERPL-MCNC: 19 MG/DL (ref 5–25)
CALCIUM SERPL-MCNC: 9.1 MG/DL (ref 8.4–10.2)
CHLORIDE SERPL-SCNC: 99 MMOL/L (ref 96–108)
CO2 SERPL-SCNC: 29 MMOL/L (ref 21–32)
CREAT SERPL-MCNC: 0.87 MG/DL (ref 0.6–1.3)
EOSINOPHIL # BLD AUTO: 0.08 THOUSAND/ÂΜL (ref 0–0.61)
EOSINOPHIL NFR BLD AUTO: 1 % (ref 0–6)
ERYTHROCYTE [DISTWIDTH] IN BLOOD BY AUTOMATED COUNT: 14.4 % (ref 11.6–15.1)
GFR SERPL CREATININE-BSD FRML MDRD: 81 ML/MIN/1.73SQ M
GLUCOSE SERPL-MCNC: 174 MG/DL (ref 65–140)
HCT VFR BLD AUTO: 42.6 % (ref 36.5–49.3)
HGB BLD-MCNC: 13.6 G/DL (ref 12–17)
IGG SERPL-MCNC: 478 MG/DL (ref 700–1600)
IMM GRANULOCYTES # BLD AUTO: 0.05 THOUSAND/UL (ref 0–0.2)
IMM GRANULOCYTES NFR BLD AUTO: 1 % (ref 0–2)
LYMPHOCYTES # BLD AUTO: 1.25 THOUSANDS/ÂΜL (ref 0.6–4.47)
LYMPHOCYTES NFR BLD AUTO: 12 % (ref 14–44)
MCH RBC QN AUTO: 29.7 PG (ref 26.8–34.3)
MCHC RBC AUTO-ENTMCNC: 31.9 G/DL (ref 31.4–37.4)
MCV RBC AUTO: 93 FL (ref 82–98)
MONOCYTES # BLD AUTO: 1.11 THOUSAND/ÂΜL (ref 0.17–1.22)
MONOCYTES NFR BLD AUTO: 10 % (ref 4–12)
NEUTROPHILS # BLD AUTO: 8.23 THOUSANDS/ÂΜL (ref 1.85–7.62)
NEUTS SEG NFR BLD AUTO: 76 % (ref 43–75)
NRBC BLD AUTO-RTO: 0 /100 WBCS
PLATELET # BLD AUTO: 178 THOUSANDS/UL (ref 149–390)
PMV BLD AUTO: 10.9 FL (ref 8.9–12.7)
POTASSIUM SERPL-SCNC: 4.5 MMOL/L (ref 3.5–5.3)
PROT SERPL-MCNC: 6.5 G/DL (ref 6.4–8.4)
RBC # BLD AUTO: 4.58 MILLION/UL (ref 3.88–5.62)
SODIUM SERPL-SCNC: 134 MMOL/L (ref 135–147)
WBC # BLD AUTO: 10.74 THOUSAND/UL (ref 4.31–10.16)

## 2023-08-22 PROCEDURE — 96366 THER/PROPH/DIAG IV INF ADDON: CPT

## 2023-08-22 PROCEDURE — 96365 THER/PROPH/DIAG IV INF INIT: CPT

## 2023-08-22 PROCEDURE — 96375 TX/PRO/DX INJ NEW DRUG ADDON: CPT

## 2023-08-22 PROCEDURE — 85025 COMPLETE CBC W/AUTO DIFF WBC: CPT

## 2023-08-22 PROCEDURE — 80053 COMPREHEN METABOLIC PANEL: CPT

## 2023-08-22 PROCEDURE — 82784 ASSAY IGA/IGD/IGG/IGM EACH: CPT

## 2023-08-22 RX ORDER — DIPHENHYDRAMINE HCL 25 MG
12.5 TABLET ORAL ONCE
OUTPATIENT
Start: 2023-10-17

## 2023-08-22 RX ORDER — ACETAMINOPHEN 325 MG/1
650 TABLET ORAL ONCE
OUTPATIENT
Start: 2023-10-17

## 2023-08-22 RX ORDER — SODIUM CHLORIDE 9 MG/ML
20 INJECTION, SOLUTION INTRAVENOUS ONCE
Status: COMPLETED | OUTPATIENT
Start: 2023-08-22 | End: 2023-08-22

## 2023-08-22 RX ORDER — ACETAMINOPHEN 325 MG/1
650 TABLET ORAL ONCE
Status: COMPLETED | OUTPATIENT
Start: 2023-08-22 | End: 2023-08-22

## 2023-08-22 RX ORDER — SODIUM CHLORIDE 9 MG/ML
20 INJECTION, SOLUTION INTRAVENOUS ONCE
OUTPATIENT
Start: 2023-10-17

## 2023-08-22 RX ORDER — DIPHENHYDRAMINE HCL 25 MG
12.5 TABLET ORAL ONCE
Status: COMPLETED | OUTPATIENT
Start: 2023-08-22 | End: 2023-08-22

## 2023-08-22 RX ADMIN — Medication 27.5 G: at 09:04

## 2023-08-22 RX ADMIN — ACETAMINOPHEN 650 MG: 325 TABLET, FILM COATED ORAL at 08:33

## 2023-08-22 RX ADMIN — SODIUM CHLORIDE 20 ML/HR: 0.9 INJECTION, SOLUTION INTRAVENOUS at 08:30

## 2023-08-22 RX ADMIN — DIPHENHYDRAMINE HYDROCHLORIDE 12.5 MG: 25 TABLET ORAL at 08:33

## 2023-08-22 RX ADMIN — HYDROCORTISONE SODIUM SUCCINATE 100 MG: 100 INJECTION, POWDER, FOR SOLUTION INTRAMUSCULAR; INTRAVENOUS at 08:33

## 2023-08-22 NOTE — PROGRESS NOTES
Patient tolerated treatment today. He is aware to make next appointment prior to leaving our office today.   He declined AVS

## 2023-09-25 ENCOUNTER — OFFICE VISIT (OUTPATIENT)
Dept: PULMONOLOGY | Facility: MEDICAL CENTER | Age: 83
End: 2023-09-25
Payer: MEDICARE

## 2023-09-25 VITALS
HEIGHT: 68 IN | HEART RATE: 56 BPM | WEIGHT: 164.8 LBS | SYSTOLIC BLOOD PRESSURE: 108 MMHG | BODY MASS INDEX: 24.98 KG/M2 | DIASTOLIC BLOOD PRESSURE: 62 MMHG | RESPIRATION RATE: 12 BRPM | TEMPERATURE: 98.4 F | OXYGEN SATURATION: 94 %

## 2023-09-25 DIAGNOSIS — D80.3 IGG DEFICIENCY (HCC): ICD-10-CM

## 2023-09-25 DIAGNOSIS — G47.33 OBSTRUCTIVE SLEEP APNEA: Chronic | ICD-10-CM

## 2023-09-25 DIAGNOSIS — J45.20 MILD INTERMITTENT ASTHMA WITHOUT COMPLICATION: Primary | ICD-10-CM

## 2023-09-25 PROCEDURE — 99214 OFFICE O/P EST MOD 30 MIN: CPT | Performed by: INTERNAL MEDICINE

## 2023-09-25 PROCEDURE — 94010 BREATHING CAPACITY TEST: CPT | Performed by: INTERNAL MEDICINE

## 2023-09-25 RX ORDER — LOSARTAN POTASSIUM 25 MG/1
25 TABLET ORAL
COMMUNITY
Start: 2023-08-22

## 2023-09-25 RX ORDER — BUPROPION HYDROCHLORIDE 150 MG/1
TABLET, EXTENDED RELEASE ORAL
COMMUNITY
Start: 2023-09-05

## 2023-09-25 RX ORDER — TAMSULOSIN HYDROCHLORIDE 0.4 MG/1
0.4 CAPSULE ORAL 2 TIMES DAILY
COMMUNITY
Start: 2023-07-20

## 2023-09-25 RX ORDER — FLUTICASONE PROPIONATE 220 UG/1
1 AEROSOL, METERED RESPIRATORY (INHALATION)
Qty: 12 G | Refills: 3 | Status: SHIPPED | OUTPATIENT
Start: 2023-09-25

## 2023-09-25 NOTE — PATIENT INSTRUCTIONS
Try Flovent 220mcg 1 puff daily with spacer. Measuse peak flow rate. Normal 400 L/min. Today, your best was 375L/min. Back number [phone]. Will check for results of your sleep study.

## 2023-09-25 NOTE — PROGRESS NOTES
Assessment/Plan        Problem List Items Addressed This Visit        Respiratory    Obstructive sleep apnea (Chronic)     Mynor had last sleep study done in August 2014 which showed overall AHI of 23 with oxygen hardik 73% and mean O2 saturation of 91%. He has had difficult tolerating CPAP and has lost some weight. He just had home sleep study done through [facility] on 9/22/23 and is awaiting results. He does have tophi with staying asleep after he falls asleep. He was referred to psychologist to does cognitive behavioral therapy but has not been seen yet. Mild intermittent asthma without complication - Primary     She does get some intermittent shortness of breath occasional cough. She uses Arnuity for 3 to 4 months. Spirometry today shows evidence of some mild airflow obstruction. In past when he used Arnuity which sometimes cause him some raspiness sore throat. I will try him on Flovent 220 micro-ohm 1 puff daily and give him a spacer with that to see if this helps his intermittent shortness of breath. I gave him a peak flow meter. Peak flow rate today was 375 L/min and predicted is 400 L/min             Relevant Medications    fluticasone (Flovent HFA) 220 mcg/act inhaler    Other Relevant Orders    POCT spirometry (Completed)       Other    IgG deficiency ([address])     He receives IV gammaglobulin every 2 months last infusion was 1 month ago. Cc:  Overall doing okay, occasional shortness of breath      HPI   Patient complains of shortness of breath when walking up 1 flight of stairs. denies shortness of breath with walking a prolonged distance, even with hills. Patient also complains of shortness of breath and dizziness when he gets out of the car/stands too quickly. Patient denies wheezing and coughing. Last used Arnuity 100mg about 3-4months ago. He has some throat irritation with the Arnuity. Home sleep test performed 9/22 with [facility].  Patient has not been using his CPAP for several months. He did lose a few pounds over the last year. .  He has insomnia primarily with difficulty staying asleep. He is  fatigued during the day but denies excessive daytime somnolence. .  Previously he did have a home diagnostic sleep study done August 25, 2014 which showed overall AHI of 23 and average O2 saturation 91% with hardik of 73%. Previously had been on auto CPAP with pressure range of 12-18 cmH2O    Patient has some palpitations at night, relieved with breathing techniques. Hx of CLL. 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 Last dose of gammagloulin was 1 month ago. He is on Calquence for CLL. In the past he had history of paroxysmal atrial fibrillation due to Imbruvica and this was discontinued and he is remained in normal sinus rhythm. Accompanied by his wife Fritz Sarah today. Past Medical History:   Diagnosis Date   • Arthritis    • BPH (benign prostatic hyperplasia)    • Cancer (HCC)    • Chronic lymphocytic leukemia (CLL), B-cell (HCC)    • CPAP (continuous positive airway pressure) dependence    • Depression    • Hearing aid worn     bilateral   • Hyperlipidemia     controlled   • Hypertension     controlled   • Sinusitis    • Sleep apnea     CPAP   • Tinnitus    • Wears glasses        Past Surgical History:   Procedure Laterality Date   • BACK SURGERY  09/2017    laminotomy L3,4 S1-   • CARPAL TUNNEL RELEASE Bilateral    • CATARACT EXTRACTION      left   • CATARACT EXTRACTION W/ INTRAOCULAR LENS IMPLANT Left 12/11/2017    Procedure: EXTRACTION EXTRACAPSULAR CATARACT PHACO INTRAOCULAR LENS (IOL);   Surgeon: Alexander Borrego MD;  Location: [facility] MAIN OR;  Service: Ophthalmology   • CERVICAL DISCECTOMY  1982    C5-6   • COLONOSCOPY     • HERNIA REPAIR      l inguinal   • IR LOWER EXTREMITY / INTERVENTION  12/2/2019   • OK EXCISION MALIGNANT LESION F/E/E/N/L 1.1-2.0 CM Right 1/11/2022    Procedure: EXCISION SKIN LESION CHEEK WITH FROZEN SECTION;  Surgeon: Kelley Landry MD;  Location: [facility];  Service: Plastics   • [place] XCAPSL CTRC RMVL INSJ IO LENS PROSTH W/O ECP Right 11/14/2022    Procedure: EXTRACTION EXTRACAPSULAR CATARACT PHACO INTRAOCULAR LENS (IOL);   Surgeon: Elida Nava MD;  Location: [facility] MAIN OR;  Service: Ophthalmology   • ROTATOR CUFF REPAIR Left 2006   • TONSILLECTOMY           Current Outpatient Medications:   •  Acalabrutinib 100 MG CAPS, Take 1 capsule by mouth 2 (two) times a day, Disp: , Rfl:   •  albuterol (ProAir HFA) 90 mcg/act inhaler, Inhale 2 puffs every 4 (four) hours as needed for wheezing, Disp: 8.5 g, Rfl: 6  •  buPROPion (WELLBUTRIN SR) 150 mg 12 hr tablet, , Disp: , Rfl:   •  Calquence 100 MG TABS, , Disp: , Rfl:   •  fluticasone (Flovent HFA) 220 mcg/act inhaler, Inhale 1 puff once daily Rinse mouth after use., Disp: 12 g, Rfl: 3  •  immune globulin, human (Bivigam) 5 GM/50ML, 15mg IV every 8 weeks, Disp: , Rfl:   •  Immune Globulin, Human, (GAMUNEX-C IJ), , Disp: , Rfl:   •  losartan (COZAAR) 25 mg tablet, Take 25 mg by mouth daily at bedtime, Disp: , Rfl:   •  Multiple Vitamins-Minerals (PRESERVISION AREDS 2 PO), Take by mouth 2 (two) times a day, Disp: , Rfl:   •  rosuvastatin (CRESTOR) 20 MG tablet, , Disp: , Rfl:   •  tadalafil (CIALIS) 5 MG tablet, daily at bedtime, Disp: , Rfl:   •  tamsulosin (FLOMAX) 0.4 mg, Take 0.4 mg by mouth 2 (two) times a day, Disp: , Rfl:   •  amoxicillin (AMOXIL) 500 mg capsule, TAKE 4 CAPSULES BY MOUTH ONE HOUR PRIOR TO DENTAL APPOINTMENT, Disp: , Rfl:   •  Arnuity Ellipta 100 MCG/ACT AEPB inhaler, Inhale 1 puff daily Rinse mouth after use., Disp: 1 blister, Rfl: 6  •  atropine (ISOPTO ATROPINE) 1 % ophthalmic solution, INSTILL 1 DROP IN THE RIGHT EYE THREE TIMES DAILY, Disp: , Rfl:   •  buPROPion (WELLBUTRIN XL) 300 mg 24 hr tablet, Take 300 mg by mouth every morning, Disp: , Rfl:   •  gatifloxacin (ZYMAXID) 0.5 %, Administer 1 drop to the right eye 2 (two) times a day, Disp: 3 mL, Rfl: 0  •  ketorolac (ACULAR) 0.5 % ophthalmic solution, Administer 1 drop to the right eye 4 (four) times a day, Disp: 5 mL, Rfl: 0  •  lisinopril (ZESTRIL) 20 mg tablet, Take 20 mg by mouth daily Taking 1 qd, Disp: , Rfl:   •  metoprolol succinate (TOPROL-XL) 25 mg 24 hr tablet, Take 25 mg by mouth every morning Takes 1 1/12 a.m. , Disp: , Rfl:   •  prednisoLONE acetate (PRED FORTE) 1 % ophthalmic suspension, INSTILL 1 DROP IN THE RIGHT EYE FOUR TIMES DAILY, Disp: , Rfl:     Allergies   Allergen Reactions   • Rocephin [Ceftriaxone]      Avoids d/t previous liver toxicity       Social History     Tobacco Use   • Smoking status: Former     Packs/day: 1.00     Years: 20.00     Total pack years: 20.00     Types: Cigarettes     Quit date:      Years since quittin.7   • Smokeless tobacco: Never   Substance Use Topics   • Alcohol use: Yes     Alcohol/week: 7.0 standard drinks of alcohol     Types: 7 Glasses of wine per week     Comment: occ         Family History   Problem Relation Age of Onset   • Cancer Mother         bladder   • Heart disease Father    • Stroke Father    • Parkinsonism Brother        Review of Systems   Constitutional: Negative for appetite change and fever. HENT: Negative for ear pain, postnasal drip, rhinorrhea, sneezing, sore throat and trouble swallowing. Eyes: Negative for redness. Respiratory: Positive for shortness of breath. Cardiovascular: Negative for chest pain. Endocrine: Negative for polydipsia and polyphagia. Musculoskeletal: Negative for myalgias. Neurological: Negative for headaches. Psychiatric/Behavioral: Negative for decreased concentration. Vitals:    23 1038   BP: 108/62   Pulse: 56   Resp: 12   Temp: 98.4 °F (36.9 °C)   SpO2: 94%     Height: 5' 8" (172.7 cm)  IBW (Ideal Body Weight): 68.4 kg  Body mass index is 25.06 kg/m².   Weight (last 2 days)     Date/Time Weight    23 1038 74.8 (164.8)              Physical Exam  Constitutional:       General: He is not in acute distress. Appearance: Normal appearance. He is well-developed. HENT:      Head: Normocephalic. Right Ear: External ear normal.      Left Ear: External ear normal.      Nose: Nose normal.      Mouth/Throat:      Pharynx: Oropharynx is clear. No oropharyngeal exudate. Eyes:      Conjunctiva/sclera: Conjunctivae normal.      Pupils: Pupils are equal, round, and reactive to light. Cardiovascular:      Rate and Rhythm: Normal rate and regular rhythm. Heart sounds: Normal heart sounds. Pulmonary:      Effort: Pulmonary effort is normal.      Comments: Lung sounds are clear. No wheezes, crackles or rhonchi  Abdominal:      General: There is no distension. Palpations: Abdomen is soft. Tenderness: There is no abdominal tenderness. Musculoskeletal:      Cervical back: Neck supple. Comments: No edema, cyanosis or clubbing   Lymphadenopathy:      Cervical: No cervical adenopathy. Skin:     General: Skin is warm and dry. Neurological:      General: No focal deficit present. Mental Status: He is alert and oriented to person, place, and time. Psychiatric:         Mood and Affect: Mood normal.         Behavior: Behavior normal.         Thought Content:  Thought content normal.       Office Spirometry Results: done today    FVC - 3.41 L     92%  FEV1 - 1.92 L    73%  FEV1/FVC% - 56%    There is mild airflow obstruction

## 2023-09-26 NOTE — ASSESSMENT & PLAN NOTE
She does get some intermittent shortness of breath occasional cough. She uses Arnuity for 3 to 4 months. Spirometry today shows evidence of some mild airflow obstruction. In past when he used Arnuity which sometimes cause him some raspiness sore throat. I will try him on Flovent 220 micro-ohm 1 puff daily and give him a spacer with that to see if this helps his intermittent shortness of breath. I gave him a peak flow meter.   Peak flow rate today was 375 L/min and predicted is 400 L/min

## 2023-09-26 NOTE — ASSESSMENT & PLAN NOTE
Hao Gomez had last sleep study done in August 2014 which showed overall AHI of 23 with oxygen hardik 73% and mean O2 saturation of 91%. He has had difficult tolerating CPAP and has lost some weight. He just had home sleep study done through [facility] on 9/22/23 and is awaiting results. He does have tophi with staying asleep after he falls asleep. He was referred to psychologist to does cognitive behavioral therapy but has not been seen yet.

## 2023-09-28 ENCOUNTER — OFFICE VISIT (OUTPATIENT)
Dept: VASCULAR SURGERY | Facility: CLINIC | Age: 83
End: 2023-09-28
Payer: MEDICARE

## 2023-09-28 ENCOUNTER — TELEPHONE (OUTPATIENT)
Dept: VASCULAR SURGERY | Facility: CLINIC | Age: 83
End: 2023-09-28

## 2023-09-28 VITALS
HEART RATE: 60 BPM | WEIGHT: 166.8 LBS | RESPIRATION RATE: 18 BRPM | DIASTOLIC BLOOD PRESSURE: 70 MMHG | HEIGHT: 68 IN | SYSTOLIC BLOOD PRESSURE: 132 MMHG | BODY MASS INDEX: 25.28 KG/M2

## 2023-09-28 DIAGNOSIS — I73.9 PAD (PERIPHERAL ARTERY DISEASE) (HCC): Primary | ICD-10-CM

## 2023-09-28 PROCEDURE — 99213 OFFICE O/P EST LOW 20 MIN: CPT | Performed by: SURGERY

## 2023-09-28 NOTE — TELEPHONE ENCOUNTER
This is a reminder; patient is due for LEXY AND OV . Please call patient and schedule the following by the dates provided. Patient's appointment(s) are due on or after 8/10/2024.     Dopplers  [] Abdominal Aorta Iliac (AOIL)  [] Carotid (CV)   [] Celiac and/or Mesenteric  [] Endovascular Aortic Repair (EVAR)   [] Hemodialysis Access (HD)   [x] Lower Limb Arterial (LEXY)  DUE AFTER 8/10/2024  [] Lower Limb Venous (LEV)  [] Lower Limb Venous Duplex with Reflux (LEVDR)  [] Renal Artery  [] Upper Limb Arterial (UEA)    [] Upper Limb Venous (UEV)              [] BRUNO and Waveform analysis     Advanced Imaging   [] CTA head/neck    [] CTA abdomen    [] CTA abdomen & pelvis    [] CT abdomen with/ without contrast  [] CT abdomen with contrast  [] CT abdomen without contrast    [] CT abdomen & pelvis with/ without contrast  [] CT abdomen & pelvis with contrast  [] CT abdomen & pelvis without contrast    Office Visit   [] New patient, patient last seen over 3 years ago  [] Risk factor modification (RFM)   [x] Follow up  [facility] AUG 2024  [] Lost to follow up ([facility])

## 2023-09-28 NOTE — PATIENT INSTRUCTIONS
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 He is doing well with continued wide patency of his right SFA and popliteal artery intervention and is having no claudication symptoms since the procedure in 2019. No history of ischemic rest pain or tissue loss. He does complain of some mild dizziness and shortness of breath while standing. He has normal blood pressure and heart rate at this time on evaluation in the office.     Plan: Follow-up lower extremity arterial study in 1 year

## 2023-09-28 NOTE — PROGRESS NOTES
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 He is doing well with continued wide patency of his right SFA and popliteal artery intervention and is having no claudication symptoms since the procedure in 2019. No history of ischemic rest pain or tissue loss. He does complain of some mild dizziness and shortness of breath while standing. He has normal blood pressure and heart rate at this time on evaluation in the office. Plan: Follow-up lower extremity arterial study in 1 year     Diagnoses and all orders for this visit:    PAD (peripheral artery disease) ([facility])  -     VAS lower limb arterial duplex, complete bilateral; Future        Subjective:      Patient ID: Rosio Chirinos is a 82 y.o. male. Patient had a LEXY on 8/9/23. Pt denies claudication, rest pain, or non-healing wounds. HPI    The following portions of the patient's history were reviewed and updated as appropriate: allergies, current medications, past family history, past medical history, past social history, past surgical history and problem list.    Review of Systems   Constitutional: Negative. HENT: Positive for hearing loss. Eyes: Negative. Respiratory: Positive for shortness of breath (upon standing and with activity). Cardiovascular: Negative. Gastrointestinal: Negative. Endocrine: Negative. Genitourinary: Negative. Musculoskeletal: Positive for arthralgias (hands). Skin: Negative. Allergic/Immunologic: Negative. Neurological: Positive for dizziness (upon standing). Hematological: Negative. Psychiatric/Behavioral: Negative. Objective: There were no vitals taken for this visit. Physical Exam      Oriented x3 no evidence of clinical depression.     Eyes:  Sclera non-icteric    Skin: normal without evidence of inflammation    Neck is supple carotid pulses equal bilaterally no bruits heard    Chest lungs clear, heart regular rhythm. Abdomen soft nontender no evidence of pulsatile masses. Pulses are palpable bilateral Femoral  Popliteal, DP and PT. Neurological exam intact cranial nerves 2-12 grossly intact no gross motor sensory deficits detected. Imaging viewed and reviewed with Patient      I have reviewed and made appropriate changes to the review of systems input by the medical assistant. Vitals:    09/28/23 1005   BP: 132/70   BP Location: Right arm   Patient Position: Sitting   Pulse: 60   Resp: 18   Weight: 75.7 kg (166 lb 12.8 oz)   Height: 5' 8" (1.727 m)       Patient Active Problem List   Diagnosis   • Mild intermittent asthma without complication   • Cough   • Obstructive sleep apnea   • CLL (chronic lymphocytic leukemia) (HCC)   • Hyperlipidemia   • PAD (peripheral artery disease) (HCC)   • Hypertension   • IgG deficiency (HCC)   • History of atrial fibrillation   • Hematemesis   • CPAP (continuous positive airway pressure) dependence   • Other gastritis with bleeding   • Gastric polyp   • History of colon polyps       Past Surgical History:   Procedure Laterality Date   • BACK SURGERY  09/2017    laminotomy L3,4 S1-   • CARPAL TUNNEL RELEASE Bilateral    • CATARACT EXTRACTION      left   • CATARACT EXTRACTION W/ INTRAOCULAR LENS IMPLANT Left 12/11/2017    Procedure: EXTRACTION EXTRACAPSULAR CATARACT PHACO INTRAOCULAR LENS (IOL);   Surgeon: Sharyle Bowen, MD;  Location: [facility];  Service: Ophthalmology   • CERVICAL DISCECTOMY  1982    C5-6   • COLONOSCOPY     • HERNIA REPAIR      l inguinal   • IR LOWER EXTREMITY / INTERVENTION  12/2/2019   • CT EXCISION MALIGNANT LESION F/E/E/N/L 1.1-2.0 CM Right 1/11/2022    Procedure: EXCISION SKIN LESION CHEEK WITH FROZEN SECTION;  Surgeon: Dana Andersen MD;  Location: [facility];  Service: Plastics   • CT XCAPSL CTRC RMVL INSJ IO LENS PROSTH W/O ECP Right 2022    Procedure: EXTRACTION EXTRACAPSULAR CATARACT PHACO INTRAOCULAR LENS (IOL); Surgeon: Jenifer Titus MD;  Location: [facility] MAIN OR;  Service: Ophthalmology   • ROTATOR CUFF REPAIR Left    • TONSILLECTOMY         Family History   Problem Relation Age of Onset   • Cancer Mother         bladder   • Heart disease Father    • Stroke Father    • Parkinsonism Brother        Social History     Socioeconomic History   • Marital status: /Civil Union     Spouse name: Not on file   • Number of children: Not on file   • Years of education: Not on file   • Highest education level: Not on file   Occupational History   • Not on file   Tobacco Use   • Smoking status: Former     Packs/day: 1.00     Years: 20.00     Total pack years: 20.00     Types: Cigarettes     Quit date: 5     Years since quittin.7   • Smokeless tobacco: Never   Vaping Use   • Vaping Use: Never used   Substance and Sexual Activity   • Alcohol use:  Yes     Alcohol/week: 7.0 standard drinks of alcohol     Types: 7 Glasses of wine per week     Comment: occ   • Drug use: No   • Sexual activity: Yes     Partners: Female   Other Topics Concern   • Not on file   Social History Narrative   • Not on file     Social Determinants of Health     Financial Resource Strain: Not on file   Food Insecurity: Not on file   Transportation Needs: Not on file   Physical Activity: Not on file   Stress: Not on file   Social Connections: Not on file   Intimate Partner Violence: Not on file   Housing Stability: Not on file       Allergies   Allergen Reactions   • Rocephin [Ceftriaxone]      Avoids d/t previous liver toxicity         Current Outpatient Medications:   •  Acalabrutinib 100 MG CAPS, Take 1 capsule by mouth 2 (two) times a day, Disp: , Rfl:   •  albuterol (ProAir HFA) 90 mcg/act inhaler, Inhale 2 puffs every 4 (four) hours as needed for wheezing, Disp: 8.5 g, Rfl: 6  •  buPROPion (WELLBUTRIN SR) 150 mg 12 hr tablet, , Disp: , Rfl:   • fluticasone (Flovent HFA) 220 mcg/act inhaler, Inhale 1 puff once daily Rinse mouth after use., Disp: 12 g, Rfl: 3  •  immune globulin, human (Bivigam) 5 GM/50ML, 15mg IV every 8 weeks, Disp: , Rfl:   •  Immune Globulin, Human, (GAMUNEX-C IJ), , Disp: , Rfl:   •  losartan (COZAAR) 25 mg tablet, Take 25 mg by mouth daily at bedtime, Disp: , Rfl:   •  Multiple Vitamins-Minerals (PRESERVISION AREDS 2 PO), Take by mouth 2 (two) times a day, Disp: , Rfl:   •  rosuvastatin (CRESTOR) 20 MG tablet, , Disp: , Rfl:   •  tadalafil (CIALIS) 5 MG tablet, daily at bedtime, Disp: , Rfl:   •  tamsulosin (FLOMAX) 0.4 mg, Take 0.4 mg by mouth 2 (two) times a day, Disp: , Rfl:   •  amoxicillin (AMOXIL) 500 mg capsule, TAKE 4 CAPSULES BY MOUTH ONE HOUR PRIOR TO DENTAL APPOINTMENT, Disp: , Rfl:   •  Arnuity Ellipta 100 MCG/ACT AEPB inhaler, Inhale 1 puff daily Rinse mouth after use., Disp: 1 blister, Rfl: 6  •  atropine (ISOPTO ATROPINE) 1 % ophthalmic solution, INSTILL 1 DROP IN THE RIGHT EYE THREE TIMES DAILY, Disp: , Rfl:   •  buPROPion (WELLBUTRIN XL) 300 mg 24 hr tablet, Take 300 mg by mouth every morning, Disp: , Rfl:   •  Calquence 100 MG TABS, , Disp: , Rfl:   •  gatifloxacin (ZYMAXID) 0.5 %, Administer 1 drop to the right eye 2 (two) times a day, Disp: 3 mL, Rfl: 0  •  ketorolac (ACULAR) 0.5 % ophthalmic solution, Administer 1 drop to the right eye 4 (four) times a day, Disp: 5 mL, Rfl: 0  •  lisinopril (ZESTRIL) 20 mg tablet, Take 20 mg by mouth daily Taking 1 qd, Disp: , Rfl:   •  metoprolol succinate (TOPROL-XL) 25 mg 24 hr tablet, Take 25 mg by mouth every morning Takes 1 1/12 a.m. , Disp: , Rfl:   •  prednisoLONE acetate (PRED FORTE) 1 % ophthalmic suspension, INSTILL 1 DROP IN THE RIGHT EYE FOUR TIMES DAILY, Disp: , Rfl:

## 2023-09-28 NOTE — ASSESSMENT & PLAN NOTE
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 He is doing well with continued wide patency of his right SFA and popliteal artery intervention and is having no claudication symptoms since the procedure in 2019. No history of ischemic rest pain or tissue loss. He does complain of some mild dizziness and shortness of breath while standing. He has normal blood pressure and heart rate at this time on evaluation in the office.       Plan: Follow-up lower extremity arterial study in 1 year

## 2023-10-09 ENCOUNTER — TELEPHONE (OUTPATIENT)
Dept: HEMATOLOGY ONCOLOGY | Facility: CLINIC | Age: 83
End: 2023-10-09

## 2023-10-09 NOTE — TELEPHONE ENCOUNTER
Attempted to contact patient regarding having labs drawn prior to appointment, patient is currently on a cruise, attempted to reschedule appointment for patient, the connection was bad, I was unable to hear the patient, sent patient a my chart message.

## 2023-10-09 NOTE — TELEPHONE ENCOUNTER
Patient Call    Who are you speaking with? Patient    If it is not the patient, are they listed on an active communication consent form? N/A   What is the reason for this call? The patient called to make sure if he completes his labs on 10/12, he can still keep his appointment on 10/13. I spoke with [place] and she stated that the patient may keep his appointment on 10/13 as long as he completes his lab work on 10/12. Does this require a call back? N/A   If a call back is required, please list best call back number N/A    If a call back is required, advise that a message will be forwarded to their care team and someone will return their call as soon as possible. Did you relay this information to the patient?  N/A

## 2023-10-12 ENCOUNTER — APPOINTMENT (OUTPATIENT)
Dept: LAB | Facility: CLINIC | Age: 83
End: 2023-10-12
Payer: MEDICARE

## 2023-10-12 LAB
ALBUMIN SERPL BCP-MCNC: 4.3 G/DL (ref 3.5–5)
ALP SERPL-CCNC: 60 U/L (ref 34–104)
ALT SERPL W P-5'-P-CCNC: 35 U/L (ref 7–52)
ANION GAP SERPL CALCULATED.3IONS-SCNC: 8 MMOL/L
AST SERPL W P-5'-P-CCNC: 30 U/L (ref 13–39)
BASOPHILS # BLD AUTO: 0.02 THOUSANDS/ÂΜL (ref 0–0.1)
BASOPHILS NFR BLD AUTO: 0 % (ref 0–1)
BILIRUB SERPL-MCNC: 0.57 MG/DL (ref 0.2–1)
BUN SERPL-MCNC: 14 MG/DL (ref 5–25)
CALCIUM SERPL-MCNC: 9.1 MG/DL (ref 8.4–10.2)
CHLORIDE SERPL-SCNC: 98 MMOL/L (ref 96–108)
CO2 SERPL-SCNC: 30 MMOL/L (ref 21–32)
CREAT SERPL-MCNC: 1.08 MG/DL (ref 0.6–1.3)
EOSINOPHIL # BLD AUTO: 0.12 THOUSAND/ÂΜL (ref 0–0.61)
EOSINOPHIL NFR BLD AUTO: 2 % (ref 0–6)
ERYTHROCYTE [DISTWIDTH] IN BLOOD BY AUTOMATED COUNT: 14.4 % (ref 11.6–15.1)
GFR SERPL CREATININE-BSD FRML MDRD: 64 ML/MIN/1.73SQ M
GLUCOSE P FAST SERPL-MCNC: 129 MG/DL (ref 65–99)
HCT VFR BLD AUTO: 42.8 % (ref 36.5–49.3)
HGB BLD-MCNC: 13.7 G/DL (ref 12–17)
IGG SERPL-MCNC: 470 MG/DL (ref 635–1741)
IMM GRANULOCYTES # BLD AUTO: 0.02 THOUSAND/UL (ref 0–0.2)
IMM GRANULOCYTES NFR BLD AUTO: 0 % (ref 0–2)
LYMPHOCYTES # BLD AUTO: 1.57 THOUSANDS/ÂΜL (ref 0.6–4.47)
LYMPHOCYTES NFR BLD AUTO: 22 % (ref 14–44)
MCH RBC QN AUTO: 29.8 PG (ref 26.8–34.3)
MCHC RBC AUTO-ENTMCNC: 32 G/DL (ref 31.4–37.4)
MCV RBC AUTO: 93 FL (ref 82–98)
MONOCYTES # BLD AUTO: 1.11 THOUSAND/ÂΜL (ref 0.17–1.22)
MONOCYTES NFR BLD AUTO: 16 % (ref 4–12)
NEUTROPHILS # BLD AUTO: 4.27 THOUSANDS/ÂΜL (ref 1.85–7.62)
NEUTS SEG NFR BLD AUTO: 60 % (ref 43–75)
NRBC BLD AUTO-RTO: 0 /100 WBCS
PLATELET # BLD AUTO: 165 THOUSANDS/UL (ref 149–390)
PMV BLD AUTO: 12 FL (ref 8.9–12.7)
POTASSIUM SERPL-SCNC: 4.4 MMOL/L (ref 3.5–5.3)
PROT SERPL-MCNC: 6.4 G/DL (ref 6.4–8.4)
RBC # BLD AUTO: 4.6 MILLION/UL (ref 3.88–5.62)
SODIUM SERPL-SCNC: 136 MMOL/L (ref 135–147)
WBC # BLD AUTO: 7.11 THOUSAND/UL (ref 4.31–10.16)

## 2023-10-13 ENCOUNTER — TELEPHONE (OUTPATIENT)
Dept: HEMATOLOGY ONCOLOGY | Facility: CLINIC | Age: 83
End: 2023-10-13

## 2023-10-13 ENCOUNTER — OFFICE VISIT (OUTPATIENT)
Dept: HEMATOLOGY ONCOLOGY | Facility: CLINIC | Age: 83
End: 2023-10-13
Payer: MEDICARE

## 2023-10-13 VITALS
SYSTOLIC BLOOD PRESSURE: 136 MMHG | TEMPERATURE: 97.8 F | BODY MASS INDEX: 25.01 KG/M2 | WEIGHT: 165 LBS | HEIGHT: 68 IN | OXYGEN SATURATION: 98 % | DIASTOLIC BLOOD PRESSURE: 68 MMHG | RESPIRATION RATE: 17 BRPM | HEART RATE: 81 BPM

## 2023-10-13 DIAGNOSIS — C91.10 CLL (CHRONIC LYMPHOCYTIC LEUKEMIA) (HCC): Primary | ICD-10-CM

## 2023-10-13 DIAGNOSIS — G47.33 OBSTRUCTIVE SLEEP APNEA: ICD-10-CM

## 2023-10-13 DIAGNOSIS — E78.5 HYPERLIPIDEMIA, UNSPECIFIED HYPERLIPIDEMIA TYPE: ICD-10-CM

## 2023-10-13 DIAGNOSIS — D80.3 IGG DEFICIENCY (HCC): ICD-10-CM

## 2023-10-13 DIAGNOSIS — I10 PRIMARY HYPERTENSION: ICD-10-CM

## 2023-10-13 DIAGNOSIS — Z99.89 CPAP (CONTINUOUS POSITIVE AIRWAY PRESSURE) DEPENDENCE: ICD-10-CM

## 2023-10-13 DIAGNOSIS — Z86.79 HISTORY OF ATRIAL FIBRILLATION: ICD-10-CM

## 2023-10-13 PROCEDURE — 99214 OFFICE O/P EST MOD 30 MIN: CPT | Performed by: INTERNAL MEDICINE

## 2023-10-13 NOTE — PROGRESS NOTES
HPI:  Patient is here with his wife. Patient has CLL and for that he has been on acalabrutinib 100 mg twice a day and he has been tolerating that well without problem. He gets acalabrutinib from [place]. He sees Dr. Kyung Alex at [facility]. He has history of atrial fibrillation but he is not symptomatic from that. He is not on blood thinner. No headaches. No bleeding. History of low IgG and he has been getting IVIG therapy once every 2 months. When he is in [place] he gets IVIG therapy over there. He has history of mild asthma and sleep apnea and he uses CPAP at night and sleeps better with that. Much improved exertional dyspnea. Has some tiredness . ROS:  10/13/23 Reviewed 13 systems: See symptoms in HPI  Presently no  other neurological, cardiac, pulmonary, GI and  symptoms other than listed in HPI. Other symptoms are in HPI. No  fever, chills,  bleeding, bone pains, skin rash, weight loss, night sweats, arthritic symptoms,   weakness, numbness, claudication and gait problem. No more frequent infections. Not unusually sensitive to heat or cold. No swelling of the ankles. No swollen glands. Patient is not anxious. Physical Exam:     10/13/23 9/28/23 9/25/23   Blood Pressure 136/68 132/70 108/62   Pulse 81 60 56   Respirations 17 18 12   Temperature 97.8 °F (36.6 °C) -- 98.4 °F (36.9 °C)   Temp Source Temporal -- Temporal   SpO2 98 % -- 94 %   Weight - Scale 74.8 kg (165 lb) 75.7 kg (166 lb 12.8 oz) 74.8 kg (164 lb 12.8 oz)   Height 5' 8" (1.727 m) 5' 8" (1.727 m) 5' 8" (1.727 m)   Pain Score Zero -- --         Patient is alert and oriented. Patient is not in distress. Vital signs are above. There is no icterus. There is no oral thrush. No palpable neck mass. Clear lung fields. Regular heart rate. Abdomen is soft and nontender. There is no palpable abdominal mass. There is no ascites. No edema of the ankles. There is no calf tenderness.   No peripheral palpable lymphadenopathy. There is no focal neurological deficit, no skin rash,  no clubbing. Patient is not anxious. Performance status 1. Historical Information   Past Medical History:   Diagnosis Date    Arthritis     BPH (benign prostatic hyperplasia)     Cancer (HCC)     Chronic lymphocytic leukemia (CLL), B-cell (HCC)     CPAP (continuous positive airway pressure) dependence     Depression     Hearing aid worn     bilateral    Hyperlipidemia     controlled    Hypertension     controlled    Sinusitis     Sleep apnea     CPAP    Tinnitus     Wears glasses      Past Surgical History:   Procedure Laterality Date    BACK SURGERY  09/2017    laminotomy L3,4 S1-    CARPAL TUNNEL RELEASE Bilateral     CATARACT EXTRACTION      left    CATARACT EXTRACTION W/ INTRAOCULAR LENS IMPLANT Left 12/11/2017    Procedure: EXTRACTION EXTRACAPSULAR CATARACT PHACO INTRAOCULAR LENS (IOL); Surgeon: Allyssa An MD;  Location: [facility] MAIN OR;  Service: Ophthalmology    CERVICAL DISCECTOMY  1982    C5-6    COLONOSCOPY      HERNIA REPAIR      l inguinal    IR LOWER EXTREMITY / INTERVENTION  12/2/2019    [place] EXCISION MALIGNANT LESION F/E/E/N/L 1.1-2.0 CM Right 1/11/2022    Procedure: EXCISION SKIN LESION CHEEK WITH FROZEN SECTION;  Surgeon: Fransisco Rodriguez MD;  Location: [facility];  Service: Plastics    [place] XCAPSL CTRC RMVL INSJ IO LENS PROSTH W/O ECP Right 11/14/2022    Procedure: EXTRACTION EXTRACAPSULAR CATARACT PHACO INTRAOCULAR LENS (IOL);   Surgeon: Allyssa An MD;  Location: [facility] OR;  Service: Ophthalmology    ROTATOR CUFF REPAIR Left 2006    TONSILLECTOMY       Social History   Social History     Substance and Sexual Activity   Alcohol Use Yes    Alcohol/week: 7.0 standard drinks of alcohol    Types: 7 Glasses of wine per week    Comment: occ     Social History     Substance and Sexual Activity   Drug Use No     Social History     Tobacco Use   Smoking Status Former    Packs/day: 1.00    Years: 20.00    Total pack years: 20. 00    Types: Cigarettes    Quit date:     Years since quittin.8   Smokeless Tobacco Never     Family History:   Family History   Problem Relation Age of Onset    Cancer Mother         bladder    Heart disease Father     Stroke Father     Parkinsonism Brother          Current Outpatient Medications:     albuterol (ProAir HFA) 90 mcg/act inhaler, Inhale 2 puffs every 4 (four) hours as needed for wheezing, Disp: 8.5 g, Rfl: 6    buPROPion (WELLBUTRIN SR) 150 mg 12 hr tablet, , Disp: , Rfl:     Calquence 100 MG TABS, , Disp: , Rfl:     fluticasone (Flovent HFA) 220 mcg/act inhaler, Inhale 1 puff once daily Rinse mouth after use., Disp: 12 g, Rfl: 3    immune globulin, human (Bivigam) 5 GM/50ML, 15mg IV every 8 weeks, Disp: , Rfl:     Immune Globulin, Human, (GAMUNEX-C IJ), , Disp: , Rfl:     losartan (COZAAR) 25 mg tablet, Take 25 mg by mouth daily at bedtime, Disp: , Rfl:     Multiple Vitamins-Minerals (PRESERVISION AREDS 2 PO), Take by mouth 2 (two) times a day, Disp: , Rfl:     rosuvastatin (CRESTOR) 20 MG tablet, , Disp: , Rfl:     tadalafil (CIALIS) 5 MG tablet, daily at bedtime, Disp: , Rfl:     tamsulosin (FLOMAX) 0.4 mg, Take 0.4 mg by mouth 2 (two) times a day, Disp: , Rfl:     Acalabrutinib 100 MG CAPS, Take 1 capsule by mouth 2 (two) times a day, Disp: , Rfl:     amoxicillin (AMOXIL) 500 mg capsule, TAKE 4 CAPSULES BY MOUTH ONE HOUR PRIOR TO DENTAL APPOINTMENT, Disp: , Rfl:     Arnuity Ellipta 100 MCG/ACT AEPB inhaler, Inhale 1 puff daily Rinse mouth after use., Disp: 1 blister, Rfl: 6    atropine (ISOPTO ATROPINE) 1 % ophthalmic solution, INSTILL 1 DROP IN THE RIGHT EYE THREE TIMES DAILY, Disp: , Rfl:     buPROPion (WELLBUTRIN XL) 300 mg 24 hr tablet, Take 300 mg by mouth every morning, Disp: , Rfl:     gatifloxacin (ZYMAXID) 0.5 %, Administer 1 drop to the right eye 2 (two) times a day, Disp: 3 mL, Rfl: 0    ketorolac (ACULAR) 0.5 % ophthalmic solution, Administer 1 drop to the right eye 4 (four) times a day, Disp: 5 mL, Rfl: 0    lisinopril (ZESTRIL) 20 mg tablet, Take 20 mg by mouth daily Taking 1 qd, Disp: , Rfl:     metoprolol succinate (TOPROL-XL) 25 mg 24 hr tablet, Take 25 mg by mouth every morning Takes 1 1/12 a.m. , Disp: , Rfl:     prednisoLONE acetate (PRED FORTE) 1 % ophthalmic suspension, INSTILL 1 DROP IN THE RIGHT EYE FOUR TIMES DAILY, Disp: , Rfl:     Allergies   Allergen Reactions    Rocephin [Ceftriaxone]      Avoids d/t previous liver toxicity           Lab Results: I have reviewed all pertinent labs.   LABS:      Results for orders placed or performed during the hospital encounter of 08/22/23   CBC and differential   Result Value Ref Range    WBC 10.74 (H) 4.31 - 10.16 Thousand/uL    RBC 4.58 3.88 - 5.62 Million/uL    Hemoglobin 13.6 12.0 - 17.0 g/dL    Hematocrit 42.6 36.5 - 49.3 %    MCV 93 82 - 98 fL    MCH 29.7 26.8 - 34.3 pg    MCHC 31.9 31.4 - 37.4 g/dL    RDW 14.4 11.6 - 15.1 %    MPV 10.9 8.9 - 12.7 fL    Platelets 613 770 - 755 Thousands/uL    nRBC 0 /100 WBCs    Neutrophils Relative 76 (H) 43 - 75 %    Immat GRANS % 1 0 - 2 %    Lymphocytes Relative 12 (L) 14 - 44 %    Monocytes Relative 10 4 - 12 %    Eosinophils Relative 1 0 - 6 %    Basophils Relative 0 0 - 1 %    Neutrophils Absolute 8.23 (H) 1.85 - 7.62 Thousands/µL    Immature Grans Absolute 0.05 0.00 - 0.20 Thousand/uL    Lymphocytes Absolute 1.25 0.60 - 4.47 Thousands/µL    Monocytes Absolute 1.11 0.17 - 1.22 Thousand/µL    Eosinophils Absolute 0.08 0.00 - 0.61 Thousand/µL    Basophils Absolute 0.02 0.00 - 0.10 Thousands/µL   Comprehensive metabolic panel   Result Value Ref Range    Sodium 134 (L) 135 - 147 mmol/L    Potassium 4.5 3.5 - 5.3 mmol/L    Chloride 99 96 - 108 mmol/L    CO2 29 21 - 32 mmol/L    ANION GAP 6 mmol/L    BUN 19 5 - 25 mg/dL    Creatinine 0.87 0.60 - 1.30 mg/dL    Glucose 174 (H) 65 - 140 mg/dL    Calcium 9.1 8.4 - 10.2 mg/dL    AST 17 13 - 39 U/L    ALT 17 7 - 52 U/L    Alkaline Phosphatase 75 34 - 104 U/L    Total Protein 6.5 6.4 - 8.4 g/dL    Albumin 4.4 3.5 - 5.0 g/dL    Total Bilirubin 0.78 0.20 - 1.00 mg/dL    eGFR 81 ml/min/1.73sq m   IgG   Result Value Ref Range    .0 (L) 700.0 - 1,600.0 mg/dL         Imaging Studies:   Pathology, and Other Studies: I have personally reviewed pertinent reports. .  Reviewed test results and  discussed with patient and explained    Assessment and Plan:  Patient is here with his wife. Patient has CLL and for that he has been on acalabrutinib 100 mg twice a day and he has been tolerating that well without problem. He gets acalabrutinib from [place]. He sees Dr. Josefa Fontaine at [facility]. He has history of atrial fibrillation but he is not symptomatic from that. He is not on blood thinner. No headaches. No bleeding. History of low IgG and he has been getting IVIG therapy once every 2 months. When he is in [place] he gets IVIG therapy over there. He has history of mild asthma and sleep apnea and he uses CPAP at night and sleeps better with that. Much improved exertional dyspnea. Has some tiredness . Physical examination and test results are as recorded and discussed in detail. No change in therapy,  Acalabrutinib and IVIG therapy. .   All discussed in detail. Questions answered. Discussed the importance of eating healthy foods and staying active as tolerated . Goal is prolongation of progression-free survival and safety of treatment. Patient is capable of self-care. Discussed precautions against coronavirus and other infections. 1. CLL (chronic lymphocytic leukemia) (HCC)    - CBC and differential; Standing  - Comprehensive metabolic panel; Standing  - IgG; Standing  - CBC and differential  - Comprehensive metabolic panel  - IgG    2. IgG deficiency (HCC)    - IgG; Standing  - IgG    3. Obstructive sleep apnea      4. CPAP (continuous positive airway pressure) dependence      5. Primary hypertension      6.  History of atrial fibrillation      7. Hyperlipidemia, unspecified hyperlipidemia type      Has standing orders for blood work and IVIG. Follow up in 6 months      I used the dictation device to dictate this note and there could be mistakes in my note and patient may contact my office for that        Patient voiced understanding and agreement in the discussion. Counseling / Coordination of Care  . Kulwant Fletcher   Provided counseling and support

## 2023-10-13 NOTE — TELEPHONE ENCOUNTER
Left another detailed message with date & time. Patient has logged into [employer] since my last message. My Teams # was provided for any questions.

## 2023-10-13 NOTE — TELEPHONE ENCOUNTER
Called house # & patient's wife Felipa's phone, left detailed message with date & time. Can view on [employer]. Gave my Teams # to call back to confirm.

## 2023-10-20 ENCOUNTER — TRANSCRIBE ORDERS (OUTPATIENT)
Dept: ADMINISTRATIVE | Facility: HOSPITAL | Age: 83
End: 2023-10-20

## 2023-10-20 ENCOUNTER — HOSPITAL ENCOUNTER (INPATIENT)
Facility: HOSPITAL | Age: 83
LOS: 2 days | Discharge: HOME | DRG: 194 | End: 2023-10-22
Attending: EMERGENCY MEDICINE | Admitting: INTERNAL MEDICINE
Payer: MEDICARE

## 2023-10-20 ENCOUNTER — HOSPITAL ENCOUNTER (OUTPATIENT)
Dept: CT IMAGING | Facility: HOSPITAL | Age: 83
End: 2023-10-20
Attending: INTERNAL MEDICINE
Payer: MEDICARE

## 2023-10-20 DIAGNOSIS — N40.1 BENIGN PROSTATIC HYPERPLASIA WITH LOWER URINARY TRACT SYMPTOMS, SYMPTOM DETAILS UNSPECIFIED: ICD-10-CM

## 2023-10-20 DIAGNOSIS — I10 ESSENTIAL HYPERTENSION, MALIGNANT: ICD-10-CM

## 2023-10-20 DIAGNOSIS — C91.10 CLL (CHRONIC LYMPHOCYTIC LEUKEMIA) (HCC): ICD-10-CM

## 2023-10-20 DIAGNOSIS — R06.00 DYSPNEA: Primary | ICD-10-CM

## 2023-10-20 DIAGNOSIS — J45.40 MODERATE PERSISTENT ASTHMA WITHOUT COMPLICATION: ICD-10-CM

## 2023-10-20 DIAGNOSIS — D80.3 IGG DEFICIENCY (HCC): ICD-10-CM

## 2023-10-20 DIAGNOSIS — J18.9 PNEUMONIA, UNSPECIFIED ORGANISM: Primary | ICD-10-CM

## 2023-10-20 DIAGNOSIS — D80.3 SELECTIVE DEFICIENCY OF IGG (HCC): ICD-10-CM

## 2023-10-20 DIAGNOSIS — J18.9 PNEUMONIA, UNSPECIFIED ORGANISM: ICD-10-CM

## 2023-10-20 DIAGNOSIS — E87.1 HYPONATREMIA: ICD-10-CM

## 2023-10-20 DIAGNOSIS — I77.810 DILATATION OF THORACIC AORTA (HCC): ICD-10-CM

## 2023-10-20 DIAGNOSIS — J15.9 PNEUMONIA, BACTERIAL: ICD-10-CM

## 2023-10-20 DIAGNOSIS — J18.9 MULTIFOCAL PNEUMONIA: ICD-10-CM

## 2023-10-20 LAB
ALBUMIN SERPL BCP-MCNC: 3.8 G/DL (ref 3.5–5)
ALP SERPL-CCNC: 56 U/L (ref 34–104)
ALT SERPL W P-5'-P-CCNC: 17 U/L (ref 7–52)
ANION GAP SERPL CALCULATED.3IONS-SCNC: 9 MMOL/L
AST SERPL W P-5'-P-CCNC: 16 U/L (ref 13–39)
BASOPHILS # BLD AUTO: 0.02 THOUSANDS/ÂΜL (ref 0–0.1)
BASOPHILS NFR BLD AUTO: 0 % (ref 0–1)
BILIRUB SERPL-MCNC: 0.73 MG/DL (ref 0.2–1)
BUN SERPL-MCNC: 16 MG/DL (ref 5–25)
CALCIUM SERPL-MCNC: 8.8 MG/DL (ref 8.4–10.2)
CHLORIDE SERPL-SCNC: 95 MMOL/L (ref 96–108)
CO2 SERPL-SCNC: 26 MMOL/L (ref 21–32)
CREAT SERPL-MCNC: 1.11 MG/DL (ref 0.6–1.3)
EOSINOPHIL # BLD AUTO: 0.03 THOUSAND/ÂΜL (ref 0–0.61)
EOSINOPHIL NFR BLD AUTO: 0 % (ref 0–6)
ERYTHROCYTE [DISTWIDTH] IN BLOOD BY AUTOMATED COUNT: 13.8 % (ref 11.6–15.1)
FLUAV RNA RESP QL NAA+PROBE: NEGATIVE
FLUBV RNA RESP QL NAA+PROBE: NEGATIVE
GFR SERPL CREATININE-BSD FRML MDRD: 62 ML/MIN/1.73SQ M
GLUCOSE SERPL-MCNC: 185 MG/DL (ref 65–140)
HCT VFR BLD AUTO: 34.9 % (ref 36.5–49.3)
HGB BLD-MCNC: 11.9 G/DL (ref 12–17)
IMM GRANULOCYTES # BLD AUTO: 0.1 THOUSAND/UL (ref 0–0.2)
IMM GRANULOCYTES NFR BLD AUTO: 1 % (ref 0–2)
LYMPHOCYTES # BLD AUTO: 1.3 THOUSANDS/ÂΜL (ref 0.6–4.47)
LYMPHOCYTES NFR BLD AUTO: 10 % (ref 14–44)
MCH RBC QN AUTO: 30.7 PG (ref 26.8–34.3)
MCHC RBC AUTO-ENTMCNC: 34.1 G/DL (ref 31.4–37.4)
MCV RBC AUTO: 90 FL (ref 82–98)
MONOCYTES # BLD AUTO: 1.71 THOUSAND/ÂΜL (ref 0.17–1.22)
MONOCYTES NFR BLD AUTO: 13 % (ref 4–12)
NEUTROPHILS # BLD AUTO: 10.48 THOUSANDS/ÂΜL (ref 1.85–7.62)
NEUTS SEG NFR BLD AUTO: 76 % (ref 43–75)
NRBC BLD AUTO-RTO: 0 /100 WBCS
PLATELET # BLD AUTO: 207 THOUSANDS/UL (ref 149–390)
PMV BLD AUTO: 10.6 FL (ref 8.9–12.7)
POTASSIUM SERPL-SCNC: 4.2 MMOL/L (ref 3.5–5.3)
PROCALCITONIN SERPL-MCNC: 0.08 NG/ML
PROT SERPL-MCNC: 6.2 G/DL (ref 6.4–8.4)
RBC # BLD AUTO: 3.88 MILLION/UL (ref 3.88–5.62)
RSV RNA RESP QL NAA+PROBE: NEGATIVE
SARS-COV-2 RNA RESP QL NAA+PROBE: NEGATIVE
SODIUM SERPL-SCNC: 130 MMOL/L (ref 135–147)
WBC # BLD AUTO: 13.64 THOUSAND/UL (ref 4.31–10.16)

## 2023-10-20 PROCEDURE — 96365 THER/PROPH/DIAG IV INF INIT: CPT

## 2023-10-20 PROCEDURE — 0241U HB NFCT DS VIR RESP RNA 4 TRGT: CPT

## 2023-10-20 PROCEDURE — 71250 CT THORAX DX C-: CPT

## 2023-10-20 PROCEDURE — 87040 BLOOD CULTURE FOR BACTERIA: CPT

## 2023-10-20 PROCEDURE — 36415 COLL VENOUS BLD VENIPUNCTURE: CPT

## 2023-10-20 PROCEDURE — 80053 COMPREHEN METABOLIC PANEL: CPT

## 2023-10-20 PROCEDURE — G1004 CDSM NDSC: HCPCS

## 2023-10-20 PROCEDURE — 99285 EMERGENCY DEPT VISIT HI MDM: CPT | Performed by: EMERGENCY MEDICINE

## 2023-10-20 PROCEDURE — 84145 PROCALCITONIN (PCT): CPT

## 2023-10-20 PROCEDURE — 87081 CULTURE SCREEN ONLY: CPT

## 2023-10-20 PROCEDURE — 85025 COMPLETE CBC W/AUTO DIFF WBC: CPT

## 2023-10-20 PROCEDURE — 99285 EMERGENCY DEPT VISIT HI MDM: CPT

## 2023-10-20 RX ORDER — LISINOPRIL 20 MG/1
20 TABLET ORAL DAILY
Status: CANCELLED | OUTPATIENT
Start: 2023-10-21

## 2023-10-20 RX ORDER — METOPROLOL SUCCINATE 25 MG/1
25 TABLET, EXTENDED RELEASE ORAL EVERY MORNING
Status: CANCELLED | OUTPATIENT
Start: 2023-10-21

## 2023-10-20 RX ORDER — BUPROPION HYDROCHLORIDE 150 MG/1
300 TABLET ORAL EVERY MORNING
Status: CANCELLED | OUTPATIENT
Start: 2023-10-21

## 2023-10-20 RX ORDER — ALBUTEROL SULFATE 2.5 MG/3ML
2.5 SOLUTION RESPIRATORY (INHALATION) EVERY 4 HOURS PRN
Status: DISCONTINUED | OUTPATIENT
Start: 2023-10-20 | End: 2023-10-21

## 2023-10-20 RX ADMIN — AZITHROMYCIN MONOHYDRATE 500 MG: 500 INJECTION, POWDER, LYOPHILIZED, FOR SOLUTION INTRAVENOUS at 22:09

## 2023-10-20 RX ADMIN — SODIUM CHLORIDE 3 G: 9 INJECTION, SOLUTION INTRAVENOUS at 23:13

## 2023-10-21 PROBLEM — J18.9 MULTIFOCAL PNEUMONIA: Status: ACTIVE | Noted: 2023-10-21

## 2023-10-21 PROBLEM — I77.819 AORTIC ECTASIA (HCC): Status: ACTIVE | Noted: 2023-10-21

## 2023-10-21 PROBLEM — R06.09 DYSPNEA ON EXERTION: Status: ACTIVE | Noted: 2023-10-21

## 2023-10-21 LAB
ERYTHROCYTE [DISTWIDTH] IN BLOOD BY AUTOMATED COUNT: 13.6 % (ref 11.6–15.1)
HCT VFR BLD AUTO: 33.9 % (ref 36.5–49.3)
HGB BLD-MCNC: 11.3 G/DL (ref 12–17)
LACTATE SERPL-SCNC: 0.5 MMOL/L (ref 0.5–2)
MCH RBC QN AUTO: 30.1 PG (ref 26.8–34.3)
MCHC RBC AUTO-ENTMCNC: 33.3 G/DL (ref 31.4–37.4)
MCV RBC AUTO: 90 FL (ref 82–98)
PLATELET # BLD AUTO: 197 THOUSANDS/UL (ref 149–390)
PLATELET # BLD AUTO: 197 THOUSANDS/UL (ref 149–390)
PMV BLD AUTO: 10.8 FL (ref 8.9–12.7)
PMV BLD AUTO: 10.8 FL (ref 8.9–12.7)
RBC # BLD AUTO: 3.76 MILLION/UL (ref 3.88–5.62)
WBC # BLD AUTO: 12.8 THOUSAND/UL (ref 4.31–10.16)

## 2023-10-21 PROCEDURE — 99222 1ST HOSP IP/OBS MODERATE 55: CPT | Performed by: INTERNAL MEDICINE

## 2023-10-21 PROCEDURE — 85027 COMPLETE CBC AUTOMATED: CPT

## 2023-10-21 PROCEDURE — 97163 PT EVAL HIGH COMPLEX 45 MIN: CPT

## 2023-10-21 PROCEDURE — 85049 AUTOMATED PLATELET COUNT: CPT

## 2023-10-21 PROCEDURE — 83605 ASSAY OF LACTIC ACID: CPT

## 2023-10-21 RX ORDER — ENOXAPARIN SODIUM 100 MG/ML
40 INJECTION SUBCUTANEOUS DAILY
Status: DISCONTINUED | OUTPATIENT
Start: 2023-10-21 | End: 2023-10-22 | Stop reason: HOSPADM

## 2023-10-21 RX ORDER — ATORVASTATIN CALCIUM 40 MG/1
40 TABLET, FILM COATED ORAL
Status: DISCONTINUED | OUTPATIENT
Start: 2023-10-21 | End: 2023-10-22 | Stop reason: HOSPADM

## 2023-10-21 RX ORDER — AZITHROMYCIN 250 MG/1
500 TABLET, FILM COATED ORAL EVERY 24 HOURS
Status: DISCONTINUED | OUTPATIENT
Start: 2023-10-21 | End: 2023-10-22 | Stop reason: HOSPADM

## 2023-10-21 RX ORDER — TAMSULOSIN HYDROCHLORIDE 0.4 MG/1
0.4 CAPSULE ORAL 2 TIMES DAILY
Status: DISCONTINUED | OUTPATIENT
Start: 2023-10-21 | End: 2023-10-22 | Stop reason: HOSPADM

## 2023-10-21 RX ORDER — GUAIFENESIN 600 MG/1
600 TABLET, EXTENDED RELEASE ORAL EVERY 12 HOURS SCHEDULED
Status: DISCONTINUED | OUTPATIENT
Start: 2023-10-21 | End: 2023-10-22 | Stop reason: HOSPADM

## 2023-10-21 RX ORDER — BUPROPION HYDROCHLORIDE 150 MG/1
150 TABLET ORAL DAILY
Status: DISCONTINUED | OUTPATIENT
Start: 2023-10-21 | End: 2023-10-22 | Stop reason: HOSPADM

## 2023-10-21 RX ORDER — LOSARTAN POTASSIUM 25 MG/1
25 TABLET ORAL
Status: DISCONTINUED | OUTPATIENT
Start: 2023-10-21 | End: 2023-10-22 | Stop reason: HOSPADM

## 2023-10-21 RX ADMIN — SODIUM CHLORIDE 3 G: 9 INJECTION, SOLUTION INTRAVENOUS at 22:09

## 2023-10-21 RX ADMIN — AZITHROMYCIN DIHYDRATE 500 MG: 250 TABLET ORAL at 22:09

## 2023-10-21 RX ADMIN — GUAIFENESIN 600 MG: 600 TABLET, EXTENDED RELEASE ORAL at 16:48

## 2023-10-21 RX ADMIN — LOSARTAN POTASSIUM 25 MG: 25 TABLET, FILM COATED ORAL at 01:25

## 2023-10-21 RX ADMIN — LOSARTAN POTASSIUM 25 MG: 25 TABLET, FILM COATED ORAL at 22:09

## 2023-10-21 RX ADMIN — SODIUM CHLORIDE 3 G: 9 INJECTION, SOLUTION INTRAVENOUS at 11:05

## 2023-10-21 RX ADMIN — ATORVASTATIN CALCIUM 40 MG: 40 TABLET, FILM COATED ORAL at 16:58

## 2023-10-21 RX ADMIN — ENOXAPARIN SODIUM 40 MG: 40 INJECTION SUBCUTANEOUS at 09:30

## 2023-10-21 RX ADMIN — SODIUM CHLORIDE 3 G: 9 INJECTION, SOLUTION INTRAVENOUS at 05:47

## 2023-10-21 RX ADMIN — TAMSULOSIN HYDROCHLORIDE 0.4 MG: 0.4 CAPSULE ORAL at 16:49

## 2023-10-21 RX ADMIN — BUPROPION HYDROCHLORIDE 150 MG: 150 TABLET, FILM COATED, EXTENDED RELEASE ORAL at 09:30

## 2023-10-21 RX ADMIN — TAMSULOSIN HYDROCHLORIDE 0.4 MG: 0.4 CAPSULE ORAL at 09:35

## 2023-10-21 RX ADMIN — GUAIFENESIN 600 MG: 600 TABLET, EXTENDED RELEASE ORAL at 22:09

## 2023-10-21 RX ADMIN — SODIUM CHLORIDE 3 G: 9 INJECTION, SOLUTION INTRAVENOUS at 16:49

## 2023-10-21 NOTE — ASSESSMENT & PLAN NOTE
Patient with history of CLL, mild persistent asthma, STEFANY, HTN who reports 2 weeks of worsening nonproductive cough, acute on chronic dyspnea, and generalized feelings of being unwell. No fever/chills, nausea/vomiting, diarrhea. No chest pain or palpitations. Lives with wife at home. No recent hospitalizations or antibiotics use. Closely follows with pulmonology and cardiology as an outpatient. VSS, HDS on arrival.  Ill-appearing but in no acute distress. Lungs with decreased airway entry diffusely, heart RRR. Workup in ED significant for leukocytosis 13.6, Hgb 11.9, ; sodium 130, at baseline; chemistries otherwise normal. Pro-Calc/lactate negative. CT chest (10/20)-ordered by PCP, showing bilateral multifocal pneumonia and small pleural effusions   Blood cultures x2, MRSA culture obtained. Given dose of IV Unasyn 3 g once, azithromycin 500 mg once. Patient allergic to ceftriaxone (liver toxicity)    WBC downtrending, afebrile  Saturating well on RA, ambulatory      Plan:  IV Unasyn 3 g q6h, Azithromycin 500 mg daily day 2  Likely switch to oral Augmentin/Azithro  Based on our discussion with infectious disease, we will switch the patient to oral Augmentin and azithromycin to complete a total antibiotic course of 7 days. We had reached out to infectious disease due to this patient's history of immunocompromise and therefore we wanted to know the duration and regimen of antibiotics to prescribed upon discharge.   Blood cultures with no growth at 24 hours  Based on patient's request and our plan with the patient, will plan for discharge later today  Mucinex for cough  Respiratory protocol  Pulmonary hygiene including use of incentive spirometry, bed to chair at regular intervals  PT/OT

## 2023-10-21 NOTE — ED PROVIDER NOTES
Chief Complaint   Patient presents with    Shortness of Breath     Sob with exertion. Dx with b/l pneumonia today after being sick with a cough for a couple weeks. Needs IV abx. Weakness in legs as well     History of Present Illness and Review of Systems   This is a 82 y.o. male with PMH significant for CLL on chemotherapy, HTN, HLD coming in today with complaint of shortness of breath. The patient has been having a cough for the last several weeks. Reports generalized weakness that began worsening over the last several days. His PCP ordered a CT of his chest which was concerning for multifocal pneumonia. He was advised to come here for recommendations for IV antibiotics and admission. The patient himself currently feels fatigued. Denies any diamond hemoptysis nausea vomiting or diaphoresis. Denies any chest pain. No concerns for leg swelling or fluid overload. No history of blood clots. Does not have any recent hospitalizations or surgeries. He has no history of MRSA or Pseudomonas infections. No other symptoms currently. CT chest reviewed, shows multifocal pneumonia at the bilateral lung bases    - No language barrier. No other complaints for this encounter. Remainder of ROS Reviewed and Non-Pertinent    Past Medical, Past Surgical History:    has a past medical history of Arthritis, BPH (benign prostatic hyperplasia), Cancer ([address]), Chronic lymphocytic leukemia (CLL), B-cell ([address]), CPAP (continuous positive airway pressure) dependence, Depression, Hearing aid worn, Hyperlipidemia, Hypertension, Sinusitis, Sleep apnea, Tinnitus, and Wears glasses. has a past surgical history that includes Tonsillectomy; Cervical discectomy (1982); Rotator cuff repair (Left, 2006); Carpal tunnel release (Bilateral); Back surgery (09/2017); Colonoscopy; Cataract extraction w/ intraocular lens implant (Left, 12/11/2017);  Hernia repair; Cataract extraction; IR lower extremity / intervention (12/2/2019); pr excision malignant lesion f/e/e/n/l 1.1-2.0 cm (Right, 2022); and pr xcapsl ctrc rmvl insj io lens prosth w/o ecp (Right, 2022). Allergies: Allergies   Allergen Reactions    Rocephin [Ceftriaxone]      Avoids d/t previous liver toxicity       Social and Family History:     Social History     Substance and Sexual Activity   Alcohol Use Yes    Alcohol/week: 7.0 standard drinks of alcohol    Types: 7 Glasses of wine per week    Comment: occ     Social History     Tobacco Use   Smoking Status Former    Packs/day: 1.00    Years: 20.00    Total pack years: 20.00    Types: Cigarettes    Quit date:     Years since quittin.8   Smokeless Tobacco Never     Social History     Substance and Sexual Activity   Drug Use No       Physical Examination     Vitals:    10/20/23 2127 10/20/23 2130 10/20/23 2305   BP: 157/69  132/69   Pulse: 95  83   Resp: 20  20   Temp:  98.8 °F (37.1 °C)    SpO2: 95%  96%       Physical Exam  Vitals and nursing note reviewed. Constitutional:       General: He is not in acute distress. Appearance: He is well-developed. HENT:      Head: Normocephalic and atraumatic. Eyes:      Conjunctiva/sclera: Conjunctivae normal.   Cardiovascular:      Rate and Rhythm: Normal rate and regular rhythm. Heart sounds: No murmur heard. Pulmonary:      Effort: Pulmonary effort is normal. No respiratory distress. Breath sounds: Examination of the right-lower field reveals decreased breath sounds. Examination of the left-lower field reveals decreased breath sounds. Decreased breath sounds present. Abdominal:      Palpations: Abdomen is soft. Tenderness: There is no abdominal tenderness. Musculoskeletal:         General: No swelling. Normal range of motion. Cervical back: Neck supple. Right lower leg: No edema. Left lower leg: No edema. Skin:     General: Skin is warm and dry. Capillary Refill: Capillary refill takes less than 2 seconds. Neurological:      General: No focal deficit present. Mental Status: He is alert. Cranial Nerves: No cranial nerve deficit. Psychiatric:         Mood and Affect: Mood normal.           Procedures   Procedures      MDM:   Medical Decision Making  Margarita Melgar is a 82 y.o. who presents with complaints of shortness of breath    Vital signs are stable, physical exam shows the patient appears chronically ill however no respiratory distress, diminished lung sounds at the bases, heart sounds are regular, no abdominal tenderness, 2+ pulses in all 4 extremities no neurologic deficits    Ddx: Pneumonia versus viral illness. Doubtful related to cardiac etiology. No evidence of fluid overload or otherwise. Plan: Workup will include CBC, CMP, Pro-Emmanuel, MRSA swab, blood cultures, Unasyn and azithromycin for CAP treatment. Will plan on admission for complicated moderate severity pneumonia. Discussed the patient's case with the SOD service who agreed to admit the patient for further care and evaluation. The patient was also stable throughout their ED course and suffered from no acute changes and had no further questions or concerns from the ED team's standpoint. Amount and/or Complexity of Data Reviewed  Labs: ordered. Risk  Decision regarding hospitalization.        - Reviewed relevant past office visits/hospitalizations/procedures  -Obtained pertinent history that influenced decision making from the patients family        Final Dispo   Final Diagnosis:  1. Dyspnea    2. Multifocal pneumonia    3.  Hyponatremia      Time reflects when diagnosis was documented in both MDM as applicable and the Disposition within this note       Time User Action Codes Description Comment    10/20/2023 10:52 PM Gi Clark Add [R06.00] Dyspnea     10/20/2023 10:52 PM Gi Clark Add [J18.9] Multifocal pneumonia     10/20/2023 10:52 PM Gi Clark Add [E87.1] Hyponatremia           ED Disposition       ED Disposition   Admit    Condition   Stable    Date/Time   Fri Oct 20, 2023 10:52 PM    Comment   Case was discussed with SOD and the patient's admission status was agreed to be Admission Status: inpatient status to the service of Dr. Noel Avilez .                Follow-up Information    None       Medications   ampicillin-sulbactam (UNASYN) 3 g in sodium chloride 0.9 % 100 mL IVPB (has no administration in time range)   azithromycin (ZITHROMAX) 500 mg in sodium chloride 0.9% 250mL IVPB 500 mg (has no administration in time range)   Acalabrutinib CAPS 1 capsule (has no administration in time range)   buPROPion (WELLBUTRIN XL) 24 hr tablet 150 mg (has no administration in time range)   losartan (COZAAR) tablet 25 mg (has no administration in time range)   atorvastatin (LIPITOR) tablet 40 mg (has no administration in time range)   tamsulosin (FLOMAX) capsule 0.4 mg (has no administration in time range)   enoxaparin (LOVENOX) subcutaneous injection 40 mg (has no administration in time range)   ampicillin-sulbactam (UNASYN) 3 g in sodium chloride 0.9 % 100 mL IVPB (3 g Intravenous New Bag 10/20/23 2313)   azithromycin (ZITHROMAX) 500 mg in sodium chloride 0.9% 250mL IVPB 500 mg (0 mg Intravenous Stopped 10/20/23 2312)       Risk Stratification Tools                Orders Placed This Encounter   Procedures    FLU/RSV/COVID - if FLU/RSV clinically relevant    MRSA culture    Blood culture #1    Blood culture #2    CBC and differential    Comprehensive metabolic panel    Procalcitonin    CBC and Platelet, AM Draw, Tomorrow    Lactic acid, plasma (w/reflex if result > 2.0)    Platelet count    Diet Regular; Regular House    Insert peripheral IV    Up and OOB as tolerated    Elevate Head of Bed 30 degrees or greater    Oral care    Nursing Communication Provide patient with education on Pneumonia Zone Tool and document in the Education activity    Incentive spirometry    Out of bed to chair    Daily weights    Vital Signs per unit routine    Apply SCD or Foot pumps    Level 1-Full Code: all life saving measures are indicated    Inpatient consult to Case Management    Respiratory Protocol    INPATIENT ADMISSION    Aspiration precautions       Labs:     Labs Reviewed   CBC AND DIFFERENTIAL - Abnormal       Result Value Ref Range Status    WBC 13.64 (*) 4.31 - 10.16 Thousand/uL Final    RBC 3.88  3.88 - 5.62 Million/uL Final    Hemoglobin 11.9 (*) 12.0 - 17.0 g/dL Final    Hematocrit 34.9 (*) 36.5 - 49.3 % Final    MCV 90  82 - 98 fL Final    MCH 30.7  26.8 - 34.3 pg Final    MCHC 34.1  31.4 - 37.4 g/dL Final    RDW 13.8  11.6 - 15.1 % Final    MPV 10.6  8.9 - 12.7 fL Final    Platelets 973  893 - 390 Thousands/uL Final    nRBC 0  /100 WBCs Final    Neutrophils Relative 76 (*) 43 - 75 % Final    Immat GRANS % 1  0 - 2 % Final    Lymphocytes Relative 10 (*) 14 - 44 % Final    Monocytes Relative 13 (*) 4 - 12 % Final    Eosinophils Relative 0  0 - 6 % Final    Basophils Relative 0  0 - 1 % Final    Neutrophils Absolute 10.48 (*) 1.85 - 7.62 Thousands/µL Final    Immature Grans Absolute 0.10  0.00 - 0.20 Thousand/uL Final    Lymphocytes Absolute 1.30  0.60 - 4.47 Thousands/µL Final    Monocytes Absolute 1.71 (*) 0.17 - 1.22 Thousand/µL Final    Eosinophils Absolute 0.03  0.00 - 0.61 Thousand/µL Final    Basophils Absolute 0.02  0.00 - 0.10 Thousands/µL Final   COMPREHENSIVE METABOLIC PANEL - Abnormal    Sodium 130 (*) 135 - 147 mmol/L Final    Potassium 4.2  3.5 - 5.3 mmol/L Final    Chloride 95 (*) 96 - 108 mmol/L Final    CO2 26  21 - 32 mmol/L Final    ANION GAP 9  mmol/L Final    BUN 16  5 - 25 mg/dL Final    Creatinine 1.11  0.60 - 1.30 mg/dL Final    Comment: Standardized to IDMS reference method    Glucose 185 (*) 65 - 140 mg/dL Final    Comment: If the patient is fasting, the ADA then defines impaired fasting glucose as > 100 mg/dL and diabetes as > or equal to 123 mg/dL.     Calcium 8.8  8.4 - 10.2 mg/dL Final    AST 16  13 - 39 U/L Final ALT 17  7 - 52 U/L Final    Comment: Specimen collection should occur prior to Sulfasalazine administration due to the potential for falsely depressed results. Alkaline Phosphatase 56  34 - 104 U/L Final    Total Protein 6.2 (*) 6.4 - 8.4 g/dL Final    Albumin 3.8  3.5 - 5.0 g/dL Final    Total Bilirubin 0.73  0.20 - 1.00 mg/dL Final    Comment: Use of this assay is not recommended for patients undergoing treatment with eltrombopag due to the potential for falsely elevated results. N-acetyl-p-benzoquinone imine (metabolite of Acetaminophen) will generate erroneously low results in samples for patients that have taken an overdose of Acetaminophen. eGFR 62  ml/min/1.73sq m Final    Narrative:     [place] guidelines for Chronic Kidney Disease (CKD):     Stage 1 with normal or high GFR (GFR > 90 mL/min/1.73 square meters)    Stage 2 Mild CKD (GFR = 60-89 mL/min/1.73 square meters)    Stage 3A Moderate CKD (GFR = 45-59 mL/min/1.73 square meters)    Stage 3B Moderate CKD (GFR = 30-44 mL/min/1.73 square meters)    Stage 4 Severe CKD (GFR = 15-29 mL/min/1.73 square meters)    Stage 5 End Stage CKD (GFR <15 mL/min/1.73 square meters)  Note: GFR calculation is accurate only with a steady state creatinine   COVID19, INFLUENZA A/B, RSV PCR, [facility] - Normal    SARS-CoV-2 Negative  Negative Final    Comment:      INFLUENZA A PCR Negative  Negative Final    Comment:      INFLUENZA B PCR Negative  Negative Final    Comment:      RSV PCR Negative  Negative Final    Comment:      Narrative:     FOR PEDIATRIC PATIENTS - copy/paste COVID Guidelines URL to browser: [email or web address]    SARS-CoV-2 assay is a Nucleic Acid Amplification assay intended for the  qualitative detection of nucleic acid from SARS-CoV-2 in nasopharyngeal  swabs. Results are for the presumptive identification of SARS-CoV-2 RNA.     Positive results are indicative of infection with SARS-CoV-2, the virus  causing COVID-19, but do not rule out bacterial infection or co-infection  with other viruses. Laboratories within the [facility] and its  territories are required to report all positive results to the appropriate  public health authorities. Negative results do not preclude SARS-CoV-2  infection and should not be used as the sole basis for treatment or other  patient management decisions. Negative results must be combined with  clinical observations, patient history, and epidemiological information. This test has not been FDA cleared or approved. This test has been authorized by FDA under an Emergency Use Authorization  (EUA). This test is only authorized for the duration of time the  declaration that circumstances exist justifying the authorization of the  emergency use of an in vitro diagnostic tests for detection of SARS-CoV-2  virus and/or diagnosis of COVID-19 infection under section 564(b)(1) of  the Act, 21 U. S.C. 441QKK-6(L)(5), unless the authorization is terminated  or revoked sooner. The test has been validated but independent review by FDA  and CLIA is pending. Test performed using [employer]: This RT-PCR assay targets N2,  a region unique to SARS-CoV-2. A conserved region in the E-gene was chosen  for pan-Sarbecovirus detection which includes SARS-CoV-2. According to CMS-2020-01-R, this platform meets the definition of high-throughput technology. PROCALCITONIN TEST - Normal    Procalcitonin 0.08  <=0.25 ng/ml Final    Comment: Suspected Lower Respiratory Tract Infection (LRTI):  - LESS than or EQUAL to 0.25 ng/mL:   low likelihood for bacterial LRTI; antibiotics DISCOURAGED.  - GREATER than 0.25 ng/mL:   increased likelihood for bacterial LRTI; antibiotics ENCOURAGED. Suspected Sepsis:  - Strongly consider initiating antibiotics in ALL UNSTABLE patients.   - LESS than or EQUAL to 0.5 ng/mL:   low likelihood for bacterial sepsis; antibiotics DISCOURAGED.  - GREATER than 0.5 ng/mL:   increased likelihood for bacterial sepsis; antibiotics ENCOURAGED.  - GREATER than 2 ng/mL:   high risk for severe sepsis / septic shock; antibiotics strongly ENCOURAGED. Decisions on antibiotic use should not be based solely on Procalcitonin (PCT) levels. If PCT is low but uncertainty exists with stopping antibiotics, repeat PCT in 6-24 hours to confirm the low level. If antibiotics are administered (regardless if initial PCT was high or low), repeat PCT every 1-2 days to consider early antibiotic cessation (when GREATER than 80% decrease from the peak OR when PCT drops below designated cutoffs, whichever comes first), so long as the infection is NOT one that typically requires prolonged treatment durations (e.g., bone/joint infections, endocarditis, Staph. aureus bacteremia).     Situations of FALSE-POSITIVE Procalcitonin values:  1) Newborns < 67 hours old  2) Massive stress from severe trauma / burns, major surgery, acute pancreatitis, cardiogenic / hemorrhagic shock, sickle cell crisis, or other organ perfusion abnormalities  3) Malaria and some Candidal infections  4) Treatment with agents that stimulate cytokines (e.g., OKT3, anti-lymphocyte globulins, alemtuzumab, IL-2, granulocyte transfusion [NOT GCSFs])  5) Chronic renal disease causes elevated baseline levels (consider GREATER than 0.75 ng/mL as an abnormal cut-off); initiating HD/CRRT may cause transient decreases  6) Paraneoplastic syndromes from medullary thyroid or SCLC, some forms of vasculitis, and acute pazyd-ob-ffop disease    Situations of FALSE-NEGATIVE Procalcitonin values:  1) Too early in clinical course for PCT to have reached its peak (may repeat in 6-24 hours to confirm low level)  2) Localized infection WITHOUT systemic (SIRS / sepsis) response (e.g., an abscess, osteomyelitis, cystitis)  3) Mycobacteria (e.g., Tuberculosis, MAC)  4) Cystic fibrosis exacerbations     MRSA CULTURE   BLOOD CULTURE   BLOOD CULTURE   LACTIC ACID, PLASMA (W/REFLEX IF RESULT > 2.0)   PLATELET COUNT       Imaging:     No orders to display      All details of the evaluation and treatment plan were made clear and additionally all questions and concerns were addressed while under my care. Portions of the record may have been created with voice recognition software. Occasional wrong word or "sound a like" substitutions may have occurred due to the inherent limitations of voice recognition software. Read the chart carefully and recognize, using context, where substitutions have occurred. The attending physician physically available and evaluated the above patient alongside myself.       Nura Rios MD  10/21/23 0285

## 2023-10-21 NOTE — ASSESSMENT & PLAN NOTE
Known history of CLL, chronically managed on acalabrutinib 100 mg twice a day and he has been tolerating that well without problem. Gets acalabrutinib from [facility]. Sees Dr. Jaden Ledesma at [facility].     Also follows with [facility]

## 2023-10-21 NOTE — H&P
INTERNAL MEDICINE RESIDENCY ADMISSION H&P     Name: Daja Freedman   Age & Sex: 82 y.o. 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 No fever/chills, nausea/vomiting, diarrhea. No chest pain or palpitations. Lives with wife at home. No recent hospitalizations or antibiotics use. Closely follows with pulmonology and cardiology as an outpatient. VSS, HDS on arrival.  Ill-appearing but in no acute distress. Lungs with decreased airway entry diffusely, heart RRR. Workup in ED significant for leukocytosis 13.6, Hgb 11.9, ; sodium 130, at baseline; chemistries otherwise normal. Pro-Calc/lactate negative. CT chest (10/20)-ordered by PCP, showing bilateral multifocal pneumonia and small pleural effusions   Blood cultures x2, MRSA culture obtained. Given dose of IV Unasyn 3 g once, azithromycin 500 mg once.   Patient allergic to ceftriaxone (liver toxicity)    Plan:  IV Unasyn 3 g q6h  Azithromycin 500 mg daily  Follow-up blood cultures, MRSA culture; adjust ABX as appropriate  Respiratory protocol  Pulmonary hygiene including use of incentive spirometry, bed to chair at regular intervals  PT/OT    Dyspnea on exertion  Assessment & Plan  Patient endorsing 2 weeks of acute on chronic dyspnea   Denies chest pain. No dyspnea at rest.  Has been evaluated for same as an outpatient. Has known hx of CLL, on acalabrutiinib, follows with  heme-onc  Follows with  pulmonology for mild persistent asthma, controlled on inhaler  Follows with [facility] cardiology. Per chart review, last TTE 3/2483 normal systolic function, no wall motion abnormalities or major valvular dysfunction. Last treadmill stress test 2/2023 negative for ischemia. Compliant on statin, home antihypertensives. Hgb low 11.9 on admission, baseline ~13. No s/s of bleed. CT chest (10/19) showing coronary calcifications. Suspect multifactorial etiology including age-related deconditioning, CLL, likely worsened by infectious etiologies note as above. Plan:  Refer to A&P for multifocal PNA  Trend CBC, consider further anemia workup if Hgb continues downward trend  Pt/Ot while in patient  Close PCP follow up advised    CLL (chronic lymphocytic leukemia) ([address] [facility])  Assessment & Plan  Known history of CLL, chronically managed on acalabrutinib 100 mg twice a day and he has been tolerating that well without problem. Gets acalabrutinib from [place]. Sees Dr. Oscar Cortez at [place]. Also follows with [facility] hematology        Mild intermittent asthma without complication  Assessment & Plan  Controled on fluticosone inhaler, prn albuterol rescue inhaler  Follows with  Pulmonary  Not in acute exacerbation clinically    Plan:  Continue home fluticasone inhaler  Pulmonary hygiene      Aortic ectasia (HCC)  Assessment & Plan  CT chest (10/19) with incidental finding of fusiform ectasia of the ascending thoracic aorta measuring up to 41 mm.      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 Dx with b/l pneumonia today after being sick with a cough for a couple weeks. Needs IV abx. Weakness in legs as well      HISTORY OF PRESENT ILLNESS     Cuca Nash is a 82y.o. 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 States he has been having cough and dyspnea for the past 2 weeks. PCP subsequently ordered CT imaging that showed findings consistent with multifocal pneumonia, which patient was advised ED evaluation for antibiotics and admission. Denies fevers or chills. Feels fatigued. Denies diamond hemoptysis. No chest pain or palpitations. No nausea/vomiting/diarrhea. Lives alone with wife. No known sick contacts. No other history of cardiopulmonary disease with exception of asthma. No history of PE. Recent surgeries, hospitalizations, history of MRSA/MDRO infections. Follows with [facility] cardiology. Per chart review, last TTE 2/0046 normal systolic function, no wall motion abnormalities or major valvular dysfunction. Last treadmill stress test 2/2023 negative for ischemia.   Lisinopril recently switched to losartan by cardiology. Patient is allergic to ceftriaxone (liver toxicity). ED course: VSS, HDS, appears chronically ill exam otherwise unremarkable. Work-up significant for leukocytosis 13.6, hemoglobin 11.9, ; sodium 130, at baseline; chemistries otherwise normal. Pro-Calc/lactate negative. COVID/RSV/Flu negative. Outpatient CT reviewed, showing bilateral multifocal pneumonia and small pleural effusions. Blood cultures x2, MRSA culture obtained. Given dose of IV Unasyn 3 g once, azithromycin 500 mg once. Will admit to [facility] for further evaluation management. REVIEW OF SYSTEMS     Review of Systems  All ROS negative unless otherwise stated in the HPI    OBJECTIVE     Vitals:    10/20/23 2130 10/20/23 2305 10/20/23 2332 10/21/23 0029   BP:  132/69  136/68   BP Location:    Right arm   Pulse:  83  80   Resp:  20  16   Temp: 98.8 °F (37.1 °C)   98 °F (36.7 °C)   TempSrc:    Oral   SpO2:  96%  97%   Weight:   71.8 kg (158 lb 4.6 oz) 71.8 kg (158 lb 4.6 oz)   Height:    5' 8" (1.727 m)      Temperature:   Temp (24hrs), Av.4 °F (36.9 °C), Min:98 °F (36.7 °C), Max:98.8 °F (37.1 °C)    Temperature: 98 °F (36.7 °C)  Intake & Output:  I/O       None          Weights:   IBW (Ideal Body Weight): 68.4 kg    Body mass index is 24.07 kg/m². Weight (last 2 days)       Date/Time Weight    10/21/23 0029 71.8 (158.29)    10/20/23 233 71.8 (158.29)          Physical Exam  Vitals and nursing note reviewed. Constitutional:       General: He is not in acute distress. Appearance: He is well-developed. He is ill-appearing. HENT:      Head: Normocephalic and atraumatic. Eyes:      Conjunctiva/sclera: Conjunctivae normal.   Cardiovascular:      Rate and Rhythm: Normal rate and regular rhythm. Heart sounds: No murmur heard. Pulmonary:      Effort: Pulmonary effort is normal. No respiratory distress. Comments: Decreased bs  Chest:      Chest wall: No tenderness.    Abdominal: Palpations: Abdomen is soft. Tenderness: There is no abdominal tenderness. Musculoskeletal:         General: No swelling. Cervical back: Neck supple. Skin:     General: Skin is warm and dry. Capillary Refill: Capillary refill takes less than 2 seconds. Neurological:      Mental Status: He is alert. Psychiatric:         Mood and Affect: Mood normal.         PAST MEDICAL HISTORY     Past Medical History:   Diagnosis Date    Arthritis     BPH (benign prostatic hyperplasia)     Cancer (HCC)     Chronic lymphocytic leukemia (CLL), B-cell (HCC)     CPAP (continuous positive airway pressure) dependence     Depression     Hearing aid worn     bilateral    Hyperlipidemia     controlled    Hypertension     controlled    Sinusitis     Sleep apnea     CPAP    Tinnitus     Wears glasses      PAST SURGICAL HISTORY     Past Surgical History:   Procedure Laterality Date    BACK SURGERY  09/2017    laminotomy L3,4 S1-    CARPAL TUNNEL RELEASE Bilateral     CATARACT EXTRACTION      left    CATARACT EXTRACTION W/ INTRAOCULAR LENS IMPLANT Left 12/11/2017    Procedure: EXTRACTION EXTRACAPSULAR CATARACT PHACO INTRAOCULAR LENS (IOL); Surgeon: Nunu Larson MD;  Location: [facility] MAIN OR;  Service: Ophthalmology    CERVICAL DISCECTOMY  1982    C5-6    COLONOSCOPY      HERNIA REPAIR      l inguinal    IR LOWER EXTREMITY / INTERVENTION  12/2/2019    WV EXCISION MALIGNANT LESION F/E/E/N/L 1.1-2.0 CM Right 1/11/2022    Procedure: EXCISION SKIN LESION CHEEK WITH FROZEN SECTION;  Surgeon: Dereje Malave MD;  Location: [facility];  Service: Plastics    WV XCAPSL CTRC RMVL INSJ IO LENS PROSTH W/O ECP Right 11/14/2022    Procedure: EXTRACTION EXTRACAPSULAR CATARACT PHACO INTRAOCULAR LENS (IOL);   Surgeon: Nunu Larson MD;  Location: [facility] MAIN OR;  Service: Ophthalmology    ROTATOR CUFF REPAIR Left 2006    TONSILLECTOMY       SOCIAL & FAMILY HISTORY     Social History     Substance and Sexual Activity   Alcohol Use Yes Alcohol/week: 7.0 standard drinks of alcohol    Types: 7 Glasses of wine per week    Comment: occ       Social History     Substance and Sexual Activity   Drug Use No     Social History     Tobacco Use   Smoking Status Former    Packs/day: 1.00    Years: 20.00    Total pack years: 20.00    Types: Cigarettes    Quit date:     Years since quittin.8   Smokeless Tobacco Never     Family History   Problem Relation Age of Onset    Cancer Mother         bladder    Heart disease Father     Stroke Father     Parkinsonism Brother      LABORATORY DATA     Labs: I have personally reviewed pertinent reports. Results from last 7 days   Lab Units 10/21/23  0021 10/20/23  2203   WBC Thousand/uL  --  13.64*   HEMOGLOBIN g/dL  --  11.9*   HEMATOCRIT %  --  34.9*   PLATELETS Thousands/uL 197 207   NEUTROS PCT %  --  76*   MONOS PCT %  --  13*   EOS PCT %  --  0      Results from last 7 days   Lab Units 10/20/23  2203   POTASSIUM mmol/L 4.2   CHLORIDE mmol/L 95*   CO2 mmol/L 26   BUN mg/dL 16   CREATININE mg/dL 1.11   CALCIUM mg/dL 8.8   ALK PHOS U/L 56   ALT U/L 17   AST U/L 16                  Results from last 7 days   Lab Units 10/21/23  0021   LACTIC ACID mmol/L 0.5         Micro:  No results found for: "BLOODCX", "URINECX", "WOUNDCULT", "SPUTUMCULTUR"  IMAGING & DIAGNOSTIC TESTS     Imaging: I have personally reviewed pertinent reports. CT chest wo contrast    Result Date: 10/20/2023  Impression: 1. Bilateral multifocal pneumonia and small pleural effusions. Follow-up to complete resolution recommended. 2. Ectasia of the ascending thoracic aorta. Follow-up evaluation is recommended in 1 year interval. The study was marked in [facility] for immediate notification. . Workstation performed: [id number]     EKG, Pathology, and Other Studies: I have personally reviewed pertinent reports.      ALLERGIES     Allergies   Allergen Reactions    Rocephin [Ceftriaxone]      Avoids d/t previous liver toxicity     MEDICATIONS PRIOR TO ARRIVAL     Prior to Admission medications    Medication Sig Start Date End Date Taking? Authorizing Provider   Acalabrutinib 100 MG CAPS Take 1 capsule by mouth 2 (two) times a day    Historical Provider, MD   albuterol (ProAir HFA) 90 mcg/act inhaler Inhale 2 puffs every 4 (four) hours as needed for wheezing 6/7/22   Sophia Pierce DO   Arnuity Ellipta 100 MCG/ACT AEPB inhaler Inhale 1 puff daily Rinse mouth after use.  6/7/22 11/14/22  Sophia Pierce DO   buPROPion [facility]) 150 mg 12 hr tablet  9/5/23   Historical Provider, MD   Calquence 100 MG TABS  4/24/23   Historical Provider, MD   fluticasone (Flovent HFA) 220 mcg/act inhaler Inhale 1 puff once daily Rinse mouth after use. 9/25/23   Sophia Pierce DO   immune globulin, human (Bivigam) 5 GM/50ML 15mg IV every 8 weeks 5/17/21   Historical Provider, MD   Immune Globulin, Human, (GAMUNEX-C IJ)     Historical Provider, MD   losartan (COZAAR) 25 mg tablet Take 25 mg by mouth daily at bedtime 8/22/23   Historical Provider, MD   Multiple Vitamins-Minerals (PRESERVISION AREDS 2 PO) Take by mouth 2 (two) times a day    Historical Provider, MD   rosuvastatin (CRESTOR) 20 MG tablet  4/18/22   Historical Provider, MD   tadalafil (CIALIS) 5 MG tablet daily at bedtime 4/5/19   Historical Provider, MD   tamsulosin (FLOMAX) 0.4 mg Take 0.4 mg by mouth 2 (two) times a day 7/20/23   Historical Provider, MD   amLODIPine (NORVASC) 10 mg tablet Take 10 mg by mouth every morning  Patient not taking: Reported on 1/10/2022   6/21/22  Historical Provider, MD   amoxicillin (AMOXIL) 500 mg capsule TAKE 4 CAPSULES BY MOUTH ONE HOUR PRIOR TO DENTAL APPOINTMENT 5/15/22 10/21/23  Historical Provider, MD   atropine (ISOPTO ATROPINE) 1 % ophthalmic solution INSTILL 1 DROP IN THE RIGHT EYE THREE TIMES DAILY 10/24/22 10/21/23  Historical Provider, MD   buPROPion (WELLBUTRIN XL) 300 mg 24 hr tablet Take 300 mg by mouth every morning  10/21/23  Historical Provider, MD Cholecalciferol (Vitamin D3) 50 MCG (2000 UT) capsule Take 2,000 Units by mouth daily  6/21/22  Historical Provider, MD   gatifloxacin (ZYMAXID) 0.5 % Administer 1 drop to the right eye 2 (two) times a day 11/14/22 10/21/23  Pavel Tineo MD   Ibrutinib 140 MG CAPS Take by mouth every morning  6/21/22  Historical Provider, MD   ketorolac (ACULAR) 0.5 % ophthalmic solution Administer 1 drop to the right eye 4 (four) times a day 11/14/22 10/21/23  Pavel Tineo MD   lisinopril (ZESTRIL) 20 mg tablet Take 20 mg by mouth daily Taking 1 qd  10/21/23  Historical Provider, MD   metoprolol succinate (TOPROL-XL) 25 mg 24 hr tablet Take 25 mg by mouth every morning Takes 1 1/12 a.m.   10/21/23  Historical Provider, MD   prednisoLONE acetate (PRED FORTE) 1 % ophthalmic suspension INSTILL 1 DROP IN THE RIGHT EYE FOUR TIMES DAILY 11/25/22 10/21/23  Historical Provider, MD     MEDICATIONS ADMINISTERED IN LAST 24 HOURS     Medication Administration - last 24 hours from 10/20/2023 0203 to 10/21/2023 0203         Date/Time Order Dose Route Action Action by     10/20/2023 2313 EDT ampicillin-sulbactam (UNASYN) 3 g in sodium chloride 0.9 % 100 mL IVPB 3 g Intravenous [address] Jerad Rodarte RN     10/20/2023 2312 EDT azithromycin (ZITHROMAX) 500 mg in sodium chloride 0.9% 250mL IVPB 500 mg 0 mg Intravenous Stopped Jerad Rodarte RN     10/20/2023 2209 EDT azithromycin (ZITHROMAX) 500 mg in sodium chloride 0.9% 250mL IVPB 500 mg 500 mg Intravenous New Bag Leanna Kris Lucero RN     10/21/2023 0125 EDT losartan (COZAAR) tablet 25 mg 25 mg Oral Given Jj Ch RN          CURRENT MEDICATIONS     Current Facility-Administered Medications   Medication Dose Route Frequency Provider Last Rate    Acalabrutinib  1 capsule Oral BID Amantwan Nowak DO      ampicillin-sulbactam  3 g Intravenous Q6H Amantwan Nowak DO      atorvastatin  40 mg Oral Daily With DO Jenifer      [START ON 10/22/2023] azithromycin  500 mg Oral Q24H Keyon Gordon, DO      buPROPion  150 mg Oral Daily Ammar Alam, DO      enoxaparin  40 mg Subcutaneous Daily Ammar Alam, DO      fluticasone  1 puff Inhalation Daily Ammar Alam, DO      losartan  25 mg Oral HS Ammar Alam, DO      tamsulosin  0.4 mg Oral BID Jeremias Tena,                Admission Time  I spent 30 minutes admitting the patient. This involved direct patient contact where I performed a full history and physical, reviewing previous records, and reviewing laboratory and other diagnostic studies. Portions of the record may have been created with voice recognition software. Occasional wrong word or "sound a like" substitutions may have occurred due to the inherent limitations of voice recognition software.   Read the chart carefully and recognize, using context, where substitutions have occurred.    ==  Tatyana Roberts Read, [facility]  Internal Medicine Residency PGY-2

## 2023-10-21 NOTE — CASE MANAGEMENT
Case Management Assessment & Discharge Planning Note    Patient name Lizet Gonzalez  Location GR4 875/NW8 436-17 MRN [id number]  : 1940 Date 10/21/2023       Current Admission Date: 10/20/2023  Current Admission Diagnosis:Multifocal pneumonia   Patient Active Problem List    Diagnosis Date Noted    Multifocal pneumonia 10/21/2023    Dyspnea on exertion 10/21/2023    Aortic ectasia ([address]) 10/21/2023    History of colon polyps 2023    CPAP (continuous positive airway pressure) dependence 2022    Other gastritis with bleeding 2022    Gastric polyp 2022    Hematemesis 2022    History of atrial fibrillation 2021    IgG deficiency ([address]) 2020    Hyperlipidemia 2019    PAD (peripheral artery disease) ([address]) 2019    Hypertension 2019    Cough 2019    Obstructive sleep apnea 2019    CLL (chronic lymphocytic leukemia) ([address]) 2019    Mild intermittent asthma without complication       LOS (days): 1  Geometric Mean LOS (GMLOS) (days):   Days to GMLOS:     OBJECTIVE:    Risk of Unplanned Readmission Score: 11.34         Current admission status: Inpatient       Preferred Pharmacy:   [address] [place] [address]  [address] [address]  Phone: [phone] Fax: [phone]    Primary Care Provider: Amy Dubois MD    Primary Insurance: MEDICARE  Secondary Insurance: AARP    ASSESSMENT:  Active Health Care Proxies    There are no active Health Care Proxies on file.                  Readmission Root Cause  30 Day Readmission: No    Patient Information  Admitted from[de-identified] Home  Mental Status: Alert  During Assessment patient was accompanied by: Not accompanied during assessment  Assessment information provided by[de-identified] Patient  Primary Caregiver: Self  Support Systems: Spouse/significant other  [place] of Residence: [facility] [address] [facility] do you live in?: [address] entry access options.  Select all that apply.: No steps to enter home  Type of Current Residence: [place]  In the last 12 months, was there a time when you were not able to pay the mortgage or rent on time?: No  In the last 12 months, was there a time when you did not have a steady place to sleep or slept in a shelter (including now)?: No  Homeless/housing insecurity resource given?: N/A  Living Arrangements: Lives w/ Spouse/significant other  Is patient a ?: Yes  Is patient active with [facility])?: No    Activities of Daily Living Prior to Admission  Functional Status: Independent  Completes ADLs independently?: Yes  Ambulates independently?: Yes  Does patient use assisted devices?: No  Does patient currently own DME?: No  Does patient have a history of Outpatient Therapy (PT/OT)?: No  Does the patient have a history of Short-Term Rehab?: No  Does patient have a history of HHC?: No  Does patient currently have [facility]?: No         Patient Information Continued  Income Source: Pension/intermediate  Does patient have prescription coverage?: Yes  Within the past 12 months, you worried that your food would run out before you got the money to buy more.: Never true  Within the past 12 months, the food you bought just didn't last and you didn't have money to get more.: Never true  Food insecurity resource given?: N/A  Does patient receive dialysis treatments?: No  Does patient have a history of substance abuse?: No  Does patient have a history of Mental Health Diagnosis?: No         Means of Transportation  Means of Transport to Appts[de-identified] Drives Self  In the past 12 months, has lack of transportation kept you from medical appointments or from getting medications?: No  In the past 12 months, has lack of transportation kept you from meetings, work, or from getting things needed for daily living?: No  Was application for public transport provided?: N/A        DISCHARGE DETAILS:    Discharge planning discussed with[de-identified] patient  Freedom of Choice: Yes  Comments - Freedom of Choice: FOC reviewed, no current recs at this time  CM contacted family/caregiver?: No- see comments (pt AAO)  Were Treatment Team discharge recommendations reviewed with patient/caregiver?: Yes  Did patient/caregiver verbalize understanding of patient care needs?: Yes  Were patient/caregiver advised of the risks associated with not following Treatment Team discharge recommendations?: Yes    Contacts  Patient Contacts: Aura Nicole- wife  Relationship to Patient[de-identified] Family  Contact Method: Phone  Phone Number: [phone]  Reason/Outcome: Emergency [address]         Is the patient interested in [address] at discharge?: No    DME Referral Provided  Referral made for DME?: No               Additional Comments: CM introduced self and role to patient at bedside. Patient resides with his wife in a ranch style home in [place], [place]. Patient reports he is IADL and ambulates independently, no hx of HHC, DME use or STR. Patient drives but wife will transport home. CM to continue to follow for d/c planning needs. Patient/caregiver received discharge checklist.  Content reviewed. Patient/caregiver encouraged to participate in discharge plan of care prior to discharge home. CM reviewed d/c planning process including the following: identifying help at home, patient preference for d/c planning needs, Discharge Lounge, [facility] Meds to Bed program, availability of treatment team to discuss questions or concerns patient and/or family may have regarding understanding medications and recognizing signs and symptoms once discharged. CM also encouraged patient to follow up with all recommended appointments after discharge. Patient advised of importance for patient and family to participate in managing patient’s medical well being.

## 2023-10-21 NOTE — ASSESSMENT & PLAN NOTE
CT chest (10/19) with incidental finding of fusiform ectasia of the ascending thoracic aorta measuring up to 41 mm.      Follow-up low radiation dose chest CT in one year

## 2023-10-21 NOTE — RESPIRATORY THERAPY NOTE
RT Protocol Note  Cammy Cogan 82 y.o. male MRN: [id number]  Unit/Bed#: ED 26 Encounter: [id number]    Assessment    Active Problems: There are no active Hospital Problems. Home Pulmonary Medications:  yes  Home Devices/Therapy: BiPAP/CPAP    Past Medical History:   Diagnosis Date    Arthritis     BPH (benign prostatic hyperplasia)     Cancer (HCC)     Chronic lymphocytic leukemia (CLL), B-cell (HCC)     CPAP (continuous positive airway pressure) dependence     Depression     Hearing aid worn     bilateral    Hyperlipidemia     controlled    Hypertension     controlled    Sinusitis     Sleep apnea     CPAP    Tinnitus     Wears glasses      Social History     Socioeconomic History    Marital status: /Civil Union     Spouse name: None    Number of children: None    Years of education: None    Highest education level: None   Occupational History    None   Tobacco Use    Smoking status: Former     Packs/day: 1.00     Years: 20.00     Total pack years: 20.00     Types: Cigarettes     Quit date:      Years since quittin.8    Smokeless tobacco: Never   Vaping Use    Vaping Use: Never used   Substance and Sexual Activity    Alcohol use:  Yes     Alcohol/week: 7.0 standard drinks of alcohol     Types: 7 Glasses of wine per week     Comment: occ    Drug use: No    Sexual activity: Yes     Partners: Female   Other Topics Concern    None   Social History Narrative    None     Social Determinants of Health     Financial Resource Strain: Not on file   Food Insecurity: Not on file   Transportation Needs: Not on file   Physical Activity: Not on file   Stress: Not on file   Social Connections: Not on file   Intimate Partner Violence: Not on file   Housing Stability: Not on file       Subjective         Objective    Physical Exam:   Assessment Type: Assess only  General Appearance: Alert, Awake  Respiratory Pattern: Normal  Chest Assessment: Chest expansion symmetrical  Bilateral Breath Sounds: Clear    Vitals:  Blood pressure 132/69, pulse 83, temperature 98.8 °F (37.1 °C), resp. rate 20, SpO2 96 %. Imaging and other studies:           Plan    Respiratory Plan: No distress/Pulmonary history  Airway Clearance Plan: Incentive Spirometer     Resp Comments: Assessment of Respiratory and Airway protocol. Pt came to ED for cough and shortness of breath. Pt has community acquired pneumonia. Start is; does 1250. Hx of Asthma and STEFANY. Takes MDI's at home. Refused to use Cpap. Will order prn Albuterol for wheezing.

## 2023-10-21 NOTE — ASSESSMENT & PLAN NOTE
Patient endorsing 2 weeks of acute on chronic dyspnea   Denies chest pain. No dyspnea at rest.  Has been evaluated for same as an outpatient. Has known hx of CLL, on acalabrutiinib, follows with  heme-onc  Follows with  pulmonology for mild persistent asthma, controlled on inhaler  Follows with [facility] cardiology. Per chart review, last TTE 9/2311 normal systolic function, no wall motion abnormalities or major valvular dysfunction. Last treadmill stress test 2/2023 negative for ischemia. Compliant on statin, home antihypertensives. Hgb low 11.9 on admission, baseline ~13. No s/s of bleed. CT chest (10/19) showing coronary calcifications. Suspect multifactorial etiology including age-related deconditioning, CLL, likely worsened by infectious etiologies note as above.     Plan:  Refer to A&P for multifocal PNA  Trend CBC, consider further anemia workup if Hgb continues downward trend  Pt/Ot while in patient  Close PCP follow up advised  Please refer to our plan under multifocal pneumonia

## 2023-10-21 NOTE — PHYSICAL THERAPY NOTE
Physical Therapy Evaluation     Patient's Name: Lizet Gonzalez    Admitting Diagnosis  Shortness of breath [R06.02]  Hyponatremia [E87.1]  Dyspnea [R06.00]  Multifocal pneumonia [J18.9]    Problem List  Patient Active Problem List   Diagnosis    Mild intermittent asthma without complication    Cough    Obstructive sleep apnea    CLL (chronic lymphocytic leukemia) (HCC)    Hyperlipidemia    PAD (peripheral artery disease) (HCC)    Hypertension    IgG deficiency (HCC)    History of atrial fibrillation    Hematemesis    CPAP (continuous positive airway pressure) dependence    Other gastritis with bleeding    Gastric polyp    History of colon polyps    Multifocal pneumonia    Dyspnea on exertion    Aortic ectasia (HCC)       Past Medical History  Past Medical History:   Diagnosis Date    Arthritis     BPH (benign prostatic hyperplasia)     Cancer (HCC)     Chronic lymphocytic leukemia (CLL), B-cell (HCC)     CPAP (continuous positive airway pressure) dependence     Depression     Hearing aid worn     bilateral    Hyperlipidemia     controlled    Hypertension     controlled    Sinusitis     Sleep apnea     CPAP    Tinnitus     Wears glasses        Past Surgical History  Past Surgical History:   Procedure Laterality Date    BACK SURGERY  09/2017    laminotomy L3,4 S1-    CARPAL TUNNEL RELEASE Bilateral     CATARACT EXTRACTION      left    CATARACT EXTRACTION W/ INTRAOCULAR LENS IMPLANT Left 12/11/2017    Procedure: EXTRACTION EXTRACAPSULAR CATARACT PHACO INTRAOCULAR LENS (IOL);   Surgeon: Alexander Borrego MD;  Location: [facility] MAIN OR;  Service: Ophthalmology    CERVICAL DISCECTOMY  1982    C5-6    COLONOSCOPY      HERNIA REPAIR      l inguinal    IR LOWER EXTREMITY / INTERVENTION  12/2/2019    DE EXCISION MALIGNANT LESION F/E/E/N/L 1.1-2.0 CM Right 1/11/2022    Procedure: EXCISION SKIN LESION CHEEK WITH FROZEN SECTION;  Surgeon: Scottie Razo MD;  Location: [facility];  Service: Plastics    DE XCAPSL CTRC RMVL INSJ IO LENS PROSTH W/O ECP Right 11/14/2022    Procedure: EXTRACTION EXTRACAPSULAR CATARACT PHACO INTRAOCULAR LENS (IOL); Surgeon: Marj Patel MD;  Location: [facility] MAIN OR;  Service: Ophthalmology    ROTATOR CUFF REPAIR Left 2006    TONSILLECTOMY          10/21/23 0844   PT Last Visit   PT Visit Date 10/21/23   Note Type   Note type Evaluation   Pain Assessment   Pain Assessment Tool 0-10   Pain Score No Pain   Restrictions/Precautions   Weight Bearing Precautions Per Order No   Other Precautions Multiple lines   Home Living   Type of [address] One level;Performs ADLs on one level;Stairs to enter without rails  (1 ELIS through garage)   Bathroom Shower/Tub Walk-in shower   [address] [facility]   (pt denies)   Bathroom Accessibility Accessible   Additional Comments PTA pt denies the use of DME   Prior Function   Level of [place] Independent with ADLs; Independent with functional mobility; Independent with IADLS   Lives With Spouse   Receives Help From Family   IADLs Independent with driving; Independent with meal prep; Independent with medication management   Falls in the last 6 months 0   Vocational Retired   Comments Pt enjoys woodworking   General   Family/Caregiver Present No   Cognition   Overall Cognitive Status WFL   Arousal/Participation Cooperative   Orientation Level Oriented X4   Memory Within functional limits   Following Commands Follows all commands and directions without difficulty   RLE Assessment   RLE Assessment WFL   LLE Assessment   LLE Assessment WFL   Bed Mobility   Additional Comments Pt found sitting EOB with breakfast try, returned at end of session   Transfers   Sit to Stand 7  Independent   Stand to Sit 7  Independent   Additional Comments no AD   Ambulation/Elevation   Gait pattern WNL; Wide NADEGE; Forward Flexion; Step through pattern   Gait Assistance 6  Modified independent   Assistive Device None   Distance 250'   Balance   Static Sitting Good   Dynamic Sitting Fair +   Static Standing Fair +   Dynamic Standing Fair +   Ambulatory Fair +   Activity Tolerance   Activity Tolerance Patient tolerated treatment well   Nurse Made Aware RN cleared and updated   Assessment   Prognosis Good   Problem List Decreased endurance;Decreased mobility   Assessment Pt seen for PT evaluation. Pt with active PT eval/treat orders. Pt is a 82 y.o. male who was admitted to [address] [place] [address] on 10/20/23. Pt's current dx/ problem list include: multifocal pneumonia. Comorbidities affecting pt's physical performance at time of assessment are as follows: has a past medical history of Arthritis, BPH (benign prostatic hyperplasia), Cancer (HCC), Chronic lymphocytic leukemia (CLL), B-cell ([address]), CPAP (continuous positive airway pressure) dependence, Depression, Hearing aid worn, Hyperlipidemia, Hypertension, Sinusitis, Sleep apnea, Tinnitus, and Wears glasses. Personal factors affecting pt at time of initial examination include: steps to enter environment, limited home support, past experience, inability to perform IADLs, inability to perform ADLs. Due to acute medical issues, ongoing medical workup for primary dx; decreased activity tolerance compared to baseline, increased assistance needed from caregiver at current time, multiple lines, decline in overall functional mobility status; health management issues. At baseline, pt resides alone in a 1  with 1 ELIS and was independent with ADLs/ IADLs. Currently, upon initial examination, pt is demonstrating independence with all functional mobility and transfers. No further acute PT needs at this time. Patient would benefit from continued mobilization from restorative and nursing staff. The patient's AM-PAC Basic Mobility Inpatient Short Form Raw Score is 23. A Raw score of greater than 16 suggests the patient may benefit from discharge to home. Please also refer to the recommendation of the Physical Therapist for safe discharge planning. Barriers to Discharge None   Goals   Patient Goals To eat breakfast   Plan   Treatment/Interventions ADL retraining;Functional transfer training;Bed mobility;Gait training;Spoke to nursing   PT Frequency Other (Comment)  (PT EVAL ONLY)   Discharge Recommendation   PT Discharge Recommendation No rehabilitation needs   AM-PAC Basic Mobility Inpatient   Turning in Flat Bed Without Bedrails 4   Lying on Back to Sitting on Edge of Flat Bed Without Bedrails 4   Moving Bed to Chair 4   Standing Up From Chair Using Arms 4   Walk in Room 4   Climb 3-5 Stairs With Railing 3   Basic Mobility Inpatient Raw Score 23   Basic Mobility Standardized Score 50.88   Highest Level Of Mobility   JH-HLM Goal 7: Walk 25 feet or more   JH-HLM Achieved 8: Walk 250 feet ot more           Emanuel Frazier, PT

## 2023-10-21 NOTE — ASSESSMENT & PLAN NOTE
Controled on fluticosone inhaler, prn albuterol rescue inhaler  Follows with [facility]  Not in acute exacerbation clinically    Plan:  Continue home fluticasone inhaler  Pulmonary hygiene

## 2023-10-21 NOTE — ED ATTENDING ATTESTATION
10/20/2023  IAdan MD, saw and evaluated the patient. I have discussed the patient with the resident/non-physician practitioner and agree with the resident's/non-physician practitioner's findings, Plan of Care, and MDM as documented in the resident's/non-physician practitioner's note, except where noted. All available labs and Radiology studies were reviewed. I was present for key portions of any procedure(s) performed by the resident/non-physician practitioner and I was immediately available to provide assistance. At this point I agree with the current assessment done in the Emergency Department. I have conducted an independent evaluation of this patient a history and physical is as follows:    ED Course     Patient presents for evaluation due to several weeks of cough and shortness of breath especially with exertion. Patient had an outpatient CT scan that was ordered by his PCP that showed bilateral pneumonia. Patient does report some generalized weakness but no additional complaints. A/P: Community-acquired pneumonia. Will check labs including Pro-Emmanuel and blood cultures. We will start empiric antibiotics.     Critical Care Time  Procedures

## 2023-10-21 NOTE — PLAN OF CARE
Problem: PAIN - ADULT  Goal: Verbalizes/displays adequate comfort level or baseline comfort level  Description: Interventions:  - Encourage patient to monitor pain and request assistance  - Assess pain using appropriate pain scale  - Administer analgesics based on type and severity of pain and evaluate response  - Implement non-pharmacological measures as appropriate and evaluate response  - Consider cultural and social influences on pain and pain management  - Notify physician/advanced practitioner if interventions unsuccessful or patient reports new pain  Outcome: Progressing     Problem: INFECTION - ADULT  Goal: Absence or prevention of progression during hospitalization  Description: INTERVENTIONS:  - Assess and monitor for signs and symptoms of infection  - Monitor lab/diagnostic results  - Monitor all insertion sites, i.e. indwelling lines, tubes, and drains  - Monitor endotracheal if appropriate and nasal secretions for changes in amount and color  - [place] appropriate cooling/warming therapies per order  - Administer medications as ordered  - Instruct and encourage patient and family to use good hand hygiene technique  - Identify and instruct in appropriate isolation precautions for identified infection/condition  Outcome: Progressing     Problem: SAFETY ADULT  Goal: Patient will remain free of falls  Description: INTERVENTIONS:  - Educate patient/family on patient safety including physical limitations  - Instruct patient to call for assistance with activity   - Consult OT/PT to assist with strengthening/mobility   - Keep Call bell within reach  - Keep bed low and locked with side rails adjusted as appropriate  - Keep care items and personal belongings within reach  - Initiate and maintain comfort rounds  - Make Fall Risk Sign visible to staff  - Apply yellow socks and bracelet for high fall risk patients  - Consider moving patient to room near nurses station  Outcome: Progressing     Problem: DISCHARGE PLANNING  Goal: Discharge to home or other facility with appropriate resources  Description: INTERVENTIONS:  - Identify barriers to discharge w/patient and caregiver  - Arrange for needed discharge resources and transportation as appropriate  - Identify discharge learning needs (meds, wound care, etc.)  - Arrange for interpretive services to assist at discharge as needed  - Refer to Case Management Department for coordinating discharge planning if the patient needs post-hospital services based on physician/advanced practitioner order or complex needs related to functional status, cognitive ability, or social support system  Outcome: Progressing     Problem: Knowledge Deficit  Goal: Patient/family/caregiver demonstrates understanding of disease process, treatment plan, medications, and discharge instructions  Description: Complete learning assessment and assess knowledge base.   Interventions:  - Provide teaching at level of understanding  - Provide teaching via preferred learning methods  Outcome: Progressing     Problem: RESPIRATORY - ADULT  Goal: Achieves optimal ventilation and oxygenation  Description: INTERVENTIONS:  - Assess for changes in respiratory status  - Assess for changes in mentation and behavior  - Position to facilitate oxygenation and minimize respiratory effort  - Oxygen administered by appropriate delivery if ordered  - Initiate smoking cessation education as indicated  - Encourage broncho-pulmonary hygiene including cough, deep breathe, Incentive Spirometry  - Assess the need for suctioning and aspirate as needed  - Assess and instruct to report SOB or any respiratory difficulty  - Respiratory Therapy support as indicated  Outcome: Progressing

## 2023-10-21 NOTE — PLAN OF CARE
Problem: PAIN - ADULT  Goal: Verbalizes/displays adequate comfort level or baseline comfort level  Description: Interventions:  - Encourage patient to monitor pain and request assistance  - Assess pain using appropriate pain scale  - Administer analgesics based on type and severity of pain and evaluate response  - Implement non-pharmacological measures as appropriate and evaluate response  - Consider cultural and social influences on pain and pain management  - Notify physician/advanced practitioner if interventions unsuccessful or patient reports new pain  Outcome: Progressing     Problem: INFECTION - ADULT  Goal: Absence or prevention of progression during hospitalization  Description: INTERVENTIONS:  - Assess and monitor for signs and symptoms of infection  - Monitor lab/diagnostic results  - Monitor all insertion sites, i.e. indwelling lines, tubes, and drains  - Monitor endotracheal if appropriate and nasal secretions for changes in amount and color  - [place] appropriate cooling/warming therapies per order  - Administer medications as ordered  - Instruct and encourage patient and family to use good hand hygiene technique  - Identify and instruct in appropriate isolation precautions for identified infection/condition  Outcome: Progressing     Problem: SAFETY ADULT  Goal: Patient will remain free of falls  Description: INTERVENTIONS:  - Educate patient/family on patient safety including physical limitations  - Instruct patient to call for assistance with activity   - Consult OT/PT to assist with strengthening/mobility   - Keep Call bell within reach  - Keep bed low and locked with side rails adjusted as appropriate  - Keep care items and personal belongings within reach  - Initiate and maintain comfort rounds  - Make Fall Risk Sign visible to staff  - Offer Toileting every 2 Hours, in advance of need  - Initiate/Maintain alarm  - Obtain necessary fall risk management equipment:   - Apply yellow socks and bracelet for high fall risk patients  - Consider moving patient to room near nurses station  Outcome: Progressing     Problem: DISCHARGE PLANNING  Goal: Discharge to home or other facility with appropriate resources  Description: INTERVENTIONS:  - Identify barriers to discharge w/patient and caregiver  - Arrange for needed discharge resources and transportation as appropriate  - Identify discharge learning needs (meds, wound care, etc.)  - Arrange for interpretive services to assist at discharge as needed  - Refer to Case Management Department for coordinating discharge planning if the patient needs post-hospital services based on physician/advanced practitioner order or complex needs related to functional status, cognitive ability, or social support system  Outcome: Progressing     Problem: Knowledge Deficit  Goal: Patient/family/caregiver demonstrates understanding of disease process, treatment plan, medications, and discharge instructions  Description: Complete learning assessment and assess knowledge base.   Interventions:  - Provide teaching at level of understanding  - Provide teaching via preferred learning methods  Outcome: Progressing     Problem: RESPIRATORY - ADULT  Goal: Achieves optimal ventilation and oxygenation  Description: INTERVENTIONS:  - Assess for changes in respiratory status  - Assess for changes in mentation and behavior  - Position to facilitate oxygenation and minimize respiratory effort  - Oxygen administered by appropriate delivery if ordered  - Initiate smoking cessation education as indicated  - Encourage broncho-pulmonary hygiene including cough, deep breathe, Incentive Spirometry  - Assess the need for suctioning and aspirate as needed  - Assess and instruct to report SOB or any respiratory difficulty  - Respiratory Therapy support as indicated  Outcome: Progressing

## 2023-10-21 NOTE — PLAN OF CARE
Problem: PHYSICAL THERAPY ADULT  Goal: Performs mobility at highest level of function for planned discharge setting. See evaluation for individualized goals. Description: Treatment/Interventions: ADL retraining, Functional transfer training, Bed mobility, Gait training, Spoke to nursing          See flowsheet documentation for full assessment, interventions and recommendations. Outcome: Completed  Note: Prognosis: Good  Problem List: Decreased endurance, Decreased mobility  Assessment: Pt seen for PT evaluation. Pt with active PT eval/treat orders. Pt is a 82 y.o. male who was admitted to [facility] on 10/20/23. Pt's current dx/ problem list include: multifocal pneumonia. Comorbidities affecting pt's physical performance at time of assessment are as follows: has a past medical history of Arthritis, BPH (benign prostatic hyperplasia), Cancer (HCC), Chronic lymphocytic leukemia (CLL), B-cell ([address]), CPAP (continuous positive airway pressure) dependence, Depression, Hearing aid worn, Hyperlipidemia, Hypertension, Sinusitis, Sleep apnea, Tinnitus, and Wears glasses. Personal factors affecting pt at time of initial examination include: steps to enter environment, limited home support, past experience, inability to perform IADLs, inability to perform ADLs. Due to acute medical issues, ongoing medical workup for primary dx; decreased activity tolerance compared to baseline, increased assistance needed from caregiver at current time, multiple lines, decline in overall functional mobility status; health management issues. At baseline, pt resides alone in a 1  with 1 ELIS and was independent with ADLs/ IADLs. Currently, upon initial examination, pt is demonstrating independence with all functional mobility and transfers. No further acute PT needs at this time. Patient would benefit from continued mobilization from restorative and nursing staff.  The patient's AM-PAC Basic Mobility Inpatient Short Form Raw Score is 23. A Raw score of greater than 16 suggests the patient may benefit from discharge to home. Please also refer to the recommendation of the Physical Therapist for safe discharge planning. Barriers to Discharge: None     PT Discharge Recommendation: No rehabilitation needs    See flowsheet documentation for full assessment.

## 2023-10-21 NOTE — ASSESSMENT & PLAN NOTE
Endorses remote history of A-fib reported to be due to adverse side effect of medication to treat CLL  Is not on anticoagulation  Confirmed as per chart review; follows with [facility] cardiology    Rate controlled at this time

## 2023-10-22 VITALS
HEART RATE: 80 BPM | RESPIRATION RATE: 18 BRPM | SYSTOLIC BLOOD PRESSURE: 134 MMHG | OXYGEN SATURATION: 94 % | DIASTOLIC BLOOD PRESSURE: 63 MMHG | HEIGHT: 68 IN | WEIGHT: 162.26 LBS | BODY MASS INDEX: 24.59 KG/M2 | TEMPERATURE: 98.4 F

## 2023-10-22 LAB
ANION GAP SERPL CALCULATED.3IONS-SCNC: 8 MMOL/L
BASOPHILS # BLD AUTO: 0.03 THOUSANDS/ÂΜL (ref 0–0.1)
BASOPHILS NFR BLD AUTO: 0 % (ref 0–1)
BUN SERPL-MCNC: 13 MG/DL (ref 5–25)
CALCIUM SERPL-MCNC: 8.8 MG/DL (ref 8.4–10.2)
CHLORIDE SERPL-SCNC: 98 MMOL/L (ref 96–108)
CO2 SERPL-SCNC: 28 MMOL/L (ref 21–32)
CREAT SERPL-MCNC: 0.85 MG/DL (ref 0.6–1.3)
EOSINOPHIL # BLD AUTO: 0.07 THOUSAND/ÂΜL (ref 0–0.61)
EOSINOPHIL NFR BLD AUTO: 1 % (ref 0–6)
ERYTHROCYTE [DISTWIDTH] IN BLOOD BY AUTOMATED COUNT: 13.6 % (ref 11.6–15.1)
GFR SERPL CREATININE-BSD FRML MDRD: 81 ML/MIN/1.73SQ M
GLUCOSE SERPL-MCNC: 120 MG/DL (ref 65–140)
HCT VFR BLD AUTO: 34.7 % (ref 36.5–49.3)
HGB BLD-MCNC: 11.3 G/DL (ref 12–17)
IMM GRANULOCYTES # BLD AUTO: 0.06 THOUSAND/UL (ref 0–0.2)
IMM GRANULOCYTES NFR BLD AUTO: 1 % (ref 0–2)
LYMPHOCYTES # BLD AUTO: 1.12 THOUSANDS/ÂΜL (ref 0.6–4.47)
LYMPHOCYTES NFR BLD AUTO: 9 % (ref 14–44)
MCH RBC QN AUTO: 29.7 PG (ref 26.8–34.3)
MCHC RBC AUTO-ENTMCNC: 32.6 G/DL (ref 31.4–37.4)
MCV RBC AUTO: 91 FL (ref 82–98)
MONOCYTES # BLD AUTO: 1.8 THOUSAND/ÂΜL (ref 0.17–1.22)
MONOCYTES NFR BLD AUTO: 14 % (ref 4–12)
MRSA NOSE QL CULT: NORMAL
NEUTROPHILS # BLD AUTO: 9.64 THOUSANDS/ÂΜL (ref 1.85–7.62)
NEUTS SEG NFR BLD AUTO: 75 % (ref 43–75)
NRBC BLD AUTO-RTO: 0 /100 WBCS
PLATELET # BLD AUTO: 192 THOUSANDS/UL (ref 149–390)
PMV BLD AUTO: 10.8 FL (ref 8.9–12.7)
POTASSIUM SERPL-SCNC: 4.1 MMOL/L (ref 3.5–5.3)
RBC # BLD AUTO: 3.8 MILLION/UL (ref 3.88–5.62)
SODIUM SERPL-SCNC: 134 MMOL/L (ref 135–147)
WBC # BLD AUTO: 12.72 THOUSAND/UL (ref 4.31–10.16)

## 2023-10-22 PROCEDURE — 80048 BASIC METABOLIC PNL TOTAL CA: CPT | Performed by: STUDENT IN AN ORGANIZED HEALTH CARE EDUCATION/TRAINING PROGRAM

## 2023-10-22 PROCEDURE — 94664 DEMO&/EVAL PT USE INHALER: CPT

## 2023-10-22 PROCEDURE — 85025 COMPLETE CBC W/AUTO DIFF WBC: CPT | Performed by: STUDENT IN AN ORGANIZED HEALTH CARE EDUCATION/TRAINING PROGRAM

## 2023-10-22 PROCEDURE — 99238 HOSP IP/OBS DSCHRG MGMT 30/<: CPT | Performed by: INTERNAL MEDICINE

## 2023-10-22 RX ORDER — AMOXICILLIN AND CLAVULANATE POTASSIUM 875; 125 MG/1; MG/1
1 TABLET, FILM COATED ORAL EVERY 12 HOURS SCHEDULED
Qty: 12 TABLET | Refills: 0 | Status: SHIPPED | OUTPATIENT
Start: 2023-10-22 | End: 2023-10-28

## 2023-10-22 RX ORDER — ALBUTEROL SULFATE 90 UG/1
2 AEROSOL, METERED RESPIRATORY (INHALATION) EVERY 4 HOURS PRN
Status: DISCONTINUED | OUTPATIENT
Start: 2023-10-22 | End: 2023-10-22 | Stop reason: HOSPADM

## 2023-10-22 RX ORDER — GUAIFENESIN 600 MG/1
600 TABLET, EXTENDED RELEASE ORAL EVERY 12 HOURS SCHEDULED
Qty: 10 TABLET | Refills: 0 | Status: SHIPPED | OUTPATIENT
Start: 2023-10-22 | End: 2023-10-27

## 2023-10-22 RX ORDER — AZITHROMYCIN 500 MG/1
500 TABLET, FILM COATED ORAL EVERY 24 HOURS
Qty: 6 TABLET | Refills: 0 | Status: SHIPPED | OUTPATIENT
Start: 2023-10-22 | End: 2023-10-28

## 2023-10-22 RX ADMIN — BUPROPION HYDROCHLORIDE 150 MG: 150 TABLET, FILM COATED, EXTENDED RELEASE ORAL at 09:00

## 2023-10-22 RX ADMIN — SODIUM CHLORIDE 3 G: 9 INJECTION, SOLUTION INTRAVENOUS at 06:05

## 2023-10-22 RX ADMIN — SODIUM CHLORIDE 3 G: 9 INJECTION, SOLUTION INTRAVENOUS at 11:41

## 2023-10-22 RX ADMIN — GUAIFENESIN 600 MG: 600 TABLET, EXTENDED RELEASE ORAL at 09:00

## 2023-10-22 RX ADMIN — TAMSULOSIN HYDROCHLORIDE 0.4 MG: 0.4 CAPSULE ORAL at 10:53

## 2023-10-22 NOTE — DISCHARGE INSTR - AVS FIRST PAGE
You were in the hospital due to an infection in your lungs called pneumonia. We started you on antibiotics through your IV while inpatient, and now we have prescribed you antibiotics to finish taking for a total of 7 days therapy. You will take:   -Augmentin 875-125mg (1 tablet) twice a day for 6 days.   -Azithromycin 500mg (1 tablet) once a day for 6 days. We also have prescribed you Mucinex to be taken twice a day for 5 days. This will help you cough up any mucus. Follow-up with your PCP within 1 week of discharge to see how you infection is improving.

## 2023-10-22 NOTE — PLAN OF CARE
Problem: PAIN - ADULT  Goal: Verbalizes/displays adequate comfort level or baseline comfort level  Description: Interventions:  - Encourage patient to monitor pain and request assistance  - Assess pain using appropriate pain scale  - Administer analgesics based on type and severity of pain and evaluate response  - Implement non-pharmacological measures as appropriate and evaluate response  - Consider cultural and social influences on pain and pain management  - Notify physician/advanced practitioner if interventions unsuccessful or patient reports new pain  Outcome: Progressing     Problem: INFECTION - ADULT  Goal: Absence or prevention of progression during hospitalization  Description: INTERVENTIONS:  - Assess and monitor for signs and symptoms of infection  - Monitor lab/diagnostic results  - Monitor all insertion sites, i.e. indwelling lines, tubes, and drains  - Monitor endotracheal if appropriate and nasal secretions for changes in amount and color  - [place] appropriate cooling/warming therapies per order  - Administer medications as ordered  - Instruct and encourage patient and family to use good hand hygiene technique  - Identify and instruct in appropriate isolation precautions for identified infection/condition  Outcome: Progressing     Problem: SAFETY ADULT  Goal: Patient will remain free of falls  Description: INTERVENTIONS:  - Educate patient/family on patient safety including physical limitations  - Instruct patient to call for assistance with activity   - Consult OT/PT to assist with strengthening/mobility   - Keep Call bell within reach  - Keep bed low and locked with side rails adjusted as appropriate  - Keep care items and personal belongings within reach  - Initiate and maintain comfort rounds  - Make Fall Risk Sign visible to staff  - Offer Toileting every 2 Hours, in advance of need  - Initiate/Maintain alarm  - Obtain necessary fall risk management equipment:   - Apply yellow socks and bracelet for high fall risk patients  - Consider moving patient to room near nurses station  Outcome: Progressing     Problem: DISCHARGE PLANNING  Goal: Discharge to home or other facility with appropriate resources  Description: INTERVENTIONS:  - Identify barriers to discharge w/patient and caregiver  - Arrange for needed discharge resources and transportation as appropriate  - Identify discharge learning needs (meds, wound care, etc.)  - Arrange for interpretive services to assist at discharge as needed  - Refer to Case Management Department for coordinating discharge planning if the patient needs post-hospital services based on physician/advanced practitioner order or complex needs related to functional status, cognitive ability, or social support system  Outcome: Progressing     Problem: Knowledge Deficit  Goal: Patient/family/caregiver demonstrates understanding of disease process, treatment plan, medications, and discharge instructions  Description: Complete learning assessment and assess knowledge base.   Interventions:  - Provide teaching at level of understanding  - Provide teaching via preferred learning methods  Outcome: Progressing     Problem: RESPIRATORY - ADULT  Goal: Achieves optimal ventilation and oxygenation  Description: INTERVENTIONS:  - Assess for changes in respiratory status  - Assess for changes in mentation and behavior  - Position to facilitate oxygenation and minimize respiratory effort  - Oxygen administered by appropriate delivery if ordered  - Initiate smoking cessation education as indicated  - Encourage broncho-pulmonary hygiene including cough, deep breathe, Incentive Spirometry  - Assess the need for suctioning and aspirate as needed  - Assess and instruct to report SOB or any respiratory difficulty  - Respiratory Therapy support as indicated  Outcome: Progressing

## 2023-10-22 NOTE — PLAN OF CARE
Patient is alert/oriented, afebrile, with moderate to strong non-productive cough, and denies pain/SOB on room air.         Problem: PAIN - ADULT  Goal: Verbalizes/displays adequate comfort level or baseline comfort level  Description: Interventions:  - Encourage patient to monitor pain and request assistance  - Assess pain using appropriate pain scale  - Administer analgesics based on type and severity of pain and evaluate response  - Implement non-pharmacological measures as appropriate and evaluate response  - Consider cultural and social influences on pain and pain management  - Notify physician/advanced practitioner if interventions unsuccessful or patient reports new pain  Outcome: Progressing     Problem: SAFETY ADULT  Goal: Patient will remain free of falls  Description: INTERVENTIONS:  - Educate patient/family on patient safety including physical limitations  - Instruct patient to call for assistance with activity   - Consult OT/PT to assist with strengthening/mobility   - Keep Call bell within reach  - Keep bed low and locked with side rails adjusted as appropriate  - Keep care items and personal belongings within reach  - Initiate and maintain comfort rounds  - Make Fall Risk Sign visible to staff  - Apply yellow socks and bracelet for high fall risk patients  - Consider moving patient to room near nurses station  Outcome: Progressing     Problem: RESPIRATORY - ADULT  Goal: Achieves optimal ventilation and oxygenation  Description: INTERVENTIONS:  - Assess for changes in respiratory status  - Assess for changes in mentation and behavior  - Position to facilitate oxygenation and minimize respiratory effort  - Oxygen administered by appropriate delivery if ordered  - Initiate smoking cessation education as indicated  - Encourage broncho-pulmonary hygiene including cough, deep breathe, Incentive Spirometry  - Assess the need for suctioning and aspirate as needed  - Assess and instruct to report SOB or any respiratory difficulty  - Respiratory Therapy support as indicated  Outcome: Progressing

## 2023-10-22 NOTE — PROGRESS NOTES
Patient's IV catheters removed, discharge instruction was given. Patient is waiting for his his family to be picked up.

## 2023-10-22 NOTE — CASE MANAGEMENT
Case Management Assessment & Discharge Planning Note    Patient name Keith Barnett  Location XL6 [id number] MRN [id number]  : 1940 Date 10/22/2023       Current Admission Date: 10/20/2023  Current Admission Diagnosis:Multifocal pneumonia   Patient Active Problem List    Diagnosis Date Noted    Multifocal pneumonia 10/21/2023    Dyspnea on exertion 10/21/2023    Aortic ectasia ([address]) 10/21/2023    History of colon polyps 2023    CPAP (continuous positive airway pressure) dependence 2022    Other gastritis with bleeding 2022    Gastric polyp 2022    Hematemesis 2022    History of atrial fibrillation 2021    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 [address]  Phone: [phone] Fax: [phone]    Primary Care Provider: Felicita Baca MD    Primary Insurance: MEDICARE  Secondary Insurance: AARP    ASSESSMENT:  Brownrt Proxies    There are no active Health Care Proxies on file. DISCHARGE DETAILS:    Discharge planning discussed with[de-identified] Chart reviewed. Pt is medically cleared for discharged. Pt to discharged home with no need and spouse to transport upon discharged. Cm spoke to pt's spouse updated on medical clearance and spouse to transport.

## 2023-10-22 NOTE — OCCUPATIONAL THERAPY NOTE
OT SCREEN    OT orders received. Chart reviewed. Pt found standing in room independently; reports being I at this time w/ all ADL tasks and functional mobility. Has no immediate acute skilled OT needs and reports no concerns regarding D/C home when medically stable. Will D/C OT orders.        10/22/23 1131   OT Last Visit   OT Visit Date 10/22/23   Note Type   Note type Screen;Evaluation     Ramesh Waters MS, OTR/L

## 2023-10-22 NOTE — DISCHARGE SUMMARY
INTERNAL MEDICINE RESIDENCY DISCHARGE SUMMARY     Germán Warner   82 y.o. 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 Follow-up low radiation dose chest CT in one year     Dyspnea on exertion  Assessment & Plan  Patient endorsing 2 weeks of acute on chronic dyspnea   Denies chest pain. No dyspnea at rest.  Has been evaluated for same as an outpatient. Has known hx of CLL, on acalabrutiinib, follows with  heme-onc  Follows with  pulmonology for mild persistent asthma, controlled on inhaler  Follows with [facility] cardiology. Per chart review, last TTE 5/0122 normal systolic function, no wall motion abnormalities or major valvular dysfunction. Last treadmill stress test 2/2023 negative for ischemia. Compliant on statin, home antihypertensives. Hgb low 11.9 on admission, baseline ~13. No s/s of bleed. CT chest (10/19) showing coronary calcifications. Suspect multifactorial etiology including age-related deconditioning, CLL, likely worsened by infectious etiologies note as above.     Plan:  Refer to A&P for multifocal PNA  Trend CBC, consider further anemia workup if Hgb continues downward trend  Pt/Ot while in patient  Close PCP follow up advised  Please refer to our plan under multifocal pneumonia    History of atrial fibrillation  Assessment & Plan  Endorses remote history of A-fib reported to be due to adverse side effect of medication to treat CLL  Is not on anticoagulation  Confirmed as per chart review; follows with [facility] cardiology    Rate controlled at this time    IgG deficiency [facility])  Assessment & Plan  Receives monthly IVIG as an outpatient    Hypertension  Assessment & Plan  Controlled on losartan 25 mg daily    Plan:  Continue home losartan 25 mg qd    Hyperlipidemia  Assessment & Plan  On Crestor 20 mg daily at home    Plan:  Continue hospital formulary atorvastatin 40 mg qd    CLL (chronic lymphocytic leukemia) ([address])  Assessment & Plan  Known history of CLL, chronically managed on acalabrutinib 100 mg twice a day and he has been tolerating that well without problem. Gets acalabrutinib from [facility]. Sees Dr. Brandy Salcido at [facility]. Also follows with [facility] hematology        Mild intermittent asthma without complication  Assessment & Plan  Controled on fluticosone inhaler, prn albuterol rescue inhaler  Follows with  Pulmonary  Not in acute exacerbation clinically    Plan:  Continue home fluticasone inhaler  Pulmonary hygiene      * Multifocal pneumonia  Assessment & Plan  Patient with history of CLL, mild persistent asthma, STEFANY, HTN who reports 2 weeks of worsening nonproductive cough, acute on chronic dyspnea, and generalized feelings of being unwell. No fever/chills, nausea/vomiting, diarrhea. No chest pain or palpitations. Lives with wife at home. No recent hospitalizations or antibiotics use. Closely follows with pulmonology and cardiology as an outpatient. VSS, HDS on arrival.  Ill-appearing but in no acute distress. Lungs with decreased airway entry diffusely, heart RRR. Workup in ED significant for leukocytosis 13.6, Hgb 11.9, ; sodium 130, at baseline; chemistries otherwise normal. Pro-Calc/lactate negative. CT chest (10/20)-ordered by PCP, showing bilateral multifocal pneumonia and small pleural effusions   Blood cultures x2, MRSA culture obtained. Given dose of IV Unasyn 3 g once, azithromycin 500 mg once.   Patient allergic to ceftriaxone (liver toxicity)    WBC downtrending, afebrile  Saturating well on RA, ambulatory      Plan:  IV Unasyn 3 g q6h, Azithromycin 500 mg daily day 2  Likely switch to oral Augmentin/Azithro  Based on our discussion with infectious disease, we will switch the patient to oral Augmentin and azithromycin to complete a total antibiotic course of 7 days. We had reached out to infectious disease due to this patient's history of immunocompromise and therefore we wanted to know the duration and regimen of antibiotics to prescribed upon discharge. Blood cultures with no growth at 24 hours  Based on patient's request and our plan with the patient, will plan for discharge later today  Mucinex for cough  Respiratory protocol  Pulmonary hygiene including use of incentive spirometry, bed to chair at regular intervals  PT/OT        [address] [facility]     As per HPI by Dr. Deborah Mcneal, "Germán Warner is a 82y.o. 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 States he has been having cough and dyspnea for the past 2 weeks. PCP subsequently ordered CT imaging that showed findings consistent with multifocal pneumonia, which patient was advised ED evaluation for antibiotics and admission. Denies fevers or chills. Feels fatigued. Denies diamond hemoptysis. No chest pain or palpitations. No nausea/vomiting/diarrhea. Lives alone with wife. No known sick contacts. No other history of cardiopulmonary disease with exception of asthma. No history of PE. Recent surgeries, hospitalizations, history of MRSA/MDRO infections. Follows with [facility] cardiology.   Per chart review, last TTE 2/2299 normal systolic function, no wall motion abnormalities or major valvular dysfunction. Last treadmill stress test 2/2023 negative for ischemia. Lisinopril recently switched to losartan by cardiology. Patient is allergic to ceftriaxone (liver toxicity). ED course: VSS, HDS, appears chronically ill exam otherwise unremarkable. Work-up significant for leukocytosis 13.6, hemoglobin 11.9, ; sodium 130, at baseline; chemistries otherwise normal. Pro-Calc/lactate negative. COVID/RSV/Flu negative. Outpatient CT reviewed, showing bilateral multifocal pneumonia and small pleural effusions. Blood cultures x2, MRSA culture obtained. Given dose of IV Unasyn 3 g once, azithromycin 500 mg once. Will admit to [facility] for further evaluation management."    Patient was subsequently hospitalized and started on treatment with Unasyn and azithromycin. At the time ceftriaxone was not started because of his history of allergy to ceftriaxone. He underwent evaluation with respiratory therapy and treatment with inhalers and was noted to have improvement in his symptoms. Was also given brief course of Mucinex. Other work-up including both blood cultures with no growth at 24 hours and flu and COVID panel that was negative. After further discussion with patient and infectious disease team, patient was placed on a total 7-day course of antibiotics with plan to switch to oral antibiotic upon discharge. Based on recommendations from infectious disease, patient was switched to azithromycin and Augmentin. Today, patient is stable for discharge and agreeable to discharge planning. In the future, patient is strongly urged to follow-up with his PCP. Patient was put on this antibiotic regimen based on recommendations from infectious disease and due to his history of immunocompromise. Physical Exam  Vitals reviewed. HENT:      Head: Normocephalic.    Cardiovascular:      Rate and Rhythm: Normal rate and regular rhythm. Pulses: Normal pulses. Heart sounds: Normal heart sounds. Pulmonary:      Effort: Pulmonary effort is normal.      Comments: Rales on left side  Abdominal:      General: Bowel sounds are normal.      Palpations: Abdomen is soft. Skin:     Capillary Refill: Capillary refill takes less than 2 seconds. Neurological:      Mental Status: He is alert and oriented to person, place, and time. DISCHARGE INFORMATION     PCP at Discharge: Matthias Salinas MD    Admitting Provider: Phil Holder DO  Admission Date: 10/20/2023    Discharge Provider: Phil Holder DO  Discharge Date: 10/22/2023    Discharge Disposition: Home/Self Care  Discharge Condition: stable  Discharge with Lines: no    Discharge Diet: regular diet  Activity Restrictions:  No limitations or restrictions. Test Results Pending at Discharge: None    Discharge Diagnoses:  Principal Problem:    Multifocal pneumonia  Active Problems:    Mild intermittent asthma without complication    CLL (chronic lymphocytic leukemia) (HCC)    Hyperlipidemia    Hypertension    IgG deficiency (HCC)    History of atrial fibrillation    Dyspnea on exertion    Aortic ectasia (HCC)  Resolved Problems:    * No resolved hospital problems. *      Consulting Providers:    Diagnostic & Therapeutic Procedures Performed:  CT chest wo contrast    Result Date: 10/20/2023  Impression: 1. Bilateral multifocal pneumonia and small pleural effusions. Follow-up to complete resolution recommended. 2. Ectasia of the ascending thoracic aorta. Follow-up evaluation is recommended in 1 year interval. The study was marked in [facility] for immediate notification.  . Workstation performed: [id number]       Code Status: Level 1 - Full Code  Advance Directive & Living Will: <no information>  Power of :    POLST:      Medications:  Current Discharge Medication List        Current Discharge Medication List        Current Discharge Medication List        CONTINUE these medications which have NOT CHANGED    Details   Acalabrutinib 100 MG CAPS Take 1 capsule by mouth 2 (two) times a day      albuterol (ProAir HFA) 90 mcg/act inhaler Inhale 2 puffs every 4 (four) hours as needed for wheezing  Qty: 8.5 g, Refills: 6    Comments: Substitution to a formulary equivalent within the same pharmaceutical class is authorized. Associated Diagnoses: Mild persistent asthma without complication      Arnuity Ellipta 100 MCG/ACT AEPB inhaler Inhale 1 puff daily Rinse mouth after use. Qty: 1 blister, Refills: 6    Associated Diagnoses: Mild persistent asthma without complication      buPROPion (WELLBUTRIN SR) 150 mg 12 hr tablet       Calquence 100 MG TABS       fluticasone (Flovent HFA) 220 mcg/act inhaler Inhale 1 puff once daily Rinse mouth after use. Qty: 12 g, Refills: 3    Associated Diagnoses: Mild intermittent asthma without complication      immune globulin, human (Bivigam) 5 GM/50ML 15mg IV every 8 weeks      Immune Globulin, Human, (GAMUNEX-C IJ)       losartan (COZAAR) 25 mg tablet Take 25 mg by mouth daily at bedtime      Multiple Vitamins-Minerals (PRESERVISION AREDS 2 PO) Take by mouth 2 (two) times a day      rosuvastatin (CRESTOR) 20 MG tablet       tadalafil (CIALIS) 5 MG tablet daily at bedtime      tamsulosin (FLOMAX) 0.4 mg Take 0.4 mg by mouth 2 (two) times a day             Allergies: Allergies   Allergen Reactions    Rocephin [Ceftriaxone]      Avoids d/t previous liver toxicity       FOLLOW-UP     PCP Outpatient Follow-up:  Yes, PCP followup    Consulting Providers Follow-up:  PCP     Active Issues Requiring Follow-up:   None    Discharge Statement:   I spent 45 minutes discharging the patient. This time was spent on the day of discharge. I had direct contact with the patient on the day of discharge. Additional documentation is required if more than 30 minutes were spent on discharge. Portions of the record may have been created with voice recognition software. Occasional wrong word or "sound a like" substitutions may have occurred due to the inherent limitations of voice recognition software. Read the chart carefully and recognize, using context, where substitutions have occurred.       Zac La MD  Internal Medicine Residency PGY-3  [address] [facility]

## 2023-10-22 NOTE — PLAN OF CARE
Problem: PAIN - ADULT  Goal: Verbalizes/displays adequate comfort level or baseline comfort level  Description: Interventions:  - Encourage patient to monitor pain and request assistance  - Assess pain using appropriate pain scale  - Administer analgesics based on type and severity of pain and evaluate response  - Implement non-pharmacological measures as appropriate and evaluate response  - Consider cultural and social influences on pain and pain management  - Notify physician/advanced practitioner if interventions unsuccessful or patient reports new pain  10/22/2023 1336 by Radha Ackerman RN  Outcome: Adequate for Discharge  10/22/2023 0823 by Radha Ackerman RN  Outcome: Progressing     Problem: INFECTION - ADULT  Goal: Absence or prevention of progression during hospitalization  Description: INTERVENTIONS:  - Assess and monitor for signs and symptoms of infection  - Monitor lab/diagnostic results  - Monitor all insertion sites, i.e. indwelling lines, tubes, and drains  - Monitor endotracheal if appropriate and nasal secretions for changes in amount and color  - [place] appropriate cooling/warming therapies per order  - Administer medications as ordered  - Instruct and encourage patient and family to use good hand hygiene technique  - Identify and instruct in appropriate isolation precautions for identified infection/condition  10/22/2023 1336 by Radha Ackerman RN  Outcome: Adequate for Discharge  10/22/2023 0823 by Radha Ackerman RN  Outcome: Progressing     Problem: SAFETY ADULT  Goal: Patient will remain free of falls  Description: INTERVENTIONS:  - Educate patient/family on patient safety including physical limitations  - Instruct patient to call for assistance with activity   - Consult OT/PT to assist with strengthening/mobility   - Keep Call bell within reach  - Keep bed low and locked with side rails adjusted as appropriate  - Keep care items and personal belongings within reach  - Initiate and maintain comfort rounds  - Make Fall Risk Sign visible to staff  - Offer Toileting every 2 Hours, in advance of need  - Initiate/Maintain alarm  - Obtain necessary fall risk management equipment:   - Apply yellow socks and bracelet for high fall risk patients  - Consider moving patient to room near nurses station  10/22/2023 1336 by Michael Duffy RN  Outcome: Adequate for Discharge  10/22/2023 0823 by Michael Duffy RN  Outcome: Progressing     Problem: DISCHARGE PLANNING  Goal: Discharge to home or other facility with appropriate resources  Description: INTERVENTIONS:  - Identify barriers to discharge w/patient and caregiver  - Arrange for needed discharge resources and transportation as appropriate  - Identify discharge learning needs (meds, wound care, etc.)  - Arrange for interpretive services to assist at discharge as needed  - Refer to Case Management Department for coordinating discharge planning if the patient needs post-hospital services based on physician/advanced practitioner order or complex needs related to functional status, cognitive ability, or social support system  10/22/2023 1336 by Michael Duffy RN  Outcome: Adequate for Discharge  10/22/2023 0823 by Michael Duffy RN  Outcome: Progressing     Problem: Knowledge Deficit  Goal: Patient/family/caregiver demonstrates understanding of disease process, treatment plan, medications, and discharge instructions  Description: Complete learning assessment and assess knowledge base.   Interventions:  - Provide teaching at level of understanding  - Provide teaching via preferred learning methods  10/22/2023 1336 by Michael Duffy RN  Outcome: Adequate for Discharge  10/22/2023 0823 by Michael Duffy RN  Outcome: Progressing     Problem: RESPIRATORY - ADULT  Goal: Achieves optimal ventilation and oxygenation  Description: INTERVENTIONS:  - Assess for changes in respiratory status  - Assess for changes in mentation and behavior  - Position to facilitate oxygenation and minimize respiratory effort  - Oxygen administered by appropriate delivery if ordered  - Initiate smoking cessation education as indicated  - Encourage broncho-pulmonary hygiene including cough, deep breathe, Incentive Spirometry  - Assess the need for suctioning and aspirate as needed  - Assess and instruct to report SOB or any respiratory difficulty  - Respiratory Therapy support as indicated  10/22/2023 1336 by Alex Bunn RN  Outcome: Adequate for Discharge  10/22/2023 0823 by Alex Bunn RN  Outcome: Progressing

## 2023-10-26 LAB
BACTERIA BLD CULT: NORMAL
BACTERIA BLD CULT: NORMAL

## 2023-10-30 ENCOUNTER — APPOINTMENT (OUTPATIENT)
Dept: LAB | Facility: CLINIC | Age: 83
End: 2023-10-30
Payer: MEDICARE

## 2023-10-30 DIAGNOSIS — N40.1 BENIGN PROSTATIC HYPERPLASIA WITH LOWER URINARY TRACT SYMPTOMS, SYMPTOM DETAILS UNSPECIFIED: ICD-10-CM

## 2023-10-30 DIAGNOSIS — D80.3 SELECTIVE DEFICIENCY OF IGG (HCC): ICD-10-CM

## 2023-10-30 DIAGNOSIS — I10 ESSENTIAL HYPERTENSION, MALIGNANT: ICD-10-CM

## 2023-10-30 DIAGNOSIS — J15.9 PNEUMONIA, BACTERIAL: ICD-10-CM

## 2023-10-30 DIAGNOSIS — J45.40 MODERATE PERSISTENT ASTHMA WITHOUT COMPLICATION: ICD-10-CM

## 2023-10-30 DIAGNOSIS — J18.9 PNEUMONIA, UNSPECIFIED ORGANISM: ICD-10-CM

## 2023-10-30 DIAGNOSIS — I77.810 DILATATION OF THORACIC AORTA (HCC): ICD-10-CM

## 2023-10-30 LAB
ALBUMIN SERPL BCP-MCNC: 4.2 G/DL (ref 3.5–5)
ALP SERPL-CCNC: 71 U/L (ref 34–104)
ALT SERPL W P-5'-P-CCNC: 33 U/L (ref 7–52)
ANION GAP SERPL CALCULATED.3IONS-SCNC: 9 MMOL/L
AST SERPL W P-5'-P-CCNC: 32 U/L (ref 13–39)
BASOPHILS # BLD MANUAL: 0.12 THOUSAND/UL (ref 0–0.1)
BASOPHILS NFR MAR MANUAL: 1 % (ref 0–1)
BILIRUB SERPL-MCNC: 0.69 MG/DL (ref 0.2–1)
BUN SERPL-MCNC: 12 MG/DL (ref 5–25)
CALCIUM SERPL-MCNC: 9.4 MG/DL (ref 8.4–10.2)
CHLORIDE SERPL-SCNC: 90 MMOL/L (ref 96–108)
CO2 SERPL-SCNC: 29 MMOL/L (ref 21–32)
CREAT SERPL-MCNC: 0.93 MG/DL (ref 0.6–1.3)
EOSINOPHIL # BLD MANUAL: 0.12 THOUSAND/UL (ref 0–0.4)
EOSINOPHIL NFR BLD MANUAL: 1 % (ref 0–6)
ERYTHROCYTE [DISTWIDTH] IN BLOOD BY AUTOMATED COUNT: 13 % (ref 11.6–15.1)
GFR SERPL CREATININE-BSD FRML MDRD: 76 ML/MIN/1.73SQ M
GLUCOSE P FAST SERPL-MCNC: 123 MG/DL (ref 65–99)
HCT VFR BLD AUTO: 39.8 % (ref 36.5–49.3)
HGB BLD-MCNC: 13.8 G/DL (ref 12–17)
IGG SERPL-MCNC: 405 MG/DL (ref 635–1741)
LYMPHOCYTES # BLD AUTO: 1.91 THOUSAND/UL (ref 0.6–4.47)
LYMPHOCYTES # BLD AUTO: 14 % (ref 14–44)
MCH RBC QN AUTO: 30.3 PG (ref 26.8–34.3)
MCHC RBC AUTO-ENTMCNC: 34.7 G/DL (ref 31.4–37.4)
MCV RBC AUTO: 88 FL (ref 82–98)
MONOCYTES # BLD AUTO: 0.6 THOUSAND/UL (ref 0–1.22)
MONOCYTES NFR BLD: 5 % (ref 4–12)
NEUTROPHILS # BLD MANUAL: 9.2 THOUSAND/UL (ref 1.85–7.62)
NEUTS SEG NFR BLD AUTO: 77 % (ref 43–75)
PLATELET # BLD AUTO: 267 THOUSANDS/UL (ref 149–390)
PLATELET BLD QL SMEAR: ADEQUATE
PMV BLD AUTO: 10.2 FL (ref 8.9–12.7)
POIKILOCYTOSIS BLD QL SMEAR: PRESENT
POTASSIUM SERPL-SCNC: 4.2 MMOL/L (ref 3.5–5.3)
PROT SERPL-MCNC: 6.7 G/DL (ref 6.4–8.4)
RBC # BLD AUTO: 4.55 MILLION/UL (ref 3.88–5.62)
RBC MORPH BLD: PRESENT
SODIUM SERPL-SCNC: 128 MMOL/L (ref 135–147)
TSH SERPL DL<=0.05 MIU/L-ACNC: 1.6 UIU/ML (ref 0.45–4.5)
VARIANT LYMPHS # BLD AUTO: 2 %
VIT B12 SERPL-MCNC: 382 PG/ML (ref 180–914)
WBC # BLD AUTO: 11.95 THOUSAND/UL (ref 4.31–10.16)

## 2023-10-30 PROCEDURE — 82607 VITAMIN B-12: CPT

## 2023-10-30 PROCEDURE — 84443 ASSAY THYROID STIM HORMONE: CPT

## 2023-11-06 DIAGNOSIS — C91.10 CLL (CHRONIC LYMPHOCYTIC LEUKEMIA) (HCC): Primary | ICD-10-CM

## 2023-11-06 DIAGNOSIS — D80.3 IGG DEFICIENCY (HCC): ICD-10-CM

## 2023-11-06 RX ORDER — SODIUM CHLORIDE 9 MG/ML
20 INJECTION, SOLUTION INTRAVENOUS ONCE
Status: CANCELLED | OUTPATIENT
Start: 2023-11-08

## 2023-11-06 RX ORDER — ACETAMINOPHEN 325 MG/1
650 TABLET ORAL ONCE
Status: CANCELLED | OUTPATIENT
Start: 2023-11-08

## 2023-11-06 RX ORDER — DIPHENHYDRAMINE HCL 25 MG
12.5 TABLET ORAL ONCE
Status: CANCELLED | OUTPATIENT
Start: 2023-11-08

## 2023-11-08 ENCOUNTER — HOSPITAL ENCOUNTER (OUTPATIENT)
Dept: INFUSION CENTER | Facility: CLINIC | Age: 83
Discharge: HOME | End: 2023-11-08
Payer: MEDICARE

## 2023-11-08 VITALS
SYSTOLIC BLOOD PRESSURE: 117 MMHG | BODY MASS INDEX: 24.63 KG/M2 | TEMPERATURE: 97.9 F | HEART RATE: 81 BPM | RESPIRATION RATE: 18 BRPM | DIASTOLIC BLOOD PRESSURE: 63 MMHG | WEIGHT: 162 LBS | OXYGEN SATURATION: 95 %

## 2023-11-08 DIAGNOSIS — D80.3 IGG DEFICIENCY (HCC): ICD-10-CM

## 2023-11-08 DIAGNOSIS — C91.10 CLL (CHRONIC LYMPHOCYTIC LEUKEMIA) (HCC): Primary | ICD-10-CM

## 2023-11-08 PROCEDURE — 96375 TX/PRO/DX INJ NEW DRUG ADDON: CPT

## 2023-11-08 PROCEDURE — 96365 THER/PROPH/DIAG IV INF INIT: CPT

## 2023-11-08 PROCEDURE — 96366 THER/PROPH/DIAG IV INF ADDON: CPT

## 2023-11-08 RX ORDER — DIPHENHYDRAMINE HCL 25 MG
12.5 TABLET ORAL ONCE
OUTPATIENT
Start: 2024-01-03

## 2023-11-08 RX ORDER — DIPHENHYDRAMINE HCL 25 MG
12.5 TABLET ORAL ONCE
Status: COMPLETED | OUTPATIENT
Start: 2023-11-08 | End: 2023-11-08

## 2023-11-08 RX ORDER — SODIUM CHLORIDE 9 MG/ML
20 INJECTION, SOLUTION INTRAVENOUS ONCE
Status: COMPLETED | OUTPATIENT
Start: 2023-11-08 | End: 2023-11-08

## 2023-11-08 RX ORDER — SODIUM CHLORIDE 9 MG/ML
20 INJECTION, SOLUTION INTRAVENOUS ONCE
OUTPATIENT
Start: 2024-01-03

## 2023-11-08 RX ORDER — ACETAMINOPHEN 325 MG/1
650 TABLET ORAL ONCE
Status: COMPLETED | OUTPATIENT
Start: 2023-11-08 | End: 2023-11-08

## 2023-11-08 RX ORDER — ACETAMINOPHEN 325 MG/1
650 TABLET ORAL ONCE
OUTPATIENT
Start: 2024-01-03

## 2023-11-08 RX ADMIN — Medication 27.5 G: at 08:26

## 2023-11-08 RX ADMIN — SODIUM CHLORIDE 20 ML/HR: 0.9 INJECTION, SOLUTION INTRAVENOUS at 07:49

## 2023-11-08 RX ADMIN — HYDROCORTISONE SODIUM SUCCINATE 100 MG: 100 INJECTION, POWDER, FOR SOLUTION INTRAMUSCULAR; INTRAVENOUS at 07:50

## 2023-11-08 RX ADMIN — DIPHENHYDRAMINE HYDROCHLORIDE 12.5 MG: 25 TABLET ORAL at 07:49

## 2023-11-08 RX ADMIN — ACETAMINOPHEN 650 MG: 325 TABLET ORAL at 07:49

## 2023-11-08 NOTE — PROGRESS NOTES
Patient tolerated IVIG today without issue. PIV removed intact, coban wrap in place. Next appointment confirmed, AVS printed and provided. Patient ambulatory upon DC.

## 2023-11-13 ENCOUNTER — APPOINTMENT (OUTPATIENT)
Dept: LAB | Facility: HOSPITAL | Age: 83
End: 2023-11-13
Attending: INTERNAL MEDICINE
Payer: MEDICARE

## 2023-11-13 DIAGNOSIS — E87.1 HYPOSMOLALITY SYNDROME: ICD-10-CM

## 2023-11-13 LAB
ANION GAP SERPL CALCULATED.3IONS-SCNC: 5 MMOL/L
BASOPHILS # BLD AUTO: 0.02 THOUSANDS/ÂΜL (ref 0–0.1)
BASOPHILS NFR BLD AUTO: 0 % (ref 0–1)
BUN SERPL-MCNC: 15 MG/DL (ref 5–25)
CALCIUM SERPL-MCNC: 9.4 MG/DL (ref 8.4–10.2)
CHLORIDE SERPL-SCNC: 97 MMOL/L (ref 96–108)
CO2 SERPL-SCNC: 31 MMOL/L (ref 21–32)
CREAT SERPL-MCNC: 0.98 MG/DL (ref 0.6–1.3)
EOSINOPHIL # BLD AUTO: 0.03 THOUSAND/ÂΜL (ref 0–0.61)
EOSINOPHIL NFR BLD AUTO: 0 % (ref 0–6)
ERYTHROCYTE [DISTWIDTH] IN BLOOD BY AUTOMATED COUNT: 13.7 % (ref 11.6–15.1)
GFR SERPL CREATININE-BSD FRML MDRD: 72 ML/MIN/1.73SQ M
GLUCOSE SERPL-MCNC: 206 MG/DL (ref 65–140)
HCT VFR BLD AUTO: 41.2 % (ref 36.5–49.3)
HGB BLD-MCNC: 13.5 G/DL (ref 12–17)
IMM GRANULOCYTES # BLD AUTO: 0.02 THOUSAND/UL (ref 0–0.2)
IMM GRANULOCYTES NFR BLD AUTO: 0 % (ref 0–2)
LYMPHOCYTES # BLD AUTO: 3.09 THOUSANDS/ÂΜL (ref 0.6–4.47)
LYMPHOCYTES NFR BLD AUTO: 34 % (ref 14–44)
MCH RBC QN AUTO: 29.7 PG (ref 26.8–34.3)
MCHC RBC AUTO-ENTMCNC: 32.8 G/DL (ref 31.4–37.4)
MCV RBC AUTO: 91 FL (ref 82–98)
MONOCYTES # BLD AUTO: 0.93 THOUSAND/ÂΜL (ref 0.17–1.22)
MONOCYTES NFR BLD AUTO: 10 % (ref 4–12)
NEUTROPHILS # BLD AUTO: 5.02 THOUSANDS/ÂΜL (ref 1.85–7.62)
NEUTS SEG NFR BLD AUTO: 56 % (ref 43–75)
NRBC BLD AUTO-RTO: 0 /100 WBCS
PLATELET # BLD AUTO: 189 THOUSANDS/UL (ref 149–390)
PMV BLD AUTO: 10.6 FL (ref 8.9–12.7)
POTASSIUM SERPL-SCNC: 4.4 MMOL/L (ref 3.5–5.3)
RBC # BLD AUTO: 4.54 MILLION/UL (ref 3.88–5.62)
SODIUM SERPL-SCNC: 133 MMOL/L (ref 135–147)
WBC # BLD AUTO: 9.11 THOUSAND/UL (ref 4.31–10.16)

## 2023-11-13 PROCEDURE — 80048 BASIC METABOLIC PNL TOTAL CA: CPT

## 2023-11-13 PROCEDURE — 36415 COLL VENOUS BLD VENIPUNCTURE: CPT

## 2023-11-13 PROCEDURE — 85025 COMPLETE CBC W/AUTO DIFF WBC: CPT

## 2023-12-20 PROBLEM — J18.9 MULTIFOCAL PNEUMONIA: Status: RESOLVED | Noted: 2023-10-21 | Resolved: 2023-12-20

## 2024-01-03 ENCOUNTER — HOSPITAL ENCOUNTER (OUTPATIENT)
Dept: INFUSION CENTER | Facility: CLINIC | Age: 84
Discharge: HOME | End: 2024-01-03
Payer: MEDICARE

## 2024-01-03 VITALS
OXYGEN SATURATION: 97 % | TEMPERATURE: 96.9 F | WEIGHT: 165 LBS | SYSTOLIC BLOOD PRESSURE: 119 MMHG | DIASTOLIC BLOOD PRESSURE: 53 MMHG | BODY MASS INDEX: 25.09 KG/M2 | HEART RATE: 91 BPM | RESPIRATION RATE: 18 BRPM

## 2024-01-03 DIAGNOSIS — C91.10 CLL (CHRONIC LYMPHOCYTIC LEUKEMIA) (HCC): Primary | ICD-10-CM

## 2024-01-03 DIAGNOSIS — D80.3 IGG DEFICIENCY (HCC): ICD-10-CM

## 2024-01-03 DIAGNOSIS — D80.3 IGG DEFICIENCY (HCC): Primary | ICD-10-CM

## 2024-01-03 DIAGNOSIS — C91.10 CLL (CHRONIC LYMPHOCYTIC LEUKEMIA) (HCC): ICD-10-CM

## 2024-01-03 RX ORDER — ACETAMINOPHEN 325 MG/1
650 TABLET ORAL ONCE
Status: COMPLETED | OUTPATIENT
Start: 2024-01-03 | End: 2024-01-03

## 2024-01-03 RX ORDER — DIPHENHYDRAMINE HCL 25 MG
12.5 TABLET ORAL ONCE
Status: COMPLETED | OUTPATIENT
Start: 2024-01-03 | End: 2024-01-03

## 2024-01-03 RX ORDER — SODIUM CHLORIDE 9 MG/ML
20 INJECTION, SOLUTION INTRAVENOUS ONCE
OUTPATIENT
Start: 2024-02-28

## 2024-01-03 RX ORDER — SODIUM CHLORIDE 9 MG/ML
20 INJECTION, SOLUTION INTRAVENOUS ONCE
Status: COMPLETED | OUTPATIENT
Start: 2024-01-03 | End: 2024-01-03

## 2024-01-03 RX ORDER — DIPHENHYDRAMINE HCL 25 MG
12.5 TABLET ORAL ONCE
OUTPATIENT
Start: 2024-02-28

## 2024-01-03 RX ORDER — ACETAMINOPHEN 325 MG/1
650 TABLET ORAL ONCE
OUTPATIENT
Start: 2024-02-28

## 2024-01-03 RX ADMIN — HYDROCORTISONE SODIUM SUCCINATE 100 MG: 100 INJECTION, POWDER, FOR SOLUTION INTRAMUSCULAR; INTRAVENOUS at 08:29

## 2024-01-03 RX ADMIN — Medication 27.5 G: at 09:15

## 2024-01-03 RX ADMIN — SODIUM CHLORIDE 20 ML/HR: 0.9 INJECTION, SOLUTION INTRAVENOUS at 08:29

## 2024-01-03 RX ADMIN — ACETAMINOPHEN 650 MG: 325 TABLET ORAL at 08:29

## 2024-01-03 RX ADMIN — DIPHENHYDRAMINE HYDROCHLORIDE 12.5 MG: 25 TABLET ORAL at 08:29

## 2024-01-03 NOTE — PROGRESS NOTES
Patient arrived for IVIG. Patient tolerated treatment well without complaints. Patient does not have any appointments and was informed to stop at the front to make next appointment.

## 2024-01-08 ENCOUNTER — TELEPHONE (OUTPATIENT)
Dept: PULMONOLOGY | Facility: MEDICAL CENTER | Age: 84
End: 2024-01-08

## 2024-01-15 ENCOUNTER — APPOINTMENT (OUTPATIENT)
Dept: LAB | Facility: CLINIC | Age: 84
End: 2024-01-15
Payer: MEDICARE

## 2024-01-15 ENCOUNTER — TRANSCRIBE ORDERS (OUTPATIENT)
Dept: LAB | Facility: CLINIC | Age: 84
End: 2024-01-15

## 2024-01-15 DIAGNOSIS — E11.00 TYPE II DIABETES MELLITUS WITH HYPEROSMOLARITY, UNCONTROLLED (HCC): ICD-10-CM

## 2024-01-15 DIAGNOSIS — E05.00 TOXIC DIFFUSE GOITER WITH PRETIBIAL MYXEDEMA: ICD-10-CM

## 2024-01-15 DIAGNOSIS — J13 PNEUMONIA DUE TO STREPTOCOCCUS PNEUMONIAE, UNSPECIFIED LATERALITY, UNSPECIFIED PART OF LUNG (HCC): ICD-10-CM

## 2024-01-15 DIAGNOSIS — I10 ESSENTIAL HYPERTENSION, MALIGNANT: ICD-10-CM

## 2024-01-15 DIAGNOSIS — E03.8 TOXIC DIFFUSE GOITER WITH PRETIBIAL MYXEDEMA: ICD-10-CM

## 2024-01-15 DIAGNOSIS — E87.1 HYPOSMOLALITY SYNDROME: ICD-10-CM

## 2024-01-15 DIAGNOSIS — E11.65 TYPE II DIABETES MELLITUS WITH HYPEROSMOLARITY, UNCONTROLLED (HCC): ICD-10-CM

## 2024-01-15 DIAGNOSIS — E11.65 TYPE II DIABETES MELLITUS WITH HYPEROSMOLARITY, UNCONTROLLED (HCC): Primary | ICD-10-CM

## 2024-01-15 DIAGNOSIS — E11.00 TYPE II DIABETES MELLITUS WITH HYPEROSMOLARITY, UNCONTROLLED (HCC): Primary | ICD-10-CM

## 2024-01-15 LAB
CHOLEST SERPL-MCNC: 138 MG/DL
EST. AVERAGE GLUCOSE BLD GHB EST-MCNC: 131 MG/DL
HBA1C MFR BLD: 6.2 %
HDLC SERPL-MCNC: 78 MG/DL
LDLC SERPL CALC-MCNC: 49 MG/DL (ref 0–100)
NONHDLC SERPL-MCNC: 60 MG/DL
TRIGL SERPL-MCNC: 55 MG/DL

## 2024-01-15 PROCEDURE — 36415 COLL VENOUS BLD VENIPUNCTURE: CPT

## 2024-01-15 PROCEDURE — 80061 LIPID PANEL: CPT

## 2024-01-15 PROCEDURE — 83036 HEMOGLOBIN GLYCOSYLATED A1C: CPT

## 2024-01-18 ENCOUNTER — HOSPITAL ENCOUNTER (OUTPATIENT)
Dept: RADIOLOGY | Facility: HOSPITAL | Age: 84
End: 2024-01-18
Payer: MEDICARE

## 2024-01-18 DIAGNOSIS — J13 PNEUMONIA DUE TO STREPTOCOCCUS PNEUMONIAE, UNSPECIFIED LATERALITY, UNSPECIFIED PART OF LUNG (HCC): ICD-10-CM

## 2024-01-18 PROCEDURE — 71046 X-RAY EXAM CHEST 2 VIEWS: CPT

## 2024-02-21 PROBLEM — R05.9 COUGH: Status: RESOLVED | Noted: 2019-05-12 | Resolved: 2024-02-21

## 2024-04-16 ENCOUNTER — TELEPHONE (OUTPATIENT)
Dept: HEMATOLOGY ONCOLOGY | Facility: CLINIC | Age: 84
End: 2024-04-16

## 2024-04-16 NOTE — TELEPHONE ENCOUNTER
Appointment Change  Cancel, Reschedule, Change to Virtual      Who are you speaking with? Patient   If it is not the patient, is the caller listed on the communication consent form? N/A   Which provider is the appointment scheduled with? Dr. Whittaker   When was the original appointment scheduled?    Please list date and time 4/19/24 10am   At which location is the appointment scheduled to take place? [facility]   Was the appointment rescheduled?     Was the appointment changed from an in person visit to a virtual visit?    If so, please list the details of the change. 5/30/24 340   What is the reason for the appointment change? Appt conflict       Was [facility] transport scheduled? N/A   Does [facility] transport need to be scheduled for the new visit (if applicable) N/A   Does the patient need an infusion appointment rescheduled? N/A   Does the patient have an upcoming infusion appointment scheduled? If so, when? No   Is the patient undergoing chemotherapy? N/A   For appointments cancelled with less than 24 hours:  Was the no-show policy reviewed? N/A

## 2024-05-08 DIAGNOSIS — C91.10 CLL (CHRONIC LYMPHOCYTIC LEUKEMIA) (HCC): Primary | ICD-10-CM

## 2024-05-08 DIAGNOSIS — D80.3 IGG DEFICIENCY (HCC): ICD-10-CM

## 2024-05-08 RX ORDER — ACETAMINOPHEN 325 MG/1
650 TABLET ORAL ONCE
Status: CANCELLED | OUTPATIENT
Start: 2024-05-10

## 2024-05-08 RX ORDER — DIPHENHYDRAMINE HCL 25 MG
12.5 TABLET ORAL ONCE
Status: CANCELLED | OUTPATIENT
Start: 2024-05-10

## 2024-05-08 RX ORDER — SODIUM CHLORIDE 9 MG/ML
20 INJECTION, SOLUTION INTRAVENOUS ONCE
Status: CANCELLED | OUTPATIENT
Start: 2024-05-10

## 2024-05-10 ENCOUNTER — HOSPITAL ENCOUNTER (OUTPATIENT)
Dept: INFUSION CENTER | Facility: CLINIC | Age: 84
End: 2024-05-10
Payer: MEDICARE

## 2024-05-10 VITALS
WEIGHT: 166 LBS | OXYGEN SATURATION: 96 % | HEART RATE: 79 BPM | SYSTOLIC BLOOD PRESSURE: 124 MMHG | BODY MASS INDEX: 25.24 KG/M2 | TEMPERATURE: 97.6 F | DIASTOLIC BLOOD PRESSURE: 61 MMHG

## 2024-05-10 DIAGNOSIS — D80.3 IGG DEFICIENCY (HCC): ICD-10-CM

## 2024-05-10 DIAGNOSIS — C91.10 CLL (CHRONIC LYMPHOCYTIC LEUKEMIA) (HCC): Primary | ICD-10-CM

## 2024-05-10 PROCEDURE — 96366 THER/PROPH/DIAG IV INF ADDON: CPT

## 2024-05-10 PROCEDURE — 96375 TX/PRO/DX INJ NEW DRUG ADDON: CPT

## 2024-05-10 PROCEDURE — 96365 THER/PROPH/DIAG IV INF INIT: CPT

## 2024-05-10 RX ORDER — ACETAMINOPHEN 325 MG/1
650 TABLET ORAL ONCE
OUTPATIENT
Start: 2024-07-05

## 2024-05-10 RX ORDER — SODIUM CHLORIDE 9 MG/ML
20 INJECTION, SOLUTION INTRAVENOUS ONCE
OUTPATIENT
Start: 2024-07-05

## 2024-05-10 RX ORDER — SODIUM CHLORIDE 9 MG/ML
20 INJECTION, SOLUTION INTRAVENOUS ONCE
Status: COMPLETED | OUTPATIENT
Start: 2024-05-10 | End: 2024-05-10

## 2024-05-10 RX ORDER — DIPHENHYDRAMINE HCL 25 MG
12.5 TABLET ORAL ONCE
OUTPATIENT
Start: 2024-07-05

## 2024-05-10 RX ORDER — ACETAMINOPHEN 325 MG/1
650 TABLET ORAL ONCE
Status: COMPLETED | OUTPATIENT
Start: 2024-05-10 | End: 2024-05-10

## 2024-05-10 RX ORDER — DIPHENHYDRAMINE HCL 25 MG
12.5 TABLET ORAL ONCE
Status: COMPLETED | OUTPATIENT
Start: 2024-05-10 | End: 2024-05-10

## 2024-05-10 RX ADMIN — SODIUM CHLORIDE 20 ML/HR: 0.9 INJECTION, SOLUTION INTRAVENOUS at 08:09

## 2024-05-10 RX ADMIN — HYDROCORTISONE SODIUM SUCCINATE 100 MG: 100 INJECTION, POWDER, FOR SOLUTION INTRAMUSCULAR; INTRAVENOUS at 08:01

## 2024-05-10 RX ADMIN — ACETAMINOPHEN 650 MG: 325 TABLET ORAL at 08:00

## 2024-05-10 RX ADMIN — Medication 27.5 G: at 08:47

## 2024-05-10 RX ADMIN — DIPHENHYDRAMINE HYDROCHLORIDE 12.5 MG: 25 TABLET ORAL at 08:00

## 2024-05-10 NOTE — PROGRESS NOTES
Patient presents today for IVIG. Patient offers no complaints. VSS. Peripheral IV inserted without incident.

## 2024-05-10 NOTE — PROGRESS NOTES
Patient tolerated treatment without incident. Peripheral IV removed. Next appointment confirmed for 7/9/2024 at 0730. AVS offered and decline.

## 2024-05-30 ENCOUNTER — OFFICE VISIT (OUTPATIENT)
Dept: HEMATOLOGY ONCOLOGY | Facility: CLINIC | Age: 84
End: 2024-05-30
Payer: MEDICARE

## 2024-05-30 VITALS
RESPIRATION RATE: 18 BRPM | WEIGHT: 166 LBS | BODY MASS INDEX: 25.16 KG/M2 | HEIGHT: 68 IN | TEMPERATURE: 98 F | SYSTOLIC BLOOD PRESSURE: 130 MMHG | OXYGEN SATURATION: 97 % | DIASTOLIC BLOOD PRESSURE: 80 MMHG | HEART RATE: 66 BPM

## 2024-05-30 DIAGNOSIS — G47.33 OBSTRUCTIVE SLEEP APNEA: ICD-10-CM

## 2024-05-30 DIAGNOSIS — D80.3 IGG DEFICIENCY (HCC): ICD-10-CM

## 2024-05-30 DIAGNOSIS — E78.5 HYPERLIPIDEMIA, UNSPECIFIED HYPERLIPIDEMIA TYPE: ICD-10-CM

## 2024-05-30 DIAGNOSIS — C91.10 CLL (CHRONIC LYMPHOCYTIC LEUKEMIA) (HCC): Primary | ICD-10-CM

## 2024-05-30 DIAGNOSIS — Z86.79 HISTORY OF ATRIAL FIBRILLATION: ICD-10-CM

## 2024-05-30 DIAGNOSIS — I10 PRIMARY HYPERTENSION: ICD-10-CM

## 2024-05-30 PROCEDURE — G2211 COMPLEX E/M VISIT ADD ON: HCPCS | Performed by: INTERNAL MEDICINE

## 2024-05-30 PROCEDURE — 99214 OFFICE O/P EST MOD 30 MIN: CPT | Performed by: INTERNAL MEDICINE

## 2024-05-30 NOTE — PATIENT INSTRUCTIONS
Patient has standing orders for blood work and IVIG therapy.  He gets acalabrutinib from [facility].  Follow-up in 6 months.

## 2024-05-30 NOTE — PROGRESS NOTES
HPI:  Patient is here with his wife.  Continuation of care for CLL with low IgG level.  Patient has been on  acalabrutinib 100 mg twice a day and he has been tolerating that well without problem.  He gets acalabrutinib from [facility].  He sees Dr. Ennis at [facility].  He has history of atrial fibrillation but he is not symptomatic from that.  He is not on blood thinner.  No headaches.  No bleeding.  No other side effects to acalabrutinib.  CLL remains in remission.  For low IgG  he has been getting IVIG therapy once every 2 months and IgG level is staying above 400-the target is 400 and above to lower risk of infections.  When he is in [place] he gets IVIG therapy over there.    He has history of mild asthma and sleep apnea and he uses CPAP at night and sleeps better with that.  Much improved exertional dyspnea.  Has some tiredness .    ROS:  05/30/24 Reviewed 13 systems: See symptoms in HPI  Presently no  other neurological, cardiac, pulmonary, GI and  symptoms other than listed in HPI.  Other symptoms are in HPI.  No symptoms like fever, chills,  bleeding, bone pains, skin rash, weight loss, night sweats, arthritic symptoms,   weakness, numbness, claudication and gait problem. No more frequent infections.  Not unusually sensitive to heat or cold. No swelling of the ankles. No swollen glands.  Patient is not anxious.     Physical Exam:  Alert and oriented and not in distress.  Vitals are above.  No icterus.  No oral thrush.  There is  no palpable neck mass.  Lung fields are clear to percussion and auscultation.  Heart rate is regular.  Systolic murmur.  Liver and spleen are not palpably enlarged.  No other palpable abdominal mass.  Soft and nontender abdomen.  No ascites.  There is no edema of the ankles.  No calf tenderness.  There is no peripheral palpable lymphadenopathy.  There is no focal neurological deficit, no skin rash,  no clubbing.   Patient is not anxious.  Performance status 1.    Historical  Information   Past Medical History:   Diagnosis Date   • Arthritis    • BPH (benign prostatic hyperplasia)    • Cancer (HCC)    • Chronic lymphocytic leukemia (CLL), B-cell (HCC)    • CPAP (continuous positive airway pressure) dependence    • Depression    • Hearing aid worn     bilateral   • Hyperlipidemia     controlled   • Hypertension     controlled   • Sinusitis    • Sleep apnea     CPAP   • Tinnitus    • Wears glasses      Past Surgical History:   Procedure Laterality Date   • BACK SURGERY  09/2017    laminotomy L3,4 S1-   • CARPAL TUNNEL RELEASE Bilateral    • CATARACT EXTRACTION      left   • CATARACT EXTRACTION W/ INTRAOCULAR LENS IMPLANT Left 12/11/2017    Procedure: EXTRACTION EXTRACAPSULAR CATARACT PHACO INTRAOCULAR LENS (IOL);  Surgeon: Blade Quintana MD;  Location: [facility] MAIN OR;  Service: Ophthalmology   • CERVICAL DISCECTOMY  1982    C5-6   • COLONOSCOPY     • HERNIA REPAIR      l inguinal   • IR LOWER EXTREMITY / INTERVENTION  12/2/2019   • ME EXCISION MALIGNANT LESION F/E/E/N/L 1.1-2.0 CM Right 1/11/2022    Procedure: EXCISION SKIN LESION CHEEK WITH FROZEN SECTION;  Surgeon: Yvon Pablo MD;  Location: [facility] MAIN OR;  Service: Plastics   • ME XCAPSL CTRC RMVL INSJ IO LENS PROSTH W/O ECP Right 11/14/2022    Procedure: EXTRACTION EXTRACAPSULAR CATARACT PHACO INTRAOCULAR LENS (IOL);  Surgeon: Blade Quintana MD;  Location: [facility] MAIN OR;  Service: Ophthalmology   • ROTATOR CUFF REPAIR Left 2006   • TONSILLECTOMY       Social History   Social History     Substance and Sexual Activity   Alcohol Use Yes   • Alcohol/week: 7.0 standard drinks of alcohol   • Types: 7 Glasses of wine per week    Comment: occ     Social History     Substance and Sexual Activity   Drug Use No     Social History     Tobacco Use   Smoking Status Former   • Current packs/day: 0.00   • Average packs/day: 1 pack/day for 20.0 years (20.0 ttl pk-yrs)   • Types: Cigarettes   • Start date: 1965   • Quit date: 1985   • Years since  quittin.4   Smokeless Tobacco Never     Family History:   Family History   Problem Relation Age of Onset   • Cancer Mother         bladder   • Heart disease Father    • Stroke Father    • Parkinsonism Brother          Current Outpatient Medications:   •  Acalabrutinib 100 MG CAPS, Take 1 capsule by mouth 2 (two) times a day, Disp: , Rfl:   •  albuterol (ProAir HFA) 90 mcg/act inhaler, Inhale 2 puffs every 4 (four) hours as needed for wheezing, Disp: 8.5 g, Rfl: 6  •  Arnuity Ellipta 100 MCG/ACT AEPB inhaler, Inhale 1 puff daily Rinse mouth after use., Disp: 1 blister, Rfl: 6  •  buPROPion (WELLBUTRIN SR) 150 mg 12 hr tablet, , Disp: , Rfl:   •  Calquence 100 MG TABS, , Disp: , Rfl:   •  fluticasone (Flovent HFA) 220 mcg/act inhaler, Inhale 1 puff once daily Rinse mouth after use., Disp: 12 g, Rfl: 3  •  immune globulin, human (Bivigam) 5 GM/50ML, 15mg IV every 8 weeks, Disp: , Rfl:   •  Immune Globulin, Human, (GAMUNEX-C IJ), , Disp: , Rfl:   •  losartan (COZAAR) 25 mg tablet, Take 25 mg by mouth daily at bedtime, Disp: , Rfl:   •  Multiple Vitamins-Minerals (PRESERVISION AREDS 2 PO), Take by mouth 2 (two) times a day, Disp: , Rfl:   •  rosuvastatin (CRESTOR) 20 MG tablet, , Disp: , Rfl:   •  tadalafil (CIALIS) 5 MG tablet, daily at bedtime, Disp: , Rfl:   •  tamsulosin (FLOMAX) 0.4 mg, Take 0.4 mg by mouth 2 (two) times a day, Disp: , Rfl:     Allergies   Allergen Reactions   • Rocephin [Ceftriaxone]      Avoids d/t previous liver toxicity   Hemoglobin 13.  WBC 7200.  Platelets 151,000.  ANC 5100.  ALC 1400.  Normal chemistry profile except blood sugar 247.  IgG 508.  IgA 16.  IgM 28.      Lab Results: I have reviewed all pertinent labs.  LABS:      Results for orders placed or performed in visit on 01/15/24   Hemoglobin A1C   Result Value Ref Range    Hemoglobin A1C 6.2 (H) Normal 4.0-5.6%; PreDiabetic 5.7-6.4%; Diabetic >=6.5%; Glycemic control for adults with diabetes <7.0% %     mg/dl   Lipid panel    Result Value Ref Range    Cholesterol 138 See Comment mg/dL    Triglycerides 55 See Comment mg/dL    HDL, Direct 78 >=40 mg/dL    LDL Calculated 49 0 - 100 mg/dL    Non-HDL-Chol (CHOL-HDL) 60 mg/dl         Imaging Studies:   Pathology, and Other Studies: I have personally reviewed pertinent reports.      .  Reviewed test results and  discussed with patient and explained    Assessment and Plan:  Patient is here with his wife.  Continuation of care for CLL with low IgG level.  Patient has been on  acalabrutinib 100 mg twice a day and he has been tolerating that well without problem.  He gets acalabrutinib from [place].  He sees Dr. Ennis at [place].  He has history of atrial fibrillation but he is not symptomatic from that.  He is not on blood thinner.  No headaches.  No bleeding.  No other side effects to acalabrutinib.  CLL remains in remission.  For low IgG  he has been getting IVIG therapy once every 2 months and IgG level is staying above 400-the target is 400 and above to lower risk of infections.  When he is in [place] he gets IVIG therapy over there.    He has history of mild asthma and sleep apnea and he uses CPAP at night and sleeps better with that.  Much improved exertional dyspnea.  Has some tiredness .    Physical examination and test results are as recorded and discussed in detail and health screening tests.  No change in therapy,  Acalabrutinib and IVIG therapy.  .   All discussed in detail.  Questions answered.  Discussed the importance of eating healthy foods and staying active as tolerated .  Goal is prolongation of progression-free survival and safety of treatment.  Patient is capable of self-care.  Discussed precautions against coronavirus and other infections.     1. CLL (chronic lymphocytic leukemia) (HCC)      2. IgG deficiency (HCC)      3. Obstructive sleep apnea      4. Primary hypertension      5. History of atrial fibrillation      6. Hyperlipidemia, unspecified hyperlipidemia  type    Patient has standing orders for blood work and IVIG therapy.  He gets acalabrutinib from [facility].  Follow-up in 6 months.      I used the dictation device to dictate this note and there could be mistakes in my note and patient may contact my office for that        Patient voiced understanding and agreement in the discussion.     Counseling / Coordination of Care  . .  Provided counseling and support

## 2024-06-23 DIAGNOSIS — D80.3 IGG DEFICIENCY (HCC): ICD-10-CM

## 2024-06-23 DIAGNOSIS — C91.10 CLL (CHRONIC LYMPHOCYTIC LEUKEMIA) (HCC): Primary | ICD-10-CM

## 2024-07-05 DIAGNOSIS — C91.10 CLL (CHRONIC LYMPHOCYTIC LEUKEMIA) (HCC): Primary | ICD-10-CM

## 2024-07-05 DIAGNOSIS — D80.3 IGG DEFICIENCY (HCC): ICD-10-CM

## 2024-07-05 RX ORDER — ACETAMINOPHEN 325 MG/1
650 TABLET ORAL ONCE
OUTPATIENT
Start: 2024-07-09

## 2024-07-05 RX ORDER — SODIUM CHLORIDE 9 MG/ML
20 INJECTION, SOLUTION INTRAVENOUS ONCE
OUTPATIENT
Start: 2024-07-09

## 2024-07-05 RX ORDER — DIPHENHYDRAMINE HCL 25 MG
12.5 TABLET ORAL ONCE
OUTPATIENT
Start: 2024-07-09

## 2024-07-09 ENCOUNTER — TELEPHONE (OUTPATIENT)
Dept: INFUSION CENTER | Facility: CLINIC | Age: 84
End: 2024-07-09

## 2024-07-09 NOTE — TELEPHONE ENCOUNTER
Patient's spouse called to reschedule IVIG appt for 7/9. Rescheduled to 7/22 as that was the next availability.

## 2024-07-09 NOTE — TELEPHONE ENCOUNTER
Called patient due to missing appointment on 7/9 for IVIG. Patient answered and claimed he was out of town and getting over covid. Gave callback number as then was not a good time to reschedule appointment.

## 2024-07-11 ENCOUNTER — HOSPITAL ENCOUNTER (OUTPATIENT)
Dept: CT IMAGING | Facility: HOSPITAL | Age: 84
End: 2024-07-11
Attending: INTERNAL MEDICINE
Payer: MEDICARE

## 2024-07-11 ENCOUNTER — APPOINTMENT (OUTPATIENT)
Dept: LAB | Facility: HOSPITAL | Age: 84
End: 2024-07-11
Payer: MEDICARE

## 2024-07-11 DIAGNOSIS — U07.1 LAB TEST POSITIVE FOR DETECTION OF COVID-19 VIRUS: ICD-10-CM

## 2024-07-11 DIAGNOSIS — R06.00 DYSPNEA: ICD-10-CM

## 2024-07-11 DIAGNOSIS — U09.9 POST COVID-19 CONDITION, UNSPECIFIED: ICD-10-CM

## 2024-07-11 LAB
ALBUMIN SERPL BCG-MCNC: 4.2 G/DL (ref 3.5–5)
ALP SERPL-CCNC: 59 U/L (ref 34–104)
ALT SERPL W P-5'-P-CCNC: 17 U/L (ref 7–52)
ANION GAP SERPL CALCULATED.3IONS-SCNC: 8 MMOL/L (ref 4–13)
AST SERPL W P-5'-P-CCNC: 15 U/L (ref 13–39)
BASOPHILS # BLD AUTO: 0.01 THOUSANDS/ÂΜL (ref 0–0.1)
BASOPHILS NFR BLD AUTO: 0 % (ref 0–1)
BILIRUB SERPL-MCNC: 1.09 MG/DL (ref 0.2–1)
BUN SERPL-MCNC: 14 MG/DL (ref 5–25)
CALCIUM SERPL-MCNC: 9.1 MG/DL (ref 8.4–10.2)
CHLORIDE SERPL-SCNC: 95 MMOL/L (ref 96–108)
CO2 SERPL-SCNC: 27 MMOL/L (ref 21–32)
CREAT SERPL-MCNC: 0.97 MG/DL (ref 0.6–1.3)
D DIMER PPP FEU-MCNC: 0.63 UG/ML FEU
EOSINOPHIL # BLD AUTO: 0.02 THOUSAND/ÂΜL (ref 0–0.61)
EOSINOPHIL NFR BLD AUTO: 0 % (ref 0–6)
ERYTHROCYTE [DISTWIDTH] IN BLOOD BY AUTOMATED COUNT: 12.9 % (ref 11.6–15.1)
EST. AVERAGE GLUCOSE BLD GHB EST-MCNC: 143 MG/DL
GFR SERPL CREATININE-BSD FRML MDRD: 72 ML/MIN/1.73SQ M
GLUCOSE P FAST SERPL-MCNC: 166 MG/DL (ref 65–99)
HBA1C MFR BLD: 6.6 %
HCT VFR BLD AUTO: 39.2 % (ref 36.5–49.3)
HGB BLD-MCNC: 13 G/DL (ref 12–17)
IMM GRANULOCYTES # BLD AUTO: 0.08 THOUSAND/UL (ref 0–0.2)
IMM GRANULOCYTES NFR BLD AUTO: 1 % (ref 0–2)
LYMPHOCYTES # BLD AUTO: 1.38 THOUSANDS/ÂΜL (ref 0.6–4.47)
LYMPHOCYTES NFR BLD AUTO: 11 % (ref 14–44)
MCH RBC QN AUTO: 30.4 PG (ref 26.8–34.3)
MCHC RBC AUTO-ENTMCNC: 33.2 G/DL (ref 31.4–37.4)
MCV RBC AUTO: 92 FL (ref 82–98)
MONOCYTES # BLD AUTO: 1.32 THOUSAND/ÂΜL (ref 0.17–1.22)
MONOCYTES NFR BLD AUTO: 10 % (ref 4–12)
NEUTROPHILS # BLD AUTO: 10.17 THOUSANDS/ÂΜL (ref 1.85–7.62)
NEUTS SEG NFR BLD AUTO: 78 % (ref 43–75)
NRBC BLD AUTO-RTO: 0 /100 WBCS
PLATELET # BLD AUTO: 179 THOUSANDS/UL (ref 149–390)
PMV BLD AUTO: 10.6 FL (ref 8.9–12.7)
POTASSIUM SERPL-SCNC: 4.1 MMOL/L (ref 3.5–5.3)
PROT SERPL-MCNC: 6.7 G/DL (ref 6.4–8.4)
RBC # BLD AUTO: 4.28 MILLION/UL (ref 3.88–5.62)
SODIUM SERPL-SCNC: 130 MMOL/L (ref 135–147)
WBC # BLD AUTO: 12.98 THOUSAND/UL (ref 4.31–10.16)

## 2024-07-11 PROCEDURE — 80053 COMPREHEN METABOLIC PANEL: CPT

## 2024-07-11 PROCEDURE — 36415 COLL VENOUS BLD VENIPUNCTURE: CPT

## 2024-07-11 PROCEDURE — 85025 COMPLETE CBC W/AUTO DIFF WBC: CPT

## 2024-07-11 PROCEDURE — 85379 FIBRIN DEGRADATION QUANT: CPT

## 2024-07-11 PROCEDURE — 71250 CT THORAX DX C-: CPT

## 2024-07-11 PROCEDURE — 83036 HEMOGLOBIN GLYCOSYLATED A1C: CPT

## 2024-07-15 ENCOUNTER — APPOINTMENT (EMERGENCY)
Dept: RADIOLOGY | Facility: HOSPITAL | Age: 84
End: 2024-07-15
Payer: MEDICARE

## 2024-07-15 ENCOUNTER — HOSPITAL ENCOUNTER (EMERGENCY)
Facility: HOSPITAL | Age: 84
Discharge: HOME | End: 2024-07-15
Attending: EMERGENCY MEDICINE | Admitting: EMERGENCY MEDICINE
Payer: MEDICARE

## 2024-07-15 VITALS
RESPIRATION RATE: 22 BRPM | DIASTOLIC BLOOD PRESSURE: 71 MMHG | OXYGEN SATURATION: 93 % | HEART RATE: 82 BPM | TEMPERATURE: 97.6 F | SYSTOLIC BLOOD PRESSURE: 143 MMHG

## 2024-07-15 DIAGNOSIS — J12.82 PNEUMONIA DUE TO COVID-19 VIRUS: Primary | ICD-10-CM

## 2024-07-15 DIAGNOSIS — R06.00 DYSPNEA: ICD-10-CM

## 2024-07-15 DIAGNOSIS — U07.1 PNEUMONIA DUE TO COVID-19 VIRUS: Primary | ICD-10-CM

## 2024-07-15 LAB
2HR DELTA HS TROPONIN: 0 NG/L
ALBUMIN SERPL BCG-MCNC: 3.9 G/DL (ref 3.5–5)
ALP SERPL-CCNC: 58 U/L (ref 34–104)
ALT SERPL W P-5'-P-CCNC: 14 U/L (ref 7–52)
ANION GAP SERPL CALCULATED.3IONS-SCNC: 8 MMOL/L (ref 4–13)
AST SERPL W P-5'-P-CCNC: 14 U/L (ref 13–39)
BASE EX.OXY STD BLDV CALC-SCNC: 81.3 % (ref 60–80)
BASE EXCESS BLDV CALC-SCNC: 0.4 MMOL/L
BASOPHILS # BLD AUTO: 0.04 THOUSANDS/ÂΜL (ref 0–0.1)
BASOPHILS NFR BLD AUTO: 0 % (ref 0–1)
BILIRUB SERPL-MCNC: 0.63 MG/DL (ref 0.2–1)
BNP SERPL-MCNC: 20 PG/ML (ref 0–100)
BUN SERPL-MCNC: 17 MG/DL (ref 5–25)
CALCIUM SERPL-MCNC: 8.9 MG/DL (ref 8.4–10.2)
CARDIAC TROPONIN I PNL SERPL HS: 6 NG/L
CARDIAC TROPONIN I PNL SERPL HS: 6 NG/L
CHLORIDE SERPL-SCNC: 96 MMOL/L (ref 96–108)
CO2 SERPL-SCNC: 25 MMOL/L (ref 21–32)
CREAT SERPL-MCNC: 1.1 MG/DL (ref 0.6–1.3)
EOSINOPHIL # BLD AUTO: 0.04 THOUSAND/ÂΜL (ref 0–0.61)
EOSINOPHIL NFR BLD AUTO: 0 % (ref 0–6)
ERYTHROCYTE [DISTWIDTH] IN BLOOD BY AUTOMATED COUNT: 12.8 % (ref 11.6–15.1)
GFR SERPL CREATININE-BSD FRML MDRD: 62 ML/MIN/1.73SQ M
GLUCOSE SERPL-MCNC: 153 MG/DL (ref 65–140)
HCO3 BLDV-SCNC: 25.2 MMOL/L (ref 24–30)
HCT VFR BLD AUTO: 37 % (ref 36.5–49.3)
HGB BLD-MCNC: 12.4 G/DL (ref 12–17)
IMM GRANULOCYTES # BLD AUTO: 0.1 THOUSAND/UL (ref 0–0.2)
IMM GRANULOCYTES NFR BLD AUTO: 1 % (ref 0–2)
LYMPHOCYTES # BLD AUTO: 1.47 THOUSANDS/ÂΜL (ref 0.6–4.47)
LYMPHOCYTES NFR BLD AUTO: 13 % (ref 14–44)
MCH RBC QN AUTO: 30.3 PG (ref 26.8–34.3)
MCHC RBC AUTO-ENTMCNC: 33.5 G/DL (ref 31.4–37.4)
MCV RBC AUTO: 91 FL (ref 82–98)
MONOCYTES # BLD AUTO: 1.24 THOUSAND/ÂΜL (ref 0.17–1.22)
MONOCYTES NFR BLD AUTO: 11 % (ref 4–12)
NEUTROPHILS # BLD AUTO: 8.6 THOUSANDS/ÂΜL (ref 1.85–7.62)
NEUTS SEG NFR BLD AUTO: 75 % (ref 43–75)
NRBC BLD AUTO-RTO: 0 /100 WBCS
O2 CT BLDV-SCNC: 14.9 ML/DL
PCO2 BLDV: 41.3 MM HG (ref 42–50)
PH BLDV: 7.4 [PH] (ref 7.3–7.4)
PLATELET # BLD AUTO: 246 THOUSANDS/UL (ref 149–390)
PMV BLD AUTO: 10.4 FL (ref 8.9–12.7)
PO2 BLDV: 46.5 MM HG (ref 35–45)
POTASSIUM SERPL-SCNC: 4.6 MMOL/L (ref 3.5–5.3)
PROCALCITONIN SERPL-MCNC: <0.05 NG/ML
PROT SERPL-MCNC: 6.2 G/DL (ref 6.4–8.4)
RBC # BLD AUTO: 4.09 MILLION/UL (ref 3.88–5.62)
SODIUM SERPL-SCNC: 129 MMOL/L (ref 135–147)
WBC # BLD AUTO: 11.49 THOUSAND/UL (ref 4.31–10.16)

## 2024-07-15 PROCEDURE — 71045 X-RAY EXAM CHEST 1 VIEW: CPT

## 2024-07-15 PROCEDURE — 36415 COLL VENOUS BLD VENIPUNCTURE: CPT

## 2024-07-15 PROCEDURE — 85025 COMPLETE CBC W/AUTO DIFF WBC: CPT | Performed by: EMERGENCY MEDICINE

## 2024-07-15 PROCEDURE — 83880 ASSAY OF NATRIURETIC PEPTIDE: CPT

## 2024-07-15 PROCEDURE — 99285 EMERGENCY DEPT VISIT HI MDM: CPT

## 2024-07-15 PROCEDURE — 93005 ELECTROCARDIOGRAM TRACING: CPT

## 2024-07-15 PROCEDURE — 84484 ASSAY OF TROPONIN QUANT: CPT | Performed by: EMERGENCY MEDICINE

## 2024-07-15 PROCEDURE — 82805 BLOOD GASES W/O2 SATURATION: CPT

## 2024-07-15 PROCEDURE — 84145 PROCALCITONIN (PCT): CPT

## 2024-07-15 PROCEDURE — 80053 COMPREHEN METABOLIC PANEL: CPT | Performed by: EMERGENCY MEDICINE

## 2024-07-15 PROCEDURE — 71275 CT ANGIOGRAPHY CHEST: CPT

## 2024-07-15 PROCEDURE — 99285 EMERGENCY DEPT VISIT HI MDM: CPT | Performed by: EMERGENCY MEDICINE

## 2024-07-15 RX ADMIN — IOHEXOL 85 ML: 350 INJECTION, SOLUTION INTRAVENOUS at 18:43

## 2024-07-15 NOTE — ED PROVIDER NOTES
Problem: Delirium  Goal: # Symptoms of delirium resolved for 24 hours  Description: Evaluate delirium symptoms under active problem when present  Outcome: Outcome Not Met, Continue to Monitor   Pt disoriented to time.   History  Chief Complaint   Patient presents with    Shortness of Breath     Pt got diagnosed with pneumonia on 7/11. Pt is having worsening SOB and dizziness.      Patient is an 83-year-old male past medical history of CLL, hypertension, asthma who presents today for shortness of breath.  He reports a dry cough and shortness of breath.  Patient reports 2 weeks ago he was diagnosed with COVID19.  He reports on Friday he saw his primary care physician and was started on Levaquin.  He has not noticed any improvement of his symptoms.  He admits to taking the antibiotic as prescribed.  He denies headache, chest pain, abdominal pain, lower extremity swelling, nausea/vomiting, sore throat, earache. On initial examination patient was stating 90% on room air patient was placed on 2 L with improvement of O2 stat to 95%. Patient able to speak in full sentences without respiratory distress.           Prior to Admission Medications   Prescriptions Last Dose Informant Patient Reported? Taking?   Acalabrutinib 100 MG CAPS  Self Yes No   Sig: Take 1 capsule by mouth 2 (two) times a day   Arnuity Ellipta 100 MCG/ACT AEPB inhaler  Self No No   Sig: Inhale 1 puff daily Rinse mouth after use.   Calquence 100 MG TABS   Yes No   Immune Globulin, Human, (GAMUNEX-C IJ)   Yes No   Multiple Vitamins-Minerals (PRESERVISION AREDS 2 PO)  Self Yes No   Sig: Take by mouth 2 (two) times a day   albuterol (ProAir HFA) 90 mcg/act inhaler  Self No No   Sig: Inhale 2 puffs every 4 (four) hours as needed for wheezing   buPROPion (WELLBUTRIN SR) 150 mg 12 hr tablet   Yes No   fluticasone (Flovent HFA) 220 mcg/act inhaler   No No   Sig: Inhale 1 puff once daily Rinse mouth after use.   immune globulin, human (Bivigam) 5 GM/50ML  Self Yes No   Sig: 15mg IV every 8 weeks   losartan (COZAAR) 25 mg tablet   Yes No   Sig: Take 25 mg by mouth daily at bedtime   rosuvastatin (CRESTOR) 20 MG tablet  Self Yes No   tadalafil (CIALIS) 5 MG tablet  Self Yes No    Sig: daily at bedtime   tamsulosin (FLOMAX) 0.4 mg   Yes No   Sig: Take 0.4 mg by mouth 2 (two) times a day      Facility-Administered Medications: None       Past Medical History:   Diagnosis Date    Arthritis     BPH (benign prostatic hyperplasia)     Cancer (HCC)     Chronic lymphocytic leukemia (CLL), B-cell (HCC)     CPAP (continuous positive airway pressure) dependence     Depression     Hearing aid worn     bilateral    Hyperlipidemia     controlled    Hypertension     controlled    Sinusitis     Sleep apnea     CPAP    Tinnitus     Wears glasses        Past Surgical History:   Procedure Laterality Date    BACK SURGERY  09/2017    laminotomy L3,4 S1-    CARPAL TUNNEL RELEASE Bilateral     CATARACT EXTRACTION      left    CATARACT EXTRACTION W/ INTRAOCULAR LENS IMPLANT Left 12/11/2017    Procedure: EXTRACTION EXTRACAPSULAR CATARACT PHACO INTRAOCULAR LENS (IOL);  Surgeon: Blade Quintana MD;  Location: [facility] MAIN OR;  Service: Ophthalmology    CERVICAL DISCECTOMY  1982    C5-6    COLONOSCOPY      HERNIA REPAIR      l inguinal    IR LOWER EXTREMITY / INTERVENTION  12/2/2019    MS EXCISION MALIGNANT LESION F/E/E/N/L 1.1-2.0 CM Right 1/11/2022    Procedure: EXCISION SKIN LESION CHEEK WITH FROZEN SECTION;  Surgeon: Yvon Pablo MD;  Location: [facility] MAIN OR;  Service: Plastics    MS XCAPSL CTRC RMVL INSJ IO LENS PROSTH W/O ECP Right 11/14/2022    Procedure: EXTRACTION EXTRACAPSULAR CATARACT PHACO INTRAOCULAR LENS (IOL);  Surgeon: Blade Quintana MD;  Location: [facility] MAIN OR;  Service: Ophthalmology    ROTATOR CUFF REPAIR Left 2006    TONSILLECTOMY         Family History   Problem Relation Age of Onset    Cancer Mother         bladder    Heart disease Father     Stroke Father     Parkinsonism Brother      I have reviewed and agree with the history as documented.    E-Cigarette/Vaping    E-Cigarette Use Never User      E-Cigarette/Vaping Substances    Nicotine No     THC No     CBD No     Flavoring No     Other No      Unknown No      Social History     Tobacco Use    Smoking status: Former     Current packs/day: 0.00     Average packs/day: 1 pack/day for 20.0 years (20.0 ttl pk-yrs)     Types: Cigarettes     Start date:      Quit date:      Years since quittin.5    Smokeless tobacco: Never   Vaping Use    Vaping status: Never Used   Substance Use Topics    Alcohol use: Yes     Alcohol/week: 7.0 standard drinks of alcohol     Types: 7 Glasses of wine per week     Comment: occ    Drug use: No        Review of Systems   Constitutional:  Negative for chills, fatigue and fever.   HENT:  Positive for congestion and sinus pressure. Negative for ear pain, rhinorrhea and sore throat.    Respiratory:  Positive for cough and shortness of breath. Negative for chest tightness and wheezing.    Cardiovascular:  Negative for chest pain and leg swelling.   Gastrointestinal:  Negative for abdominal pain, constipation, diarrhea, nausea and vomiting.   Genitourinary:  Negative for dysuria and hematuria.   Musculoskeletal:  Negative for myalgias.   Neurological:  Negative for weakness and headaches.       Physical Exam  ED Triage Vitals   Temperature Pulse Respirations Blood Pressure SpO2   07/15/24 1434 07/15/24 1434 07/15/24 1434 07/15/24 1434 07/15/24 1434   97.6 °F (36.4 °C) 83 (!) 24 128/62 94 %      Temp Source Heart Rate Source Patient Position - Orthostatic VS BP Location FiO2 (%)   07/15/24 1434 07/15/24 1434 07/15/24 1800 07/15/24 1434 --   Temporal Monitor Lying Left arm       Pain Score       --                    Orthostatic Vital Signs  Vitals:    07/15/24 1434 07/15/24 1800 07/15/24 2030   BP: 128/62 155/73 143/71   Pulse: 83 68 82   Patient Position - Orthostatic VS:  Lying Lying       Physical Exam  Vitals reviewed.   Constitutional:       General: He is not in acute distress.     Appearance: He is well-developed and normal weight. He is not ill-appearing.   HENT:      Head: Normocephalic and atraumatic.       Mouth/Throat:      Mouth: Mucous membranes are moist.   Eyes:      Extraocular Movements: Extraocular movements intact.      Pupils: Pupils are equal, round, and reactive to light.   Cardiovascular:      Rate and Rhythm: Normal rate and regular rhythm.   Pulmonary:      Effort: Pulmonary effort is normal. No tachypnea or accessory muscle usage.      Breath sounds: Normal breath sounds. No decreased breath sounds, wheezing, rhonchi or rales.      Comments: Patient placed on 2 L nasal cannula due to hypoxia to 90% on room air.  Abdominal:      Palpations: Abdomen is soft.      Tenderness: There is no abdominal tenderness. There is no guarding.   Musculoskeletal:         General: Normal range of motion.   Skin:     General: Skin is warm.      Capillary Refill: Capillary refill takes less than 2 seconds.   Neurological:      General: No focal deficit present.      Mental Status: He is alert and oriented to person, place, and time.   Psychiatric:         Mood and Affect: Mood normal.         Behavior: Behavior normal.         ED Medications  Medications   iohexol (OMNIPAQUE) 350 MG/ML injection (MULTI-DOSE) 85 mL (85 mL Intravenous Given 7/15/24 1843)       Diagnostic Studies  Results Reviewed       Procedure Component Value Units Date/Time    B-Type Natriuretic Peptide(BNP) [[id number]]  (Normal) Collected: 07/15/24 1617    Lab Status: Final result Specimen: Blood from Arm, Right Updated: 07/15/24 1720     BNP 20 pg/mL     HS Troponin I 2hr [[id number]]  (Normal) Collected: 07/15/24 1617    Lab Status: Final result Specimen: Blood from Arm, Right Updated: 07/15/24 1658     hs TnI 2hr 6 ng/L      Delta 2hr hsTnI 0 ng/L     Procalcitonin [[id number]]  (Normal) Collected: 07/15/24 1617    Lab Status: Final result Specimen: Blood from Arm, Right Updated: 07/15/24 1656     Procalcitonin <0.05 ng/ml     Blood gas, venous [[id number]]  (Abnormal) Collected: 07/15/24 1617    Lab Status: Final result Specimen: Blood from Arm, Right  Updated: 07/15/24 1628     pH, Freddie 7.403     pCO2, Freddie 41.3 mm Hg      pO2, Freddie 46.5 mm Hg      HCO3, Freddie 25.2 mmol/L      Base Excess, Freddie 0.4 mmol/L      O2 Content, Freddie 14.9 ml/dL      O2 HGB, VENOUS 81.3 %     HS Troponin 0hr (reflex protocol) [[id number]]  (Normal) Collected: 07/15/24 1439    Lab Status: Final result Specimen: Blood from Arm, Left Updated: 07/15/24 1522     hs TnI 0hr 6 ng/L     Comprehensive metabolic panel [[id number]]  (Abnormal) Collected: 07/15/24 1439    Lab Status: Final result Specimen: Blood from Arm, Left Updated: 07/15/24 1517     Sodium 129 mmol/L      Potassium 4.6 mmol/L      Chloride 96 mmol/L      CO2 25 mmol/L      ANION GAP 8 mmol/L      BUN 17 mg/dL      Creatinine 1.10 mg/dL      Glucose 153 mg/dL      Calcium 8.9 mg/dL      AST 14 U/L      ALT 14 U/L      Alkaline Phosphatase 58 U/L      Total Protein 6.2 g/dL      Albumin 3.9 g/dL      Total Bilirubin 0.63 mg/dL      eGFR 62 ml/min/1.73sq m     Narrative:      National Kid[employer] Disease Foundation guidelines for Chronic Kidney Disease (CKD):     Stage 1 with normal or high GFR (GFR > 90 mL/min/1.73 square meters)    Stage 2 Mild CKD (GFR = 60-89 mL/min/1.73 square meters)    Stage 3A Moderate CKD (GFR = 45-59 mL/min/1.73 square meters)    Stage 3B Moderate CKD (GFR = 30-44 mL/min/1.73 square meters)    Stage 4 Severe CKD (GFR = 15-29 mL/min/1.73 square meters)    Stage 5 End Stage CKD (GFR <15 mL/min/1.73 square meters)  Note: GFR calculation is accurate only with a steady state creatinine    CBC and differential [[id number]]  (Abnormal) Collected: 07/15/24 1439    Lab Status: Final result Specimen: Blood from Arm, Left Updated: 07/15/24 1457     WBC 11.49 Thousand/uL      RBC 4.09 Million/uL      Hemoglobin 12.4 g/dL      Hematocrit 37.0 %      MCV 91 fL      MCH 30.3 pg      MCHC 33.5 g/dL      RDW 12.8 %      MPV 10.4 fL      Platelets 246 Thousands/uL      nRBC 0 /100 WBCs      Segmented % 75 %      Immature Grans % 1 %       Lymphocytes % 13 %      Monocytes % 11 %      Eosinophils Relative 0 %      Basophils Relative 0 %      Absolute Neutrophils 8.60 Thousands/µL      Absolute Immature Grans 0.10 Thousand/uL      Absolute Lymphocytes 1.47 Thousands/µL      Absolute Monocytes 1.24 Thousand/µL      Eosinophils Absolute 0.04 Thousand/µL      Basophils Absolute 0.04 Thousands/µL                    CTA ED chest PE study   Final Result by Estrella Fields MD (07/15 2010)      No evidence of pulm embolus.      Patchy airspace consolidations at the bilateral lower lobes compatible with multifocal pneumonia                  Workstation performed: [id number]         XR chest 1 view portable   Final Result by Ciara Steward MD (07/15 2105)      Mild patchy bibasilar opacity, compatible with pneumonia, better shown on CT.            Workstation performed: [id number]               Procedures  ECG 12 Lead Documentation Only    Date/Time: 7/15/2024 5:05 PM    Performed by: Shanna Leiva DO  Authorized by: Shanna Leiva DO    ECG reviewed by me, the ED Provider: yes    Patient location:  ED  Previous ECG:     Previous ECG:  Compared to current    Similarity:  Changes noted  Interpretation:     Interpretation: normal    Rate:     ECG rate:  85    ECG rate assessment: normal    Rhythm:     Rhythm: sinus rhythm    Ectopy:     Ectopy: none    QRS:     QRS axis:  Normal    QRS intervals:  Normal  ST segments:     ST segments:  Normal  T waves:     T waves: inverted      Inverted:  AVR and V1        ED Course  ED Course as of 07/16/24 2150   Mon Jul 15, 2024   1648 WBC(!): 11.49   1649 Sodium(!): 129  Mild hyponatremia   1649 XR chest 1 view portable  No acute cardiopulmonary disease. CT from 7/11/24 showed new patchy airspace disease in right lower lobe consistent with infectious process     1657 Procalcitonin  Unremarkable.   1737 B-Type Natriuretic Peptide(BNP)   1745 Gave patient update that we are awaiting his CT results.  Patient is  understanding and resting comfortably on 2 L nasal cannula   2033 Patient stating 94% on room air. Patient would like to be discharged home. He was offered admission but patient does not want admission at this time.             HEART Risk Score      Flowsheet Row Most Recent Value   Heart Score Risk Calculator    History 0 Filed at: 07/15/2024 1737   ECG 0 Filed at: 07/15/2024 1737   Age 2 Filed at: 07/15/2024 1737   Risk Factors 1 Filed at: 07/15/2024 1737   Troponin 0 Filed at: 07/15/2024 1737   HEART Score 3 Filed at: 07/15/2024 1737                PERC Rule for PE      Flowsheet Row Most Recent Value   PERC Rule for PE    Age >=50 1 Filed at: 07/15/2024 1713   HR >=100 0 Filed at: 07/15/2024 1713   O2 Sat on room air < 95% 1 Filed at: 07/15/2024 1713   History of PE or DVT 0 Filed at: 07/15/2024 1713   Recent trauma or surgery 0 Filed at: 07/15/2024 1713   Hemoptysis 0 Filed at: 07/15/2024 1713   Exogenous estrogen 0 Filed at: 07/15/2024 1713   Unilateral leg swelling 0 Filed at: 07/15/2024 1713   PERC Rule for PE Results 2 Filed at: 07/15/2024 1713                    Wells' Criteria for PE      Flowsheet Row Most Recent Value   Wells' Criteria for PE    Clinical signs and symptoms of DVT 0 Filed at: 07/15/2024 1611   PE is primary diagnosis or equally likely 0 Filed at: 07/15/2024 1611   HR >100 0 Filed at: 07/15/2024 1611   Immobilization at least 3 days or Surgery in the previous 4 weeks 0 Filed at: 07/15/2024 1611   Previous, objectively diagnosed PE or DVT 0 Filed at: 07/15/2024 1611   Hemoptysis 0 Filed at: 07/15/2024 1611   Malignancy with treatment within 6 months or palliative 1 Filed at: 07/15/2024 1611   Wells' Criteria Total 1 Filed at: 07/15/2024 1611              Medical Decision Making  Patient is a 83 y.o. male  who presents to the ED with shortness of breath.    Vital signs patient is stating 90% on room air.  Patient placed on 2 L stating 95%. exam as listed above    Differential diagnosis  includes but is not limited to pneumonia, CHF, PE, asthma exacerbation.    Plan patient had labs prior to my evaluation.  Will add on procalcitonin, BNP, VBG.  Will also order CTA PE.  Patient cannot PERC out for PE due to age greater than 50 and O2 stat less than 95% on room air.  Wells criteria total for PE 1.    View ED course above for further discussion on patient workup.     All labs reviewed and utilized in the medical decision making process  All radiology studies independently viewed by me and interpreted by the radiologist.  I reviewed all testing with the patient.     Upon re-evaluation patient is resting comfortably in his bed with 2 L nasal cannula stating 96%.  Patient found to have bilateral lower lobe multifocal pneumonia.  Patient given the option to be admitted to the emergency department but patient would like to be discharged home at this time.  Patient is stable to be discharged home no longer requiring supplemental oxygen.  He is stating 94% on room air.  Clear lungs on auscultation.  Patient in no respiratory distress.  Patient given strict return precautions.  Patient to follow-up with his primary care physician and to continue taking Levaquin as prescribed..        Amount and/or Complexity of Data Reviewed  Labs: ordered. Decision-making details documented in ED Course.  Radiology: ordered. Decision-making details documented in ED Course.    Risk  Prescription drug management.          Disposition  Final diagnoses:   Dyspnea   Pneumonia due to COVID-19 virus     Time reflects when diagnosis was documented in both MDM as applicable and the Disposition within this note       Time User Action Codes Description Comment    7/15/2024  8:32 PM MonetaShanna au Add [R06.00] Dyspnea     7/15/2024  8:33 PM MonetaShanna Wright Add [U07.1,  J12.82] Pneumonia due to COVID-19 virus     7/15/2024  8:33 PM MonetaShanna au Modify [R06.00] Dyspnea     7/15/2024  8:33 PM MonetaShanna au Modify [U07.1,   J12.82] Pneumonia due to COVID-19 virus           ED Disposition       ED Disposition   Discharge    Condition   Stable    Date/Time   Mon Jul 15, 2024 2032    Comment   Mynor Villaseñor discharge to home/self care.                   Follow-up Information       Follow up With Specialties Details Why Contact Info    Usman Catalan MD Internal Medicine Schedule an appointment as soon as possible for a visit in 2 days  [address] [id number] [phone]              Discharge Medication List as of 7/15/2024  8:37 PM        CONTINUE these medications which have NOT CHANGED    Details   Acalabrutinib 100 MG CAPS Take 1 capsule by mouth 2 (two) times a day, Historical Med      albuterol (ProAir HFA) 90 mcg/act inhaler Inhale 2 puffs every 4 (four) hours as needed for wheezing, Starting Tue 6/7/2022, Normal      Arnuity Ellipta 100 MCG/ACT AEPB inhaler Inhale 1 puff daily Rinse mouth after use., Starting Tue 6/7/2022, Until Mon 11/14/2022, Normal      buPROPion (WELLBUTRIN SR) 150 mg 12 hr tablet Starting Tue 9/5/2023, Historical Med      Calquence 100 MG TABS Starting Mon 4/24/2023, Historical Med      fluticasone (Flovent HFA) 220 mcg/act inhaler Inhale 1 puff once daily Rinse mouth after use., Starting Mon 9/25/2023, Normal      immune globulin, human (Bivigam) 5 GM/50ML 15mg IV every 8 weeks, Historical Med      Immune Globulin, Human, (GAMUNEX-C IJ) Historical Med      losartan (COZAAR) 25 mg tablet Take 25 mg by mouth daily at bedtime, Starting Tue 8/22/2023, Historical Med      Multiple Vitamins-Minerals (PRESERVISION AREDS 2 PO) Take by mouth 2 (two) times a day, Historical Med      rosuvastatin (CRESTOR) 20 MG tablet Starting Mon 4/18/2022, Historical Med      tadalafil (CIALIS) 5 MG tablet daily at bedtime, Starting Fri 4/5/2019, Historical Med      tamsulosin (FLOMAX) 0.4 mg Take 0.4 mg by mouth 2 (two) times a day, Starting Thu 7/20/2023, Historical Med           No discharge procedures on  file.    PDMP Review       None             ED Provider  Attending physically available and evaluated Mynor BEE Charleen. I managed the patient along with the ED Attending.    Electronically Signed by           Shanna Leiva DO  07/16/24 1093

## 2024-07-15 NOTE — ED ATTENDING ATTESTATION
7/15/2024  IDav MD, saw and evaluated the patient. I have discussed the patient with the resident/non-physician practitioner and agree with the resident's/non-physician practitioner's findings, Plan of Care, and MDM as documented in the resident's/non-physician practitioner's note, except where noted. All available labs and Radiology studies were reviewed.  I was present for key portions of any procedure(s) performed by the resident/non-physician practitioner and I was immediately available to provide assistance.       At this point I agree with the current assessment done in the Emergency Department.  I have conducted an independent evaluation of this patient a history and physical is as follows:    ED Course  ED Course as of 07/15/24 1621   Mon Jul 15, 2024   1600 Per resident h&p 84 YO M asthma CLL; recent Dx pneumonia not improving since the initiation of antibiotics; worse when he coughs; no fatigue or weakness; no lower extremity swelling; some nasal congesiton;  O: PO 90% on RA improved with O2 administration; lungs CTA; no coughing abd soft and non tender; I/P pneumonia not responding to antibiotics; no wheezing; no h/o CHF; SOB not improving with antibiotics    Emergency Department Note- Mynor Villaseñor 83 y.o. male MRN: [id number]    Unit/Bed#: ED 27 Encounter: [id number]    Mynor Villaseñor is a 83 y.o. male who presents with   Chief Complaint   Patient presents with    Shortness of Breath     Pt got diagnosed with pneumonia on 7/11. Pt is having worsening SOB and dizziness.          History of Present Illness   HPI:  Mynor Villaseñor is a 83 y.o. male who presents for evaluation of:  Worsening dyspnea, cough, and some dizziness associated with the dyspnea.  Any sort of activity provokes his dyspnea.  Patient has a recent COVID-19 infection a couple of weeks ago; of note he was diagnosed with pneumonia in July 11, 2024.  He has a very distant smoking history but has not smoked in many decades.  His  cough is dry and nonproductive of sputum.  He has not been having fevers at home.  He has a history of chronic lymphocytic leukemia diagnosed decades ago.  He denies any hemoptysis and cough productive of sputum.  He denies pleuritic chest pain.  He has no history of venous thromboembolism    Review of Systems   Constitutional:  Negative for fatigue and fever.   HENT:  Negative for congestion and sore throat.    Respiratory:  Positive for cough and shortness of breath.    Cardiovascular:  Negative for chest pain and palpitations.   Gastrointestinal:  Negative for abdominal pain and nausea.   Genitourinary:  Negative for flank pain and frequency.   Neurological:  Positive for light-headedness. Negative for headaches.   Psychiatric/Behavioral:  Negative for dysphoric mood and hallucinations.    All other systems reviewed and are negative.      Historical Information   Past Medical History:   Diagnosis Date    Arthritis     BPH (benign prostatic hyperplasia)     Cancer (HCC)     Chronic lymphocytic leukemia (CLL), B-cell (HCC)     CPAP (continuous positive airway pressure) dependence     Depression     Hearing aid worn     bilateral    Hyperlipidemia     controlled    Hypertension     controlled    Sinusitis     Sleep apnea     CPAP    Tinnitus     Wears glasses      Past Surgical History:   Procedure Laterality Date    BACK SURGERY  09/2017    laminotomy L3,4 S1-    CARPAL TUNNEL RELEASE Bilateral     CATARACT EXTRACTION      left    CATARACT EXTRACTION W/ INTRAOCULAR LENS IMPLANT Left 12/11/2017    Procedure: EXTRACTION EXTRACAPSULAR CATARACT PHACO INTRAOCULAR LENS (IOL);  Surgeon: Blade Quintana MD;  Location: [facility] MAIN OR;  Service: Ophthalmology    CERVICAL DISCECTOMY  1982    C5-6    COLONOSCOPY      HERNIA REPAIR      l inguinal    IR LOWER EXTREMITY / INTERVENTION  12/2/2019    WV EXCISION MALIGNANT LESION F/E/E/N/L 1.1-2.0 CM Right 1/11/2022    Procedure: EXCISION SKIN LESION CHEEK WITH FROZEN SECTION;   Surgeon: Yvon Pablo MD;  Location: [facility] MAIN OR;  Service: Plastics    HI XCAPSL CTRC RMVL INSJ IO LENS PROSTH W/O ECP Right 2022    Procedure: EXTRACTION EXTRACAPSULAR CATARACT PHACO INTRAOCULAR LENS (IOL);  Surgeon: Blade Quintana MD;  Location: [facility] MAIN OR;  Service: Ophthalmology    ROTATOR CUFF REPAIR Left 2006    TONSILLECTOMY       Social History   Social History     Substance and Sexual Activity   Alcohol Use Yes    Alcohol/week: 7.0 standard drinks of alcohol    Types: 7 Glasses of wine per week    Comment: occ     Social History     Substance and Sexual Activity   Drug Use No     Social History     Tobacco Use   Smoking Status Former    Current packs/day: 0.00    Average packs/day: 1 pack/day for 20.0 years (20.0 ttl pk-yrs)    Types: Cigarettes    Start date:     Quit date:     Years since quittin.5   Smokeless Tobacco Never     Family History:   Family History   Problem Relation Age of Onset    Cancer Mother         bladder    Heart disease Father     Stroke Father     Parkinsonism Brother        Meds/Allergies   PTA meds:   Prior to Admission Medications   Prescriptions Last Dose Informant Patient Reported? Taking?   Acalabrutinib 100 MG CAPS  Self Yes No   Sig: Take 1 capsule by mouth 2 (two) times a day   Arnuity Ellipta 100 MCG/ACT AEPB inhaler  Self No No   Sig: Inhale 1 puff daily Rinse mouth after use.   Calquence 100 MG TABS   Yes No   Immune Globulin, Human, (GAMUNEX-C IJ)   Yes No   Multiple Vitamins-Minerals (PRESERVISION AREDS 2 PO)  Self Yes No   Sig: Take by mouth 2 (two) times a day   albuterol (ProAir HFA) 90 mcg/act inhaler  Self No No   Sig: Inhale 2 puffs every 4 (four) hours as needed for wheezing   buPROPion (WELLBUTRIN SR) 150 mg 12 hr tablet   Yes No   fluticasone (Flovent HFA) 220 mcg/act inhaler   No No   Sig: Inhale 1 puff once daily Rinse mouth after use.   immune globulin, human (Bivigam) 5 GM/50ML  Self Yes No   Sig: 15mg IV every 8 weeks   losartan  (COZAAR) 25 mg tablet   Yes No   Sig: Take 25 mg by mouth daily at bedtime   rosuvastatin (CRESTOR) 20 MG tablet  Self Yes No   tadalafil (CIALIS) 5 MG tablet  Self Yes No   Sig: daily at bedtime   tamsulosin (FLOMAX) 0.4 mg   Yes No   Sig: Take 0.4 mg by mouth 2 (two) times a day      Facility-Administered Medications: None     Allergies   Allergen Reactions    Rocephin [Ceftriaxone]      Avoids d/t previous liver toxicity       Objective   First Vitals:   Blood Pressure: 128/62 (07/15/24 1434)  Pulse: 83 (07/15/24 1434)  Temperature: 97.6 °F (36.4 °C) (07/15/24 1434)  Temp Source: Temporal (07/15/24 1434)  Respirations: (!) 24 (07/15/24 1434)  SpO2: 94 % (07/15/24 1434)    Current Vitals:   Blood Pressure: 128/62 (07/15/24 1434)  Pulse: 83 (07/15/24 1434)  Temperature: 97.6 °F (36.4 °C) (07/15/24 1434)  Temp Source: Temporal (07/15/24 1434)  Respirations: (!) 24 (07/15/24 1434)  SpO2: 94 % (07/15/24 1434)    No intake or output data in the 24 hours ending 07/15/24 1621    Invasive Devices       None                   Physical Exam  Vitals and nursing note reviewed.   Constitutional:       General: He is not in acute distress.     Appearance: Normal appearance. He is well-developed.      Comments: Patient is noted to be hypoxic on room air with a pulse ox of 90% that improved with the administration of oxygen therapy.   HENT:      Head: Normocephalic and atraumatic.      Right Ear: External ear normal.      Left Ear: External ear normal.      Nose: Nose normal.      Mouth/Throat:      Pharynx: No oropharyngeal exudate.   Eyes:      Conjunctiva/sclera: Conjunctivae normal.      Pupils: Pupils are equal, round, and reactive to light.   Cardiovascular:      Rate and Rhythm: Normal rate and regular rhythm.   Pulmonary:      Effort: Pulmonary effort is normal. No respiratory distress.   Abdominal:      General: Abdomen is flat. There is no distension.      Palpations: Abdomen is soft.   Musculoskeletal:         General:  No deformity. Normal range of motion.      Cervical back: Normal range of motion and neck supple.   Skin:     General: Skin is warm and dry.      Capillary Refill: Capillary refill takes less than 2 seconds.   Neurological:      General: No focal deficit present.      Mental Status: He is alert and oriented to person, place, and time. Mental status is at baseline.      Coordination: Coordination normal.   Psychiatric:         Mood and Affect: Mood normal.         Behavior: Behavior normal.         Thought Content: Thought content normal.         Judgment: Judgment normal.           Medical Decision Makin.  Acute dizziness and dyspnea secondary to hypoxia:Complete blood count obtained to assess for leukocytosis and anemia; Basic Metabolic profile obtained to assess for electrolyte disturbance, uremia, and disorders of glucose metabolism; electrocardiogram obtained to assess for arrhythmia; Troponin obtained to assess for myocardial infarction and myocarditis.  CT scan chest with IV contrast rule out pulmonary embolism.    Recent Results (from the past 36 hour(s))   ECG 12 lead    Collection Time: 07/15/24  2:29 PM   Result Value Ref Range    Ventricular Rate 89 BPM    Atrial Rate 89 BPM    WY Interval 146 ms    QRSD Interval 88 ms    QT Interval 346 ms    QTC Interval 420 ms    P [place] 67 degrees    QRS Axis 78 degrees    T Wave Axis 65 degrees   CBC and differential    Collection Time: 07/15/24  2:39 PM   Result Value Ref Range    WBC 11.49 (H) 4.31 - 10.16 Thousand/uL    RBC 4.09 3.88 - 5.62 Million/uL    Hemoglobin 12.4 12.0 - 17.0 g/dL    Hematocrit 37.0 36.5 - 49.3 %    MCV 91 82 - 98 fL    MCH 30.3 26.8 - 34.3 pg    MCHC 33.5 31.4 - 37.4 g/dL    RDW 12.8 11.6 - 15.1 %    MPV 10.4 8.9 - 12.7 fL    Platelets 246 149 - 390 Thousands/uL    nRBC 0 /100 WBCs    Segmented % 75 43 - 75 %    Immature Grans % 1 0 - 2 %    Lymphocytes % 13 (L) 14 - 44 %    Monocytes % 11 4 - 12 %    Eosinophils Relative 0 0 - 6 %     "Basophils Relative 0 0 - 1 %    Absolute Neutrophils 8.60 (H) 1.85 - 7.62 Thousands/µL    Absolute Immature Grans 0.10 0.00 - 0.20 Thousand/uL    Absolute Lymphocytes 1.47 0.60 - 4.47 Thousands/µL    Absolute Monocytes 1.24 (H) 0.17 - 1.22 Thousand/µL    Eosinophils Absolute 0.04 0.00 - 0.61 Thousand/µL    Basophils Absolute 0.04 0.00 - 0.10 Thousands/µL   Comprehensive metabolic panel    Collection Time: 07/15/24  2:39 PM   Result Value Ref Range    Sodium 129 (L) 135 - 147 mmol/L    Potassium 4.6 3.5 - 5.3 mmol/L    Chloride 96 96 - 108 mmol/L    CO2 25 21 - 32 mmol/L    ANION GAP 8 4 - 13 mmol/L    BUN 17 5 - 25 mg/dL    Creatinine 1.10 0.60 - 1.30 mg/dL    Glucose 153 (H) 65 - 140 mg/dL    Calcium 8.9 8.4 - 10.2 mg/dL    AST 14 13 - 39 U/L    ALT 14 7 - 52 U/L    Alkaline Phosphatase 58 34 - 104 U/L    Total Protein 6.2 (L) 6.4 - 8.4 g/dL    Albumin 3.9 3.5 - 5.0 g/dL    Total Bilirubin 0.63 0.20 - 1.00 mg/dL    eGFR 62 ml/min/1.73sq m   HS Troponin 0hr (reflex protocol)    Collection Time: 07/15/24  2:39 PM   Result Value Ref Range    hs TnI 0hr 6 \"Refer to ACS Flowchart\"- see link ng/L     XR chest 1 view portable    (Results Pending)   CTA ED chest PE study    (Results Pending)         Portions of the record may have been created with voice recognition software. Occasional wrong word or \"sound a like\" substitutions may have occurred due to the inherent limitations of voice recognition software.  Read the chart carefully and recognize, using context, where substitutions have occurred.          Critical Care Time  Procedures      "

## 2024-07-16 LAB
ATRIAL RATE: 89 BPM
P AXIS: 67 DEGREES
PR INTERVAL: 146 MS
QRS AXIS: 78 DEGREES
QRSD INTERVAL: 88 MS
QT INTERVAL: 346 MS
QTC INTERVAL: 420 MS
T WAVE AXIS: 65 DEGREES
VENTRICULAR RATE: 89 BPM

## 2024-07-16 PROCEDURE — 93010 ELECTROCARDIOGRAM REPORT: CPT | Performed by: INTERNAL MEDICINE

## 2024-07-18 DIAGNOSIS — C91.10 CLL (CHRONIC LYMPHOCYTIC LEUKEMIA) (HCC): Primary | ICD-10-CM

## 2024-07-18 DIAGNOSIS — D80.3 IGG DEFICIENCY (HCC): ICD-10-CM

## 2024-07-18 RX ORDER — SODIUM CHLORIDE 9 MG/ML
20 INJECTION, SOLUTION INTRAVENOUS ONCE
Status: CANCELLED | OUTPATIENT
Start: 2024-07-22

## 2024-07-18 RX ORDER — DIPHENHYDRAMINE HCL 25 MG
12.5 TABLET ORAL ONCE
Status: CANCELLED | OUTPATIENT
Start: 2024-07-22

## 2024-07-18 RX ORDER — ACETAMINOPHEN 325 MG/1
650 TABLET ORAL ONCE
Status: CANCELLED | OUTPATIENT
Start: 2024-07-22

## 2024-07-22 ENCOUNTER — HOSPITAL ENCOUNTER (OUTPATIENT)
Dept: INFUSION CENTER | Facility: CLINIC | Age: 84
Discharge: HOME | End: 2024-07-22
Payer: MEDICARE

## 2024-07-22 VITALS
HEART RATE: 70 BPM | SYSTOLIC BLOOD PRESSURE: 118 MMHG | OXYGEN SATURATION: 96 % | RESPIRATION RATE: 19 BRPM | DIASTOLIC BLOOD PRESSURE: 60 MMHG | WEIGHT: 159 LBS | BODY MASS INDEX: 24.18 KG/M2 | TEMPERATURE: 97.4 F

## 2024-07-22 DIAGNOSIS — C91.10 CLL (CHRONIC LYMPHOCYTIC LEUKEMIA) (HCC): Primary | ICD-10-CM

## 2024-07-22 DIAGNOSIS — D80.3 IGG DEFICIENCY (HCC): ICD-10-CM

## 2024-07-22 PROCEDURE — 96366 THER/PROPH/DIAG IV INF ADDON: CPT

## 2024-07-22 PROCEDURE — 96375 TX/PRO/DX INJ NEW DRUG ADDON: CPT

## 2024-07-22 PROCEDURE — 96365 THER/PROPH/DIAG IV INF INIT: CPT

## 2024-07-22 RX ORDER — SODIUM CHLORIDE 9 MG/ML
20 INJECTION, SOLUTION INTRAVENOUS ONCE
Status: COMPLETED | OUTPATIENT
Start: 2024-07-22 | End: 2024-07-22

## 2024-07-22 RX ORDER — ACETAMINOPHEN 325 MG/1
650 TABLET ORAL ONCE
OUTPATIENT
Start: 2024-09-16

## 2024-07-22 RX ORDER — DIPHENHYDRAMINE HCL 25 MG
12.5 TABLET ORAL ONCE
OUTPATIENT
Start: 2024-09-16

## 2024-07-22 RX ORDER — ACETAMINOPHEN 325 MG/1
650 TABLET ORAL ONCE
Status: COMPLETED | OUTPATIENT
Start: 2024-07-22 | End: 2024-07-22

## 2024-07-22 RX ORDER — SODIUM CHLORIDE 9 MG/ML
20 INJECTION, SOLUTION INTRAVENOUS ONCE
OUTPATIENT
Start: 2024-09-16

## 2024-07-22 RX ORDER — DIPHENHYDRAMINE HCL 25 MG
12.5 TABLET ORAL ONCE
Status: COMPLETED | OUTPATIENT
Start: 2024-07-22 | End: 2024-07-22

## 2024-07-22 RX ADMIN — SODIUM CHLORIDE 20 ML/HR: 0.9 INJECTION, SOLUTION INTRAVENOUS at 07:51

## 2024-07-22 RX ADMIN — Medication 27.5 G: at 08:43

## 2024-07-22 RX ADMIN — ACETAMINOPHEN 650 MG: 325 TABLET ORAL at 07:50

## 2024-07-22 RX ADMIN — DIPHENHYDRAMINE HYDROCHLORIDE 12.5 MG: 25 TABLET ORAL at 07:50

## 2024-07-22 RX ADMIN — HYDROCORTISONE SODIUM SUCCINATE 100 MG: 100 INJECTION, POWDER, FOR SOLUTION INTRAMUSCULAR; INTRAVENOUS at 07:46

## 2024-08-04 ENCOUNTER — HOSPITAL ENCOUNTER (OUTPATIENT)
Dept: MRI IMAGING | Facility: HOSPITAL | Age: 84
Discharge: HOME | End: 2024-08-04
Payer: MEDICARE

## 2024-08-04 DIAGNOSIS — R41.3 AMNESIA MEMORY LOSS: ICD-10-CM

## 2024-08-04 PROCEDURE — 70551 MRI BRAIN STEM W/O DYE: CPT

## 2024-08-13 ENCOUNTER — TRANSCRIBE ORDERS (OUTPATIENT)
Dept: ADMINISTRATIVE | Facility: HOSPITAL | Age: 84
End: 2024-08-13

## 2024-08-13 DIAGNOSIS — I65.21 OCCLUSION OF RIGHT CAROTID ARTERY: ICD-10-CM

## 2024-08-13 DIAGNOSIS — M48.02 SPINAL STENOSIS, CERVICAL REGION: Primary | ICD-10-CM

## 2024-08-13 DIAGNOSIS — E11.65 INADEQUATELY CONTROLLED DIABETES MELLITUS (HCC): ICD-10-CM

## 2024-09-09 ENCOUNTER — HOSPITAL ENCOUNTER (OUTPATIENT)
Dept: CT IMAGING | Facility: HOSPITAL | Age: 84
Discharge: HOME | End: 2024-09-09
Attending: INTERNAL MEDICINE
Payer: MEDICARE

## 2024-09-09 DIAGNOSIS — I65.21 OCCLUSION OF RIGHT CAROTID ARTERY: ICD-10-CM

## 2024-09-09 PROCEDURE — 70498 CT ANGIOGRAPHY NECK: CPT

## 2024-09-09 PROCEDURE — 70496 CT ANGIOGRAPHY HEAD: CPT

## 2024-09-09 RX ADMIN — IOHEXOL 75 ML: 350 INJECTION, SOLUTION INTRAVENOUS at 09:23

## 2024-09-11 ENCOUNTER — APPOINTMENT (OUTPATIENT)
Dept: LAB | Facility: CLINIC | Age: 84
End: 2024-09-11
Payer: MEDICARE

## 2024-09-11 DIAGNOSIS — E11.65 INADEQUATELY CONTROLLED DIABETES MELLITUS (HCC): ICD-10-CM

## 2024-09-11 DIAGNOSIS — M48.02 SPINAL STENOSIS, CERVICAL REGION: ICD-10-CM

## 2024-09-11 DIAGNOSIS — I65.21 OCCLUSION OF RIGHT CAROTID ARTERY: ICD-10-CM

## 2024-09-11 LAB
EST. AVERAGE GLUCOSE BLD GHB EST-MCNC: 128 MG/DL
HBA1C MFR BLD: 6.1 %

## 2024-09-11 PROCEDURE — 36415 COLL VENOUS BLD VENIPUNCTURE: CPT

## 2024-09-11 PROCEDURE — 83036 HEMOGLOBIN GLYCOSYLATED A1C: CPT

## 2024-09-16 ENCOUNTER — HOSPITAL ENCOUNTER (OUTPATIENT)
Dept: MRI IMAGING | Facility: HOSPITAL | Age: 84
Discharge: HOME | End: 2024-09-16
Attending: INTERNAL MEDICINE
Payer: MEDICARE

## 2024-09-16 DIAGNOSIS — M48.02 SPINAL STENOSIS, CERVICAL REGION: ICD-10-CM

## 2024-09-16 PROCEDURE — 72141 MRI NECK SPINE W/O DYE: CPT

## 2024-09-26 DIAGNOSIS — C91.10 CLL (CHRONIC LYMPHOCYTIC LEUKEMIA) (HCC): Primary | ICD-10-CM

## 2024-09-26 DIAGNOSIS — D80.3 IGG DEFICIENCY (HCC): ICD-10-CM

## 2024-09-26 RX ORDER — ACETAMINOPHEN 325 MG/1
650 TABLET ORAL ONCE
OUTPATIENT
Start: 2024-09-30

## 2024-09-26 RX ORDER — SODIUM CHLORIDE 9 MG/ML
20 INJECTION, SOLUTION INTRAVENOUS ONCE
OUTPATIENT
Start: 2024-09-30

## 2024-09-26 RX ORDER — DIPHENHYDRAMINE HCL 25 MG
12.5 TABLET ORAL ONCE
OUTPATIENT
Start: 2024-09-30

## 2024-09-30 ENCOUNTER — HOSPITAL ENCOUNTER (OUTPATIENT)
Dept: INFUSION CENTER | Facility: CLINIC | Age: 84
Discharge: HOME | End: 2024-09-30
Payer: MEDICARE

## 2024-09-30 VITALS
HEIGHT: 68 IN | DIASTOLIC BLOOD PRESSURE: 66 MMHG | OXYGEN SATURATION: 97 % | BODY MASS INDEX: 25.01 KG/M2 | SYSTOLIC BLOOD PRESSURE: 136 MMHG | TEMPERATURE: 98.5 F | RESPIRATION RATE: 18 BRPM | WEIGHT: 165 LBS | HEART RATE: 79 BPM

## 2024-09-30 DIAGNOSIS — C91.10 CLL (CHRONIC LYMPHOCYTIC LEUKEMIA) (HCC): Primary | ICD-10-CM

## 2024-09-30 DIAGNOSIS — D80.3 IGG DEFICIENCY (HCC): ICD-10-CM

## 2024-09-30 PROCEDURE — 96375 TX/PRO/DX INJ NEW DRUG ADDON: CPT

## 2024-09-30 PROCEDURE — 96365 THER/PROPH/DIAG IV INF INIT: CPT

## 2024-09-30 PROCEDURE — 96366 THER/PROPH/DIAG IV INF ADDON: CPT

## 2024-09-30 RX ORDER — ACETAMINOPHEN 325 MG/1
650 TABLET ORAL ONCE
Status: COMPLETED | OUTPATIENT
Start: 2024-09-30 | End: 2024-09-30

## 2024-09-30 RX ORDER — SODIUM CHLORIDE 9 MG/ML
20 INJECTION, SOLUTION INTRAVENOUS ONCE
OUTPATIENT
Start: 2024-11-25

## 2024-09-30 RX ORDER — ACETAMINOPHEN 325 MG/1
650 TABLET ORAL ONCE
OUTPATIENT
Start: 2024-11-25

## 2024-09-30 RX ORDER — DIPHENHYDRAMINE HCL 25 MG
12.5 TABLET ORAL ONCE
Status: COMPLETED | OUTPATIENT
Start: 2024-09-30 | End: 2024-09-30

## 2024-09-30 RX ORDER — DIPHENHYDRAMINE HCL 25 MG
12.5 TABLET ORAL ONCE
OUTPATIENT
Start: 2024-11-25

## 2024-09-30 RX ORDER — SODIUM CHLORIDE 9 MG/ML
20 INJECTION, SOLUTION INTRAVENOUS ONCE
Status: COMPLETED | OUTPATIENT
Start: 2024-09-30 | End: 2024-09-30

## 2024-09-30 RX ADMIN — HYDROCORTISONE SODIUM SUCCINATE 100 MG: 100 INJECTION, POWDER, FOR SOLUTION INTRAMUSCULAR; INTRAVENOUS at 10:49

## 2024-09-30 RX ADMIN — ACETAMINOPHEN 650 MG: 325 TABLET ORAL at 10:40

## 2024-09-30 RX ADMIN — SODIUM CHLORIDE 20 ML/HR: 0.9 INJECTION, SOLUTION INTRAVENOUS at 10:48

## 2024-09-30 RX ADMIN — DIPHENHYDRAMINE HYDROCHLORIDE 12.5 MG: 25 TABLET ORAL at 10:40

## 2024-09-30 RX ADMIN — Medication 27.5 G: at 11:25

## 2024-09-30 NOTE — PROGRESS NOTES
Pt arrives to infusion center for IVIG. Offers no complaints. PIV accessed without issue. Pt sitting comfortably in chair, wheels locked, call bell within reach.

## 2024-09-30 NOTE — PROGRESS NOTES
Patient tolerated IVIG without issue. PIV removed intact, coban wrap in place. Patient aware no future appts scheduled - patient to stop at  to schedule next appt upon DC.

## 2024-11-25 ENCOUNTER — HOSPITAL ENCOUNTER (OUTPATIENT)
Dept: INFUSION CENTER | Facility: CLINIC | Age: 84
Discharge: HOME | End: 2024-11-25
Payer: MEDICARE

## 2024-11-25 VITALS
TEMPERATURE: 97.9 F | OXYGEN SATURATION: 94 % | WEIGHT: 170 LBS | HEART RATE: 71 BPM | BODY MASS INDEX: 25.85 KG/M2 | RESPIRATION RATE: 18 BRPM | DIASTOLIC BLOOD PRESSURE: 69 MMHG | SYSTOLIC BLOOD PRESSURE: 153 MMHG

## 2024-11-25 DIAGNOSIS — C91.10 CLL (CHRONIC LYMPHOCYTIC LEUKEMIA) (HCC): ICD-10-CM

## 2024-11-25 DIAGNOSIS — D80.3 IGG DEFICIENCY (HCC): Primary | ICD-10-CM

## 2024-11-25 PROCEDURE — 96366 THER/PROPH/DIAG IV INF ADDON: CPT

## 2024-11-25 PROCEDURE — 96365 THER/PROPH/DIAG IV INF INIT: CPT

## 2024-11-25 PROCEDURE — 96375 TX/PRO/DX INJ NEW DRUG ADDON: CPT

## 2024-11-25 RX ORDER — SODIUM CHLORIDE 9 MG/ML
20 INJECTION, SOLUTION INTRAVENOUS ONCE
OUTPATIENT
Start: 2025-01-20

## 2024-11-25 RX ORDER — SODIUM CHLORIDE 9 MG/ML
20 INJECTION, SOLUTION INTRAVENOUS ONCE
Status: COMPLETED | OUTPATIENT
Start: 2024-11-25 | End: 2024-11-25

## 2024-11-25 RX ORDER — ACETAMINOPHEN 325 MG/1
650 TABLET ORAL ONCE
OUTPATIENT
Start: 2025-01-20

## 2024-11-25 RX ORDER — ACETAMINOPHEN 325 MG/1
650 TABLET ORAL ONCE
Status: COMPLETED | OUTPATIENT
Start: 2024-11-25 | End: 2024-11-25

## 2024-11-25 RX ORDER — HYDROCORTISONE SODIUM SUCCINATE 100 MG/2ML
100 INJECTION INTRAMUSCULAR; INTRAVENOUS ONCE
Status: COMPLETED | OUTPATIENT
Start: 2024-11-25 | End: 2024-11-25

## 2024-11-25 RX ORDER — DIPHENHYDRAMINE HCL 25 MG
12.5 TABLET ORAL ONCE
OUTPATIENT
Start: 2025-01-20

## 2024-11-25 RX ORDER — HYDROCORTISONE SODIUM SUCCINATE 100 MG/2ML
100 INJECTION INTRAMUSCULAR; INTRAVENOUS ONCE
OUTPATIENT
Start: 2025-01-20

## 2024-11-25 RX ORDER — DIPHENHYDRAMINE HCL 25 MG
12.5 TABLET ORAL ONCE
Status: COMPLETED | OUTPATIENT
Start: 2024-11-25 | End: 2024-11-25

## 2024-11-25 RX ADMIN — ACETAMINOPHEN 650 MG: 325 TABLET ORAL at 10:26

## 2024-11-25 RX ADMIN — DIPHENHYDRAMINE HYDROCHLORIDE 12.5 MG: 25 TABLET ORAL at 10:26

## 2024-11-25 RX ADMIN — SODIUM CHLORIDE 20 ML/HR: 0.9 INJECTION, SOLUTION INTRAVENOUS at 10:26

## 2024-11-25 RX ADMIN — Medication 30 G: at 11:07

## 2024-11-25 RX ADMIN — HYDROCORTISONE SODIUM SUCCINATE 100 MG: 100 INJECTION, POWDER, FOR SOLUTION INTRAMUSCULAR; INTRAVENOUS at 10:26

## 2024-11-25 NOTE — PROGRESS NOTES
Pt here for IVIG, offers no complaints, denies recent infection or antibiotic use, resting in chair

## 2024-12-02 ENCOUNTER — OFFICE VISIT (OUTPATIENT)
Dept: HEMATOLOGY ONCOLOGY | Facility: CLINIC | Age: 84
End: 2024-12-02
Payer: MEDICARE

## 2024-12-02 VITALS
HEIGHT: 68 IN | DIASTOLIC BLOOD PRESSURE: 68 MMHG | BODY MASS INDEX: 25.69 KG/M2 | HEART RATE: 86 BPM | OXYGEN SATURATION: 97 % | RESPIRATION RATE: 16 BRPM | SYSTOLIC BLOOD PRESSURE: 146 MMHG | TEMPERATURE: 97.1 F | WEIGHT: 169.5 LBS

## 2024-12-02 DIAGNOSIS — C91.10 CLL (CHRONIC LYMPHOCYTIC LEUKEMIA) (HCC): Primary | ICD-10-CM

## 2024-12-02 DIAGNOSIS — E78.5 HYPERLIPIDEMIA, UNSPECIFIED HYPERLIPIDEMIA TYPE: ICD-10-CM

## 2024-12-02 DIAGNOSIS — D80.3 IGG DEFICIENCY (HCC): ICD-10-CM

## 2024-12-02 DIAGNOSIS — I10 PRIMARY HYPERTENSION: ICD-10-CM

## 2024-12-02 DIAGNOSIS — Z99.89 CPAP (CONTINUOUS POSITIVE AIRWAY PRESSURE) DEPENDENCE: ICD-10-CM

## 2024-12-02 DIAGNOSIS — G47.33 OBSTRUCTIVE SLEEP APNEA: ICD-10-CM

## 2024-12-02 DIAGNOSIS — Z86.79 HISTORY OF ATRIAL FIBRILLATION: ICD-10-CM

## 2024-12-02 PROCEDURE — 99214 OFFICE O/P EST MOD 30 MIN: CPT | Performed by: INTERNAL MEDICINE

## 2024-12-02 PROCEDURE — G2211 COMPLEX E/M VISIT ADD ON: HCPCS | Performed by: INTERNAL MEDICINE

## 2024-12-02 NOTE — ASSESSMENT & PLAN NOTE
Patient has standing orders for blood work and IVIG therapy.  He is going to send a message via [employer] to specialist at [facility] to see if acalabrutinib dose can be changed to once a day from twice a day.  He will stop acalabrutinib for 7 days prior to carotid artery surgery and will not go back for 7 days or longer if there is a bleeding problem and he will call our office or speak with his surgeon when it will be safe to go back on acalabrutinib.  Follow-up in 6-month.  No change in dose of acalabrutinib in the meantime.  Discussed eating healthy diet and activities as tolerated and to avoid falls and trauma.  Goal is CLL in remission.  Patient is capable of self-care.  Above explained to patient in detail.  Questions answered.  Provided counseling and support.  Patient will continue to follow-up with his primary physician and other consultants.  I used a dictation device to dictate this note and there could be mistakes in my note and for that patient may contact my office.

## 2024-12-02 NOTE — PATIENT INSTRUCTIONS
Patient has standing orders for blood work and IVIG therapy.  He is going to send a message via [employer] to specialist at [place] to see if acalabrutinib dose can be changed to once a day from twice a day.  He will stop acalabrutinib for 7 days prior to carotid artery surgery and will not go back for 7 days or longer if there is a bleeding problem and he will call our office or speak with his surgeon when it will be safe to go back on acalabrutinib.  Follow-up in 6-month.

## 2024-12-02 NOTE — PROGRESS NOTES
Name: Mynor Villaseñor      : 1940      MRN: [id number]  Encounter Provider: Mauri Whittaker MD  Encounter Date: 2024   Encounter department: [facility] [place]     :  Assessment & Plan  CLL (chronic lymphocytic leukemia) (HCC)    Orders:    CBC and differential; Standing    Comprehensive metabolic panel; Standing    IgG, IgA, IgM; Standing    LD,Blood; Standing    Uric acid; Standing    IgG deficiency (HCC)    Orders:    IgG, IgA, IgM; Standing    Obstructive sleep apnea         Primary hypertension         History of atrial fibrillation         Hyperlipidemia, unspecified hyperlipidemia type         CPAP (continuous positive airway pressure) dependence       Patient has standing orders for blood work and IVIG therapy.  He is going to send a message via [employer] to specialist at [place] to see if acalabrutinib dose can be changed to once a day from twice a day.  He will stop acalabrutinib for 7 days prior to carotid artery surgery and will not go back for 7 days or longer if there is a bleeding problem and he will call our office or speak with his surgeon when it will be safe to go back on acalabrutinib.  Follow-up in 6-month.  No change in dose of acalabrutinib in the meantime.  Discussed eating healthy diet and activities as tolerated and to avoid falls and trauma.  Goal is CLL in remission.  Patient is capable of self-care.  Above explained to patient in detail.  Questions answered.  Provided counseling and support.  Patient will continue to follow-up with his primary physician and other consultants.  I used a dictation device to dictate this note and there could be mistakes in my note and for that patient may contact my office.      History of Present Illness     Reason for Visit / CC: CLL with a low IgG level and patient is on acalabrutinib.    HPI  Mynor Villaseñor is a 84 y.o. male.  Patient is here with his wife.  This is a follow-up visit for CLL and low IgG level.   "Patient takes acalabrutinib 100 mg twice a day and that has not been any problem with bleeding, new atrial fibrillation, headache, uncontrolled hypertension and other side effects.  Patient has history of atrial fibrillation but is in sinus rhythm now.  He has been taking baby aspirin but no bleeding.  He follows with specialist at [place].  CLL remains in remission.  Patient has been receiving IVIG therapy for low IgG level and he has been tolerating that without much problem.  No infusion reactions.  Not having frequent infections on this program IVIG every 2 months.  When [place] goes to [place] in [place].  He uses CPAP at night for sleep apnea.  Last exertional dyspnea and less tiredness.  Has arthritic symptoms.  Pertinent Medical History   CLL.  IgG deficiency.  Obstructive sleep apnea.  Primary hypertension.  History of atrial fibrillation.  Hyperlipidemia.  Bilateral carotid artery disease.  He will be having surgery on the left side.  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  That has collaterals.      Review of Systems A complete review of systems is negative other than that noted above in the HPI.  Reviewed 12 systems.  No fevers, chills, bleeding, bone pains, skin rash, weight loss, night sweats, no weakness, no frequent infections,  no swelling of the ankles and no swollen glands.        Objective   /68 (BP Location: Right arm, Patient Position: Sitting, Cuff Size: Adult)   Pulse 86   Temp (!) 97.1 °F (36.2 °C) (Temporal)   Resp 16   Ht 5' 8\" (1.727 m)   Wt 76.9 kg (169 lb 8 oz)   SpO2 97%   BMI 25.77 kg/m²     Blood pressure 146/68, pulse 86, temperature (!) 97.1 °F (36.2 °C), temperature source Temporal, resp. rate 16, height 5' 8\" (1.727 m), weight 76.9 kg (169 lb 8 oz), SpO2 97%.  ECOG    Physical Exam  Constitutional:       Appearance: Normal appearance.   HENT:      Head: Normocephalic and atraumatic.      Mouth/Throat:      Comments: No oral thrush.  Eyes:      " General: No scleral icterus.  Cardiovascular:      Rate and Rhythm: Normal rate and regular rhythm.      Heart sounds: Normal heart sounds. No murmur heard.  Pulmonary:      Effort: Pulmonary effort is normal. No respiratory distress.      Breath sounds: Normal breath sounds.      Comments: No rales or rhonchi.  Abdominal:      General: Abdomen is flat. There is no distension.      Palpations: Abdomen is soft. There is no mass.      Tenderness: There is no abdominal tenderness.   Musculoskeletal:      Cervical back: Normal range of motion.      Right lower leg: No edema.      Left lower leg: No edema.      Comments: No palpable lymphadenopathy in the neck and axillary areas.   Lymphadenopathy:      Cervical: No cervical adenopathy.   Skin:     Findings: No bruising or rash.   Neurological:      General: No focal deficit present.      Mental Status: He is alert.      Motor: No weakness.      Gait: Gait normal.   Psychiatric:         Mood and Affect: Mood normal.         Behavior: Behavior normal.          Labs:     CBC AND DIFFERENTIAL  Status: Final result         suggestion  Result Information displayed in this report will not trend and may not trigger automated decision support.      Contains abnormal data CBC AND DIFFERENTIAL  Order: [id number]  Component  Ref Range & Units 10/21/24  3:46 PM   White Blood Cells  4.0 - 11.0 10*3/uL 7.7   RBC  4.30 - 5.80 10*6/uL 4.21 Low    Hemoglobin  13.5 - 17.5 g/dL 12.9 Low    Hematocrit  40 - 52 % 38 Low    MCV  80 - 100 fL 91   MCH  27 - 33 pg 31   MCHC  31 - 36 g/dL 34   RDW  11.5 - 14.5 % 14.2   Platelet  150 - 400 10*3/uL 180   Narrative    Test performed at [facility], [address] [place] [address]  Status: Final result         suggestion  Information displayed in this report may not trend or trigger automated decision support.      Contains abnormal data MANUAL LEUKOCYTE DIFFERENTIAL  Order: [id number]  Component  Ref Range &  Units 10/21/24  3:46 PM   % Segmented Neutrophils Manual  % 80.2   % Band Neutrophils  0.0 - 5.0 % 0.0   % Lymphocytes Manual  % 11.9   % Lymphocytes Reactive  0.0 - 3.0 % 0.0   % Monocytes Manual  % 7.9   % Eosinophils Manual  % 0.0   % Basophils Manual  % 0.0   # Segmented Neutrophils Manual  1.80 - 7.50 10*3/uL 6.19   # Band Neutrophils  10*3/uL 0.00   # Lymphocytes Manual  1.00 - 5.00 10*3/uL 0.92 Low    # Lymphocytes Reactive  10*3/uL 0.00   # Monocytes Manual  0.10 - 1.00 10*3/uL 0.61   # Eosinophils Manual  0.00 - 0.40 10*3/uL 0.00   # Basophils Manual  0.00 - 0.20 10*3/uL 0.00   % Other Cells  0.0 - 0.0 % 0.0   Platelet Estimate Normal   RBC Morphology See Below Abnormal    Hypochromia SLIGHT   Stomatocytes PRESENT   ANC Manual Diff  1.80 - 7.50 10*3/uL 6.19   Narrative    Test performed at [facility], [address] [place] [address]  LACTATE DEHYDROGENASE  Order: [id number]  Component  Ref Range & Units 10/21/24  3:46 PM   LDH  98 - 192 U/L 163   Narrative    Test performed at [facility], [address] [place] [id number]  ntains abnormal data URIC ACID  Order: [id number]  Component  Ref Range & Units 10/21/24  3:46 PM   URIC ACID  4.8 - 8.7 mg/dL 3.9 Low    Narrative    Test performed at [facility], [address] [place] [id number]  COMPREHENSIVE METABOLIC PANEL  Status: Final result         Contains abnormal data COMPREHENSIVE METABOLIC PANEL  Order: [id number]   Status: Final result       Next appt: 01/20/2025 at 09:00 AM in Infusion Therapy (AN INF CHAIR 17)              Component  Ref Range & Units (hover) 10/21/24  3:46 PM 9/23/24  1:20 PM 9/11/24  7:42 AM 7/15/24  2:39 PM 7/11/24 12:31 PM 1/15/24  7:49 AM 12/6/23 10:22 AM   Glucose 114 High  99  153 High  R, CM   159 High    BUN 16 15 16 R 17 R 14 R 15 R 16   Creatinine 1.02 0.92 0.90 R, CM 1.10 R, CM 0.97 R, CM 0.90 R, CM  1.05   Sodium 136 137 140 R 129 Low  R 130 Low  R 136 R 137   Potassium 4.4 4.3 4.3 R 4.6 R 4.1 R 4.5 R 4.5   Chloride 98 Low  99 Low  103 R 96 R 95 Low  R 98 R 101   Carbon Dioxide 31 26 29 R 25 R 27 R 30 R 29   ANION GAP 7 12 CM 8 R 8 R 8 R 8 R 7 CM   Comment: With hypoalbuminemia, anion gap correction is suggested using Na - (Cl+CO2) +  2.5*(4 - Albumin).  The lab reports Na - (Cl+CO2).   Calcium 9.6 9.4 9.2 R 8.9 R 9.1 R 9.5 R 9.0   Protein, Total 6.5  6.4 R 6.2 Low  R 6.7 R 6.8 R 6.0 Low    ALBUMIN 4.5  4.4 R 3.9 R 4.2 R 4.5 R 4.3   ALANINE AMINOTRANSFERASE 23  19 R, CM 14 R, CM 17 R, CM 26 R, CM 19   ASPAR AMINOTRANSFERASE 21  20 R 14 R 15 R 30 R 19   Alkaline Phosphatase 59  58 R 58 R 59 R 64 R 63   Total Bilirubin 0.8  0.57 R, CM 0.63 R, CM 1.09 High  R, CM 0.87 R, CM 0.6

## 2024-12-02 NOTE — ASSESSMENT & PLAN NOTE
Orders:    CBC and differential; Standing    Comprehensive metabolic panel; Standing    IgG, IgA, IgM; Standing    LD,Blood; Standing    Uric acid; Standing

## 2024-12-16 ENCOUNTER — APPOINTMENT (EMERGENCY)
Dept: RADIOLOGY | Facility: HOSPITAL | Age: 84
End: 2024-12-16
Payer: MEDICARE

## 2024-12-16 ENCOUNTER — HOSPITAL ENCOUNTER (OUTPATIENT)
Facility: HOSPITAL | Age: 84
Setting detail: OBSERVATION
Discharge: HOME | End: 2024-12-17
Attending: INTERNAL MEDICINE | Admitting: INTERNAL MEDICINE
Payer: MEDICARE

## 2024-12-16 ENCOUNTER — APPOINTMENT (EMERGENCY)
Dept: CT IMAGING | Facility: HOSPITAL | Age: 84
End: 2024-12-16
Payer: MEDICARE
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
## 2024-12-16 DIAGNOSIS — R26.2 INABILITY TO AMBULATE DUE TO RIGHT HIP: ICD-10-CM

## 2024-12-16 LAB
ALBUMIN SERPL BCG-MCNC: 4 G/DL (ref 3.5–5)
ALP SERPL-CCNC: 67 U/L (ref 34–104)
ALT SERPL W P-5'-P-CCNC: 22 U/L (ref 7–52)
ANION GAP SERPL CALCULATED.3IONS-SCNC: 8 MMOL/L (ref 4–13)
AST SERPL W P-5'-P-CCNC: 16 U/L (ref 13–39)
BASOPHILS # BLD AUTO: 0.06 THOUSANDS/ΜL (ref 0–0.1)
BASOPHILS NFR BLD AUTO: 0 % (ref 0–1)
BILIRUB SERPL-MCNC: 0.55 MG/DL (ref 0.2–1)
BUN SERPL-MCNC: 17 MG/DL (ref 5–25)
CALCIUM SERPL-MCNC: 9.2 MG/DL (ref 8.4–10.2)
CHLORIDE SERPL-SCNC: 99 MMOL/L (ref 96–108)
CO2 SERPL-SCNC: 29 MMOL/L (ref 21–32)
CREAT SERPL-MCNC: 0.88 MG/DL (ref 0.6–1.3)
EOSINOPHIL # BLD AUTO: 0.24 THOUSAND/ΜL (ref 0–0.61)
EOSINOPHIL NFR BLD AUTO: 1 % (ref 0–6)
ERYTHROCYTE [DISTWIDTH] IN BLOOD BY AUTOMATED COUNT: 13.5 % (ref 11.6–15.1)
GFR SERPL CREATININE-BSD FRML MDRD: 79 ML/MIN/1.73SQ M
GLUCOSE SERPL-MCNC: 145 MG/DL (ref 65–140)
HCT VFR BLD AUTO: 34.5 % (ref 36.5–49.3)
HGB BLD-MCNC: 10.8 G/DL (ref 12–17)
IMM GRANULOCYTES # BLD AUTO: 0.09 THOUSAND/UL (ref 0–0.2)
IMM GRANULOCYTES NFR BLD AUTO: 1 % (ref 0–2)
LYMPHOCYTES # BLD AUTO: 4.76 THOUSANDS/ΜL (ref 0.6–4.47)
LYMPHOCYTES NFR BLD AUTO: 27 % (ref 14–44)
MCH RBC QN AUTO: 29.2 PG (ref 26.8–34.3)
MCHC RBC AUTO-ENTMCNC: 31.3 G/DL (ref 31.4–37.4)
MCV RBC AUTO: 93 FL (ref 82–98)
MONOCYTES # BLD AUTO: 1.87 THOUSAND/ΜL (ref 0.17–1.22)
MONOCYTES NFR BLD AUTO: 11 % (ref 4–12)
NEUTROPHILS # BLD AUTO: 10.85 THOUSANDS/ΜL (ref 1.85–7.62)
NEUTS SEG NFR BLD AUTO: 60 % (ref 43–75)
NRBC BLD AUTO-RTO: 0 /100 WBCS
PLATELET # BLD AUTO: 222 THOUSANDS/UL (ref 149–390)
PMV BLD AUTO: 10.9 FL (ref 8.9–12.7)
POTASSIUM SERPL-SCNC: 4.3 MMOL/L (ref 3.5–5.3)
PROT SERPL-MCNC: 6.5 G/DL (ref 6.4–8.4)
RBC # BLD AUTO: 3.7 MILLION/UL (ref 3.88–5.62)
SODIUM SERPL-SCNC: 136 MMOL/L (ref 135–147)
WBC # BLD AUTO: 17.87 THOUSAND/UL (ref 4.31–10.16)

## 2024-12-16 PROCEDURE — 36415 COLL VENOUS BLD VENIPUNCTURE: CPT | Performed by: INTERNAL MEDICINE

## 2024-12-16 PROCEDURE — 80053 COMPREHEN METABOLIC PANEL: CPT | Performed by: INTERNAL MEDICINE

## 2024-12-16 PROCEDURE — 99285 EMERGENCY DEPT VISIT HI MDM: CPT | Performed by: INTERNAL MEDICINE

## 2024-12-16 PROCEDURE — 73502 X-RAY EXAM HIP UNI 2-3 VIEWS: CPT

## 2024-12-16 PROCEDURE — 73700 CT LOWER EXTREMITY W/O DYE: CPT

## 2024-12-16 PROCEDURE — 99284 EMERGENCY DEPT VISIT MOD MDM: CPT

## 2024-12-16 PROCEDURE — 93005 ELECTROCARDIOGRAM TRACING: CPT

## 2024-12-16 PROCEDURE — 96374 THER/PROPH/DIAG INJ IV PUSH: CPT

## 2024-12-16 PROCEDURE — 70450 CT HEAD/BRAIN W/O DYE: CPT

## 2024-12-16 PROCEDURE — 96361 HYDRATE IV INFUSION ADD-ON: CPT

## 2024-12-16 PROCEDURE — 71045 X-RAY EXAM CHEST 1 VIEW: CPT

## 2024-12-16 PROCEDURE — 85025 COMPLETE CBC W/AUTO DIFF WBC: CPT | Performed by: INTERNAL MEDICINE

## 2024-12-16 RX ORDER — SODIUM CHLORIDE 9 MG/ML
125 INJECTION, SOLUTION INTRAVENOUS CONTINUOUS
Status: DISCONTINUED | OUTPATIENT
Start: 2024-12-16 | End: 2024-12-17

## 2024-12-16 RX ADMIN — SODIUM CHLORIDE 125 ML/HR: 0.9 INJECTION, SOLUTION INTRAVENOUS at 21:45

## 2024-12-16 RX ADMIN — MORPHINE SULFATE 2 MG: 2 INJECTION, SOLUTION INTRAMUSCULAR; INTRAVENOUS at 21:50

## 2024-12-17 VITALS
HEIGHT: 70 IN | SYSTOLIC BLOOD PRESSURE: 144 MMHG | HEART RATE: 72 BPM | OXYGEN SATURATION: 96 % | BODY MASS INDEX: 24.21 KG/M2 | DIASTOLIC BLOOD PRESSURE: 72 MMHG | TEMPERATURE: 97.6 F | WEIGHT: 169.09 LBS | RESPIRATION RATE: 16 BRPM

## 2024-12-17 PROBLEM — E11.9 TYPE 2 DIABETES MELLITUS, WITHOUT LONG-TERM CURRENT USE OF INSULIN (HCC): Status: ACTIVE | Noted: 2024-12-17

## 2024-12-17 PROBLEM — I65.29 CAROTID STENOSIS: Status: ACTIVE | Noted: 2024-12-17

## 2024-12-17 PROBLEM — D72.829 LEUKOCYTOSIS: Status: ACTIVE | Noted: 2024-12-17

## 2024-12-17 PROBLEM — R26.2 IMPAIRED AMBULATION: Status: ACTIVE | Noted: 2024-12-17
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
## 2024-12-17 PROBLEM — D64.9 ANEMIA: Status: ACTIVE | Noted: 2024-12-17

## 2024-12-17 LAB
ANION GAP SERPL CALCULATED.3IONS-SCNC: 9 MMOL/L (ref 4–13)
ATRIAL RATE: 92 BPM
BASOPHILS # BLD AUTO: 0.05 THOUSANDS/ΜL (ref 0–0.1)
BASOPHILS NFR BLD AUTO: 0 % (ref 0–1)
BILIRUB UR QL STRIP: NEGATIVE
BUN SERPL-MCNC: 13 MG/DL (ref 5–25)
CALCIUM SERPL-MCNC: 8.6 MG/DL (ref 8.4–10.2)
CHLORIDE SERPL-SCNC: 99 MMOL/L (ref 96–108)
CLARITY UR: CLEAR
CO2 SERPL-SCNC: 28 MMOL/L (ref 21–32)
COLOR UR: YELLOW
CREAT SERPL-MCNC: 0.79 MG/DL (ref 0.6–1.3)
EOSINOPHIL # BLD AUTO: 0.21 THOUSAND/ΜL (ref 0–0.61)
EOSINOPHIL NFR BLD AUTO: 1 % (ref 0–6)
ERYTHROCYTE [DISTWIDTH] IN BLOOD BY AUTOMATED COUNT: 13.5 % (ref 11.6–15.1)
EST. AVERAGE GLUCOSE BLD GHB EST-MCNC: 137 MG/DL
FERRITIN SERPL-MCNC: 49 NG/ML (ref 24–336)
GFR SERPL CREATININE-BSD FRML MDRD: 83 ML/MIN/1.73SQ M
GLUCOSE SERPL-MCNC: 137 MG/DL (ref 65–140)
GLUCOSE SERPL-MCNC: 142 MG/DL (ref 65–140)
GLUCOSE SERPL-MCNC: 174 MG/DL (ref 65–140)
GLUCOSE SERPL-MCNC: 193 MG/DL (ref 65–140)
GLUCOSE UR STRIP-MCNC: ABNORMAL MG/DL
HBA1C MFR BLD: 6.4 %
HCT VFR BLD AUTO: 30.5 % (ref 36.5–49.3)
HGB BLD-MCNC: 9.7 G/DL (ref 12–17)
HGB UR QL STRIP.AUTO: NEGATIVE
IMM GRANULOCYTES # BLD AUTO: 0.05 THOUSAND/UL (ref 0–0.2)
IMM GRANULOCYTES NFR BLD AUTO: 0 % (ref 0–2)
IRON SATN MFR SERPL: 6 % (ref 15–50)
IRON SERPL-MCNC: 20 UG/DL (ref 50–212)
KETONES UR STRIP-MCNC: NEGATIVE MG/DL
LEUKOCYTE ESTERASE UR QL STRIP: NEGATIVE
LYMPHOCYTES # BLD AUTO: 3.78 THOUSANDS/ΜL (ref 0.6–4.47)
LYMPHOCYTES NFR BLD AUTO: 24 % (ref 14–44)
MCH RBC QN AUTO: 29.4 PG (ref 26.8–34.3)
MCHC RBC AUTO-ENTMCNC: 31.8 G/DL (ref 31.4–37.4)
MCV RBC AUTO: 92 FL (ref 82–98)
MONOCYTES # BLD AUTO: 1.8 THOUSAND/ΜL (ref 0.17–1.22)
MONOCYTES NFR BLD AUTO: 11 % (ref 4–12)
NEUTROPHILS # BLD AUTO: 10.09 THOUSANDS/ΜL (ref 1.85–7.62)
NEUTS SEG NFR BLD AUTO: 64 % (ref 43–75)
NITRITE UR QL STRIP: NEGATIVE
NRBC BLD AUTO-RTO: 0 /100 WBCS
P AXIS: 77 DEGREES
PH UR STRIP.AUTO: 6 [PH]
PLATELET # BLD AUTO: 205 THOUSANDS/UL (ref 149–390)
PLATELET # BLD AUTO: 209 THOUSANDS/UL (ref 149–390)
PMV BLD AUTO: 11.7 FL (ref 8.9–12.7)
PMV BLD AUTO: 11.9 FL (ref 8.9–12.7)
POTASSIUM SERPL-SCNC: 3.9 MMOL/L (ref 3.5–5.3)
PR INTERVAL: 160 MS
PROT UR STRIP-MCNC: NEGATIVE MG/DL
QRS AXIS: 86 DEGREES
QRSD INTERVAL: 94 MS
QT INTERVAL: 352 MS
QTC INTERVAL: 435 MS
RBC # BLD AUTO: 3.3 MILLION/UL (ref 3.88–5.62)
SODIUM SERPL-SCNC: 136 MMOL/L (ref 135–147)
SP GR UR STRIP.AUTO: 1.02 (ref 1–1.03)
T WAVE AXIS: 94 DEGREES
TIBC SERPL-MCNC: 345.8 UG/DL (ref 250–450)
TRANSFERRIN SERPL-MCNC: 247 MG/DL (ref 203–362)
UIBC SERPL-MCNC: 326 UG/DL (ref 155–355)
UROBILINOGEN UR QL STRIP.AUTO: 0.2 E.U./DL
VENTRICULAR RATE: 92 BPM
WBC # BLD AUTO: 15.98 THOUSAND/UL (ref 4.31–10.16)

## 2024-12-17 PROCEDURE — 97167 OT EVAL HIGH COMPLEX 60 MIN: CPT

## 2024-12-17 PROCEDURE — 83036 HEMOGLOBIN GLYCOSYLATED A1C: CPT

## 2024-12-17 PROCEDURE — 82948 REAGENT STRIP/BLOOD GLUCOSE: CPT

## 2024-12-17 PROCEDURE — 80048 BASIC METABOLIC PNL TOTAL CA: CPT

## 2024-12-17 PROCEDURE — 99222 1ST HOSP IP/OBS MODERATE 55: CPT

## 2024-12-17 PROCEDURE — NC001 PR NO CHARGE

## 2024-12-17 PROCEDURE — 83540 ASSAY OF IRON: CPT

## 2024-12-17 PROCEDURE — 81003 URINALYSIS AUTO W/O SCOPE: CPT

## 2024-12-17 PROCEDURE — 93010 ELECTROCARDIOGRAM REPORT: CPT | Performed by: INTERNAL MEDICINE

## 2024-12-17 PROCEDURE — 83550 IRON BINDING TEST: CPT

## 2024-12-17 PROCEDURE — 82728 ASSAY OF FERRITIN: CPT

## 2024-12-17 PROCEDURE — 85025 COMPLETE CBC W/AUTO DIFF WBC: CPT

## 2024-12-17 PROCEDURE — 85049 AUTOMATED PLATELET COUNT: CPT

## 2024-12-17 RX ORDER — ACETAMINOPHEN 325 MG/1
975 TABLET ORAL EVERY 8 HOURS SCHEDULED
Status: DISCONTINUED | OUTPATIENT
Start: 2024-12-17 | End: 2024-12-17 | Stop reason: HOSPADM

## 2024-12-17 RX ORDER — SENNOSIDES 8.8 MG/5ML
8.8 LIQUID ORAL DAILY
Status: DISCONTINUED | OUTPATIENT
Start: 2024-12-17 | End: 2024-12-17 | Stop reason: HOSPADM

## 2024-12-17 RX ORDER — OXYCODONE HYDROCHLORIDE 5 MG/1
2.5 TABLET ORAL EVERY 4 HOURS PRN
Status: DISCONTINUED | OUTPATIENT
Start: 2024-12-17 | End: 2024-12-17 | Stop reason: HOSPADM

## 2024-12-17 RX ORDER — OXYCODONE HYDROCHLORIDE 5 MG/1
5 TABLET ORAL EVERY 4 HOURS PRN
Status: DISCONTINUED | OUTPATIENT
Start: 2024-12-17 | End: 2024-12-17 | Stop reason: HOSPADM

## 2024-12-17 RX ORDER — INSULIN LISPRO 100 [IU]/ML
1-5 INJECTION, SOLUTION INTRAVENOUS; SUBCUTANEOUS
Status: DISCONTINUED | OUTPATIENT
Start: 2024-12-17 | End: 2024-12-17 | Stop reason: HOSPADM

## 2024-12-17 RX ORDER — ALBUTEROL SULFATE 90 UG/1
2 INHALANT RESPIRATORY (INHALATION) EVERY 4 HOURS PRN
Status: DISCONTINUED | OUTPATIENT
Start: 2024-12-17 | End: 2024-12-17 | Stop reason: HOSPADM

## 2024-12-17 RX ORDER — TAMSULOSIN HYDROCHLORIDE 0.4 MG/1
0.4 CAPSULE ORAL DAILY
Status: DISCONTINUED | OUTPATIENT
Start: 2024-12-17 | End: 2024-12-17 | Stop reason: HOSPADM

## 2024-12-17 RX ORDER — HEPARIN SODIUM 5000 [USP'U]/ML
5000 INJECTION, SOLUTION INTRAVENOUS; SUBCUTANEOUS EVERY 8 HOURS SCHEDULED
Status: DISCONTINUED | OUTPATIENT
Start: 2024-12-17 | End: 2024-12-17 | Stop reason: HOSPADM

## 2024-12-17 RX ORDER — ATORVASTATIN CALCIUM 40 MG/1
40 TABLET, FILM COATED ORAL
Status: DISCONTINUED | OUTPATIENT
Start: 2024-12-17 | End: 2024-12-17 | Stop reason: HOSPADM

## 2024-12-17 RX ORDER — DOCUSATE SODIUM 100 MG/1
100 CAPSULE, LIQUID FILLED ORAL 2 TIMES DAILY
Status: DISCONTINUED | OUTPATIENT
Start: 2024-12-17 | End: 2024-12-17 | Stop reason: HOSPADM

## 2024-12-17 RX ORDER — BUPROPION HYDROCHLORIDE 150 MG/1
150 TABLET ORAL DAILY
Status: DISCONTINUED | OUTPATIENT
Start: 2024-12-18 | End: 2024-12-17 | Stop reason: HOSPADM

## 2024-12-17 RX ORDER — ACETAMINOPHEN 325 MG/1
975 TABLET ORAL EVERY 8 HOURS SCHEDULED
Qty: 270 TABLET | Refills: 0 | Status: SHIPPED | OUTPATIENT
Start: 2024-12-17

## 2024-12-17 RX ORDER — LOSARTAN POTASSIUM 25 MG/1
25 TABLET ORAL
Status: DISCONTINUED | OUTPATIENT
Start: 2024-12-18 | End: 2024-12-17 | Stop reason: HOSPADM

## 2024-12-17 RX ADMIN — ACETAMINOPHEN 975 MG: 325 TABLET, FILM COATED ORAL at 11:56

## 2024-12-17 RX ADMIN — DICLOFENAC SODIUM 2 G: 10 GEL TOPICAL at 11:57

## 2024-12-17 RX ADMIN — DOCUSATE SODIUM 100 MG: 100 CAPSULE, LIQUID FILLED ORAL at 08:20

## 2024-12-17 RX ADMIN — ACETAMINOPHEN 975 MG: 325 TABLET, FILM COATED ORAL at 02:13

## 2024-12-17 RX ADMIN — SENNOSIDES 8.8 MG: 8.8 SYRUP ORAL at 08:20

## 2024-12-17 RX ADMIN — DICLOFENAC SODIUM 2 G: 10 GEL TOPICAL at 08:20

## 2024-12-17 RX ADMIN — TAMSULOSIN HYDROCHLORIDE 0.4 MG: 0.4 CAPSULE ORAL at 08:20

## 2024-12-17 NOTE — ASSESSMENT & PLAN NOTE
Lab Results   Component Value Date    HGBA1C 6.4 (H) 12/17/2024   Not on current medication, diet controlled  SSI coverage, QID blood sugar checks  Avoid hypoglycemia  (P) [id number]

## 2024-12-17 NOTE — H&P
"H&P - Hospitalist   Name: Mynor Villaseñor 84 y.o. male I MRN: [id number]  Unit/Bed#: -01 I Date of Admission: 12/16/2024   Date of Service: 12/17/2024 I Hospital Day: 0     Assessment & Plan  Impaired ambulation  POA s/p mechanical fall this afternoon, patient tripped outside on the pavement landing on his right hip.  Patient able to ambulate after fall however a couple hours later he is unable to bear weight on the right leg due to the severe shooting pain in right leg and hip area  Denies hitting head, LOC, or dizziness  Ambulates independently at baseline  Assessment of right hip, tenderness noted upon palpitation, no abrasion, edema,  or bruising.  Patient unable to lift rigth leg up or back. Unable to tolerate weightbearing to the right leg  X-ray Right hip: no acute fracture noted, awaiting final read  CT R lower extremities: \"No acute fracture or subluxation of the right hip.Moderate to severe osteoarthritic degenerative changes of the right hip joint.\"  Suspect soft tissue injury right hip possbile ligaments  Plan  Pain management 975 mg Tyelnol q 8 hrs  PRN Oxy 2.5 mg and  5mg q 4 h, and break through IV morphine 2 mg q6 hr  PT consulted  Voltaren cream to affected joint  Fall precautions  Consider orthopedics if pain does not improve     Fall  POA s/p post mechanical fall this afternoon patient tripped outside on the pavement landing on his right hip.  Patient able to ambulate after fall however a couple hours later he is unable to bear weight on the right leg due to the severe shooting pain/cramping  Denies hitting head, LOC, or dizziness  Ambulates independently at baseline  CT head No acute intracranial abnormality. Mild chronic small vessel ischemic changes and volume loss.  X-ray Right hip: no acute fracture noted, awaiting final read  CT R lower extremities: \"No acute fracture or subluxation of the right hip.Moderate to severe osteoarthritic degenerative changes of the right hip joint.\"  See plan " above  Fall precautions    CLL (chronic lymphocytic leukemia) ([facility])  Follow with hemo onc  Received IVIG q 2 months not due for another month  Home medications: Acalbrutinib continue  Obstructive sleep apnea  Noted  Wear CPAP at night    PAD (peripheral artery disease) ([facility])  Follow with Vascular  Home medication ASA and Statin continued  History of atrial fibrillation  remote history of afib thought to be 2/2 prior drug use to treat CLL, not on anticoagulation,    Carotid stenosis  s/p L CEA 12/6/24 at [facility]  Continue ASA and statin  Type 2 diabetes mellitus, without long-term current use of insulin ([facility])  Lab Results   Component Value Date    HGBA1C 6.1 (H) 09/11/2024   Not on current medication, diet controlled  SSI coverage, QID blood sugar checks  Avoid hypoglycemia    Leukocytosis  Present admission with elevated WBCs at 17.87  Chest x-ray: No acute findings  UA unremarkable  Suspect reactionary and in the setting of CLL  No indication for antibiotics at this time  Monitor WBCs and fever curve  Anemia  POA with hgb 10.8, baseline 13.8  No signs of active bleeding, suspect secondary to CLL  Monitor H&H  Obtain iron panel  Transfuse for hgb less than 7      VTE Pharmacologic Prophylaxis:   High Risk (Score >/= 5) - Pharmacological DVT Prophylaxis Ordered: heparin. Sequential Compression Devices Ordered.  Code Status: Level 1 - Full Code with patient and wife  Discussion with family: Updated  (wife) at bedside.    Anticipated Length of Stay: Patient will be admitted on an observation basis with an anticipated length of stay of less than 2 midnights secondary to s/p fall unable to ambulate .    History of Present Illness   Chief Complaint: s/p fall onto right hip and now unable to ambulate or bear weight to right leg    Mynor Villaseñor is a 84 y.o. male with a PMH of CLL, IgG deficiency, DM, HTN, HLD, carotid stenosis who presents with s/p fall onto right hip and now unable to ambulate bear weight to  right leg.  Patient presented to the ED after tripping over a step fell on the concrete on his right hip.  Initially able to ambulate,  but walked a few steps, and developed severe right sided leg pain, and unable to bear weight to right leg.  Denies hitting his head.  He also denies headache, dizziness, loss of consciousness. CT of the right lower extremity no acute fracture of subluxation of the right hip.  Patient unable to stand on right leg or ambulate in the ED due to severe right sided hip pain.  Admitted for further medical management of ambulation dysfunction and pain management    Review of Systems   Constitutional:  Negative for chills and fever.   HENT:  Negative for ear pain and sore throat.    Eyes:  Negative for pain and visual disturbance.   Respiratory:  Negative for cough and shortness of breath.    Cardiovascular:  Negative for chest pain and palpitations.   Gastrointestinal:  Negative for abdominal pain and vomiting.   Genitourinary:  Negative for dysuria and hematuria.   Musculoskeletal:  Positive for arthralgias and gait problem. Negative for back pain.   Skin:  Negative for color change and rash.   Neurological:  Negative for seizures and syncope.   All other systems reviewed and are negative.      Historical Information   Past Medical History:   Diagnosis Date    Arthritis     BPH (benign prostatic hyperplasia)     Cancer (HCC)     Chronic lymphocytic leukemia (CLL), B-cell (HCC)     CPAP (continuous positive airway pressure) dependence     Depression     Hearing aid worn     bilateral    Hyperlipidemia     controlled    Hypertension     controlled    Sinusitis     Sleep apnea     CPAP    Tinnitus     Wears glasses      Past Surgical History:   Procedure Laterality Date    BACK SURGERY  09/2017    laminotomy L3,4 S1-    CARPAL TUNNEL RELEASE Bilateral     CATARACT EXTRACTION      left    CATARACT EXTRACTION W/ INTRAOCULAR LENS IMPLANT Left 12/11/2017    Procedure: EXTRACTION EXTRACAPSULAR  CATARACT PHACO INTRAOCULAR LENS (IOL);  Surgeon: Blade Quintana MD;  Location: [facility] MAIN OR;  Service: Ophthalmology    CERVICAL DISCECTOMY      C5-6    COLONOSCOPY      HERNIA REPAIR      l inguinal    IR LOWER EXTREMITY / INTERVENTION  2019    NH EXCISION MALIGNANT LESION F/E/E/N/L 1.1-2.0 CM Right 2022    Procedure: EXCISION SKIN LESION CHEEK WITH FROZEN SECTION;  Surgeon: Yvon Pablo MD;  Location: [facility] MAIN OR;  Service: Plastics    NH XCAPSL CTRC RMVL INSJ IO LENS PROSTH W/O ECP Right 2022    Procedure: EXTRACTION EXTRACAPSULAR CATARACT PHACO INTRAOCULAR LENS (IOL);  Surgeon: Blade Quintana MD;  Location: [facility] MAIN OR;  Service: Ophthalmology    ROTATOR CUFF REPAIR Left 2006    TONSILLECTOMY       Social History     Tobacco Use    Smoking status: Former     Current packs/day: 0.00     Average packs/day: 1 pack/day for 20.0 years (20.0 ttl pk-yrs)     Types: Cigarettes     Start date:      Quit date:      Years since quittin.9    Smokeless tobacco: Never   Vaping Use    Vaping status: Never Used   Substance and Sexual Activity    Alcohol use: Yes     Alcohol/week: 7.0 standard drinks of alcohol     Types: 7 Glasses of wine per week     Comment: occ    Drug use: No    Sexual activity: Yes     Partners: Female     E-Cigarette/Vaping    E-Cigarette Use Never User      E-Cigarette/Vaping Substances    Nicotine No     THC No     CBD No     Flavoring No     Other No     Unknown No        Social History:  Marital Status: /Civil Union   Occupation: retired  Patient Pre-hospital Living Situation: Assisted Living  Patient Pre-hospital Level of Mobility: walks  Patient Pre-hospital Diet Restrictions: none    Meds/Allergies   I have reviewed home medications with patient personally.  Prior to Admission medications    Medication Sig Start Date End Date Taking? Authorizing Provider   Acalabrutinib 100 MG CAPS Take 1 capsule by mouth 2 (two) times a day   Yes Historical Provider,  MD   buPROPion (WELLBUTRIN SR) 150 mg 12 hr tablet  9/5/23  Yes Historical Provider, MD   Immune Globulin, Human, (GAMUNEX-C IJ)    Yes Historical Provider, MD   losartan (COZAAR) 25 mg tablet Take 25 mg by mouth daily at bedtime 8/22/23  Yes Historical Provider, MD   Multiple Vitamins-Minerals (PRESERVISION AREDS 2 PO) Take by mouth 2 (two) times a day   Yes Historical Provider, MD   rosuvastatin (CRESTOR) 20 MG tablet  4/18/22  Yes Historical Provider, MD   tamsulosin (FLOMAX) 0.4 mg Take 0.4 mg by mouth in the morning 7/20/23  Yes Historical Provider, MD   albuterol (ProAir HFA) 90 mcg/act inhaler Inhale 2 puffs every 4 (four) hours as needed for wheezing 6/7/22   Jatin Segovia DO   Arnuity Ellipta 100 MCG/ACT AEPB inhaler Inhale 1 puff daily Rinse mouth after use. 6/7/22 11/14/22  Jatin Segovia DO   Calquence 100 MG TABS  4/24/23   Historical Provider, MD   fluticasone (Flovent HFA) 220 mcg/act inhaler Inhale 1 puff once daily Rinse mouth after use. 9/25/23   Jatin Segovia DO   immune globulin, human (Bivigam) 5 GM/50ML 15mg IV every 8 weeks 5/17/21   Historical Provider, MD   tadalafil (CIALIS) 5 MG tablet daily at bedtime 4/5/19   Historical Provider, MD   amLODIPine (NORVASC) 10 mg tablet Take 10 mg by mouth every morning  Patient not taking: Reported on 1/10/2022   6/21/22  Historical Provider, MD   Cholecalciferol (Vitamin D3) 50 MCG (2000 UT) capsule Take 2,000 Units by mouth daily  6/21/22  Historical Provider, MD   Ibrutinib 140 MG CAPS Take by mouth every morning  6/21/22  Historical Provider, MD     Allergies   Allergen Reactions    Rocephin [Ceftriaxone]      Avoids d/t previous liver toxicity       Objective :  Temp:  [98.9 °F (37.2 °C)-99.2 °F (37.3 °C)] 98.9 °F (37.2 °C)  HR:  [96] 96  BP: (157-168)/(72-78) 157/78  Resp:  [18-21] 21  SpO2:  [92 %-97 %] 92 %  O2 Device: None (Room air)    Physical Exam  Vitals and nursing note reviewed.   Constitutional:       General: He is not in  acute distress.     Appearance: He is well-developed.   HENT:      Head: Normocephalic and atraumatic.   Eyes:      Conjunctiva/sclera: Conjunctivae normal.   Cardiovascular:      Rate and Rhythm: Normal rate and regular rhythm.      Heart sounds: No murmur heard.  Pulmonary:      Effort: Pulmonary effort is normal. No respiratory distress.      Breath sounds: Normal breath sounds.   Abdominal:      Palpations: Abdomen is soft.      Tenderness: There is no abdominal tenderness.   Musculoskeletal:         General: No swelling.      Cervical back: Neck supple.      Right hip: Tenderness present. No deformity. Decreased range of motion. Decreased strength.      Right lower leg: No swelling or deformity.      Left lower leg: No swelling.   Skin:     General: Skin is warm and dry.      Capillary Refill: Capillary refill takes less than 2 seconds.   Neurological:      Mental Status: He is alert.   Psychiatric:         Mood and Affect: Mood normal.          Lines/Drains:            Lab Results: I have reviewed the following results:  Results from last 7 days   Lab Units 12/16/24  2149   WBC Thousand/uL 17.87*   HEMOGLOBIN g/dL 10.8*   HEMATOCRIT % 34.5*   PLATELETS Thousands/uL 222   SEGS PCT % 60   LYMPHO PCT % 27   MONO PCT % 11   EOS PCT % 1     Results from last 7 days   Lab Units 12/16/24  2148   SODIUM mmol/L 136   POTASSIUM mmol/L 4.3   CHLORIDE mmol/L 99   CO2 mmol/L 29   BUN mg/dL 17   CREATININE mg/dL 0.88   ANION GAP mmol/L 8   CALCIUM mg/dL 9.2   ALBUMIN g/dL 4.0   TOTAL BILIRUBIN mg/dL 0.55   ALK PHOS U/L 67   ALT U/L 22   AST U/L 16   GLUCOSE RANDOM mg/dL 145*             Lab Results   Component Value Date    HGBA1C 6.1 (H) 09/11/2024    HGBA1C 6.6 (H) 07/11/2024    HGBA1C 6.2 (H) 01/15/2024           Imaging Results Review: I reviewed radiology reports from this admission including: ct right lower extremity .  Other Study Results Review: EKG was reviewed.  EKG was personally reviewed and my interpretation  is: Personally Reviewed. NSR. HR 92..    Administrative Statements       ** Please Note: This note has been constructed using a voice recognition system. **

## 2024-12-17 NOTE — PROGRESS NOTES
"Progress Note - Hospitalist   Name: Mynor Villaseñor 84 y.o. male I MRN: [id number]  Unit/Bed#: -01 I Date of Admission: 12/16/2024   Date of Service: 12/17/2024 I Hospital Day: 0  { ?Quick Links I Problem List I PORCH I Billing Tip:15154}  Assessment & Plan  Impaired ambulation  POA s/p mechanical fall this afternoon, patient tripped outside on the pavement landing on his right hip.  Patient able to ambulate after fall however a couple hours later he is unable to bear weight on the right leg due to the severe shooting pain in right leg and hip area  Denies hitting head, LOC, or dizziness  Ambulates independently at baseline  Assessment of right hip, tenderness noted upon palpitation, no abrasion, edema,  or bruising.  Patient unable to lift rigth leg up or back. Unable to tolerate weightbearing to the right leg  X-ray Right hip: no acute fracture noted, awaiting final read  CT R lower extremities: \"No acute fracture or subluxation of the right hip.Moderate to severe osteoarthritic degenerative changes of the right hip joint.\"  Suspect soft tissue injury right hip possbile ligaments  Plan  Pain management 975 mg Tyelnol q 8 hrs  PRN Oxy 2.5 mg and  5mg q 4 h, and break through IV morphine 2 mg q6 hr  PT consulted  Voltaren cream to affected joint  Fall precautions  Significant improvement will plan for discharge tomorrow if patient continues to improve PT OT recommendations  Fall  POA s/p post mechanical fall this afternoon patient tripped outside on the pavement landing on his right hip.  Patient able to ambulate after fall however a couple hours later he is unable to bear weight on the right leg due to the severe shooting pain/cramping  Denies hitting head, LOC, or dizziness  Ambulates independently at baseline  CT head No acute intracranial abnormality. Mild chronic small vessel ischemic changes and volume loss.  X-ray Right hip: no acute fracture noted, awaiting final read  CT R lower extremities: \"No acute " "fracture or subluxation of the right hip.Moderate to severe osteoarthritic degenerative changes of the right hip joint.\"  See plan above  Fall precautions    CLL (chronic lymphocytic leukemia) ([facility])  Follow with hemo onc  Received IVIG q 2 months not due for another month  Home medications: Acalbrutinib continue  Obstructive sleep apnea  Noted  Wear CPAP at night    PAD (peripheral artery disease) ([facility])  Follow with Vascular  Home medication ASA and Statin continued  History of atrial fibrillation  remote history of afib thought to be 2/2 prior drug use to treat CLL, not on anticoagulation,    Carotid stenosis  s/p L CEA 12/6/24 at [facility]  Continue ASA and statin  Type 2 diabetes mellitus, without long-term current use of insulin ([facility])  Lab Results   Component Value Date    HGBA1C 6.1 (H) 09/11/2024   Not on current medication, diet controlled  SSI coverage, QID blood sugar checks  Avoid hypoglycemia  (P) 158  Leukocytosis  Present admission with elevated WBCs at 17.87  Chest x-ray: No acute findings  UA unremarkable  Suspect reactionary and in the setting of CLL  No indication for antibiotics at this time  Monitor WBCs and fever curve  Anemia  POA with hgb 10.8, baseline 13.8  No signs of active bleeding, suspect secondary to CLL  Monitor H&H  Obtain iron panel  Transfuse for hgb less than 7    VTE Pharmacologic Prophylaxis:   Moderate Risk (Score 3-4) - Pharmacological DVT Prophylaxis Ordered: heparin.    Mobility:   Basic Mobility Inpatient Raw Score: 15  -HLM Goal: 4: Move to chair/commode  JH-HLM Achieved: 2: Bed activities/Dependent transfer  JH-HLM Goal NOT achieved. Continue with multidisciplinary rounding and encourage appropriate mobility to improve upon JH-HLM goals.    Patient Centered Rounds: I performed bedside rounds with nursing staff today.      Education and Discussions with Family / Patient: Patient declined call to .     Current Length of Stay: 0 day(s)  Current Patient Status: " Observation   Certification Statement: The patient will continue to require additional inpatient hospital stay due to monitoring pain and PT OT eval  Discharge Plan: Anticipate discharge tomorrow to discharge location to be determined pending rehab evaluations.    Code Status: Level 1 - Full Code    Subjective   ***    Objective :{?Quick Links I ICU Summary I Vitals I I/Os I LDAs I Mobility (PT/OT) I Code Status / ACP   ?Quick Links I Active Meds I Pain Meds I Antibiotics I Anticoagulants:21082}  Temp:  [97.6 °F (36.4 °C)-99.2 °F (37.3 °C)] 97.6 °F (36.4 °C)  HR:  [72-96] 72  BP: (144-168)/(72-78) 144/72  Resp:  [16-21] 16  SpO2:  [92 %-97 %] 96 %  O2 Device: None (Room air)    Body mass index is 24.26 kg/m².     Input and Output Summary (last 24 hours):     Intake/Output Summary (Last 24 hours) at 12/17/2024 1038  Last data filed at 12/17/2024 0810  Gross per 24 hour   Intake 360 ml   Output 650 ml   Net -290 ml       Physical Exam  Vitals and nursing note reviewed.   Constitutional:       General: He is not in acute distress.     Appearance: Normal appearance. He is well-developed. He is not ill-appearing or toxic-appearing.   HENT:      Head: Normocephalic and atraumatic.   Eyes:      Conjunctiva/sclera: Conjunctivae normal.   Cardiovascular:      Rate and Rhythm: Normal rate and regular rhythm.      Heart sounds: Normal heart sounds. No murmur heard.     No friction rub. No gallop.   Pulmonary:      Effort: Pulmonary effort is normal. No respiratory distress.      Breath sounds: Normal breath sounds. No wheezing or rales.   Abdominal:      General: There is no distension.      Palpations: Abdomen is soft.      Tenderness: There is no abdominal tenderness. There is no guarding.   Musculoskeletal:      Cervical back: Neck supple.      Right lower leg: No edema.      Left lower leg: No edema.   Skin:     General: Skin is warm and dry.   Neurological:      Mental Status: He is alert and oriented to person, place,  and time.   Psychiatric:         Mood and Affect: Mood normal.         Behavior: Behavior normal.            Lines/Drains:            {?Quick Links I Lab Review I Micro Results I Radiology I Cardiology:95349}  Lab Results: I have reviewed the following results:   Results from last 7 days   Lab Units 12/17/24  0444   WBC Thousand/uL 15.98*   HEMOGLOBIN g/dL 9.7*   HEMATOCRIT % 30.5*   PLATELETS Thousands/uL 205  209   SEGS PCT % 64   LYMPHO PCT % 24   MONO PCT % 11   EOS PCT % 1     Results from last 7 days   Lab Units 12/17/24  0444 12/16/24  2148   SODIUM mmol/L 136 136   POTASSIUM mmol/L 3.9 4.3   CHLORIDE mmol/L 99 99   CO2 mmol/L 28 29   BUN mg/dL 13 17   CREATININE mg/dL 0.79 0.88   ANION GAP mmol/L 9 8   CALCIUM mg/dL 8.6 9.2   ALBUMIN g/dL  --  4.0   TOTAL BILIRUBIN mg/dL  --  0.55   ALK PHOS U/L  --  67   ALT U/L  --  22   AST U/L  --  16   GLUCOSE RANDOM mg/dL 137 145*         Results from last 7 days   Lab Units 12/17/24  0720 12/17/24  0207   POC GLUCOSE mg/dl 174* 142*               Recent Cultures (last 7 days):         Imaging Results Review: I reviewed radiology reports from this admission including: CT  .  Other Study Results Review: EKG was reviewed.     Last 24 Hours Medication List:     Current Facility-Administered Medications:     Acalabrutinib CAPS 1 capsule, BID    acetaminophen (TYLENOL) tablet 975 mg, Q8H MAE    albuterol (PROVENTIL HFA,VENTOLIN HFA) inhaler 2 puff, Q4H PRN    atorvastatin (LIPITOR) tablet 40 mg, Daily With Dinner    [START ON 12/18/2024] buPROPion (WELLBUTRIN XL) 24 hr tablet 150 mg, Daily    Diclofenac Sodium (VOLTAREN) 1 % topical gel 2 g, 4x Daily    docusate sodium (COLACE) capsule 100 mg, BID    heparin (porcine) subcutaneous injection 5,000 Units, Q8H MAE **AND** [COMPLETED] Platelet count, Once    insulin lispro (HumALOG/ADMELOG) 100 units/mL subcutaneous injection 1-5 Units, TID AC **AND** Fingerstick Glucose (POCT), TID AC    insulin lispro (HumALOG/ADMELOG) 100  units/mL subcutaneous injection 1-5 Units, HS    [START ON 12/18/2024] losartan (COZAAR) tablet 25 mg, HS    morphine injection 2 mg, Q6H PRN    naloxone (NARCAN) 0.04 mg/mL syringe 0.04 mg, Q1MIN PRN    oxyCODONE (ROXICODONE) IR tablet 2.5 mg, Q4H PRN **OR** oxyCODONE (ROXICODONE) IR tablet 5 mg, Q4H PRN    senna oral syrup 8.8 mg, Daily    sodium chloride 0.9 % infusion, Continuous, Last Rate: 125 mL/hr (12/16/24 7703)    tamsulosin (FLOMAX) capsule 0.4 mg, Daily    Administrative Statements   Today, Patient Was Seen By: Addie Buck, DO  I have spent a total time of 35 minutes in caring for this patient on the day of the visit/encounter including Diagnostic results, Patient and family education, Risk factor reductions, Counseling / Coordination of care, Documenting in the medical record, Reviewing / ordering tests, medicine, procedures  , and Obtaining or reviewing history  .    **Please Note: This note may have been constructed using a voice recognition system.**

## 2024-12-17 NOTE — ASSESSMENT & PLAN NOTE
POA with hgb 10.8, baseline 13.8  No signs of active bleeding, suspect secondary to CLL  Monitor H&H  Obtain iron panel  Transfuse for hgb less than 7

## 2024-12-17 NOTE — ASSESSMENT & PLAN NOTE
Lab Results   Component Value Date    HGBA1C 6.1 (H) 09/11/2024   Not on current medication, diet controlled  SSI coverage, QID blood sugar checks  Avoid hypoglycemia

## 2024-12-17 NOTE — DISCHARGE SUMMARY
"Discharge Summary - Hospitalist   Name: Mynor Villaseñor 84 y.o. male I MRN: [id number]  Unit/Bed#: -01 I Date of Admission: 12/16/2024   Date of Service: 12/17/2024 I Hospital Day: 0     Assessment & Plan  Impaired ambulation  POA s/p mechanical fall this afternoon, patient tripped outside on the pavement landing on his right hip.  Patient able to ambulate after fall however a couple hours later he is unable to bear weight on the right leg due to the severe shooting pain in right leg and hip area  Denies hitting head, LOC, or dizziness  Ambulates independently at baseline  Assessment of right hip, tenderness noted upon palpitation, no abrasion, edema,  or bruising.  Patient unable to lift rigth leg up or back. Unable to tolerate weightbearing to the right leg  X-ray Right hip: no acute fracture noted, awaiting final read  CT R lower extremities: \"No acute fracture or subluxation of the right hip.Moderate to severe osteoarthritic degenerative changes of the right hip joint.\"  Suspect soft tissue injury right hip possbile ligaments  Plan  Pain management 975 mg Tyelnol q 8 hrs  PRN Oxy 2.5 mg and  5mg q 4 h, and break through IV morphine 2 mg q6 hr  PT consulted  Voltaren cream to affected joint  Fall precautions  Significant improvement in pain will plan to discharge with outpatient PT  Fall  POA s/p post mechanical fall this afternoon patient tripped outside on the pavement landing on his right hip.  Patient able to ambulate after fall however a couple hours later he is unable to bear weight on the right leg due to the severe shooting pain/cramping  Denies hitting head, LOC, or dizziness  Ambulates independently at baseline  CT head No acute intracranial abnormality. Mild chronic small vessel ischemic changes and volume loss.  X-ray Right hip: no acute fracture noted, awaiting final read  CT R lower extremities: \"No acute fracture or subluxation of the right hip.Moderate to severe osteoarthritic degenerative " "changes of the right hip joint.\"  See plan above  Fall precautions    CLL (chronic lymphocytic leukemia) (HCC)  Follow with hemo onc  Received IVIG q 2 months not due for another month  Home medications: Acalbrutinib continue  Obstructive sleep apnea  Noted  Wear CPAP at night    PAD (peripheral artery disease) (HCC)  Follow with Vascular  Home medication ASA and Statin continued  History of atrial fibrillation  remote history of afib thought to be 2/2 prior drug use to treat CLL, not on anticoagulation,    Carotid stenosis  s/p L CEA 12/6/24 at [facility]  Continue ASA and statin  Type 2 diabetes mellitus, without long-term current use of insulin (HCC)  Lab Results   Component Value Date    HGBA1C 6.4 (H) 12/17/2024   Not on current medication, diet controlled  SSI coverage, QID blood sugar checks  Avoid hypoglycemia  (P) [id number]  Leukocytosis  Present admission with elevated WBCs at 17.87  Chest x-ray: No acute findings  UA unremarkable  Suspect reactionary and in the setting of CLL  No indication for antibiotics at this time  Monitor WBCs and fever curve  Anemia  POA with hgb 10.8, baseline 13.8  No signs of active bleeding, suspect secondary to CLL  Monitor H&H  Obtain iron panel  Transfuse for hgb less than 7     Medical Problems       Resolved Problems  Date Reviewed: 9/25/2023   None       Discharging Physician / Practitioner: Addie Buck DO  PCP: Usman Catalan MD  Admission Date:   Admission Orders (From admission, onward)       Ordered        12/17/24 0042  Place in Observation  Once                          Discharge Date: 12/17/24    Consultations During Hospital Stay:  none    Procedures Performed:   none    Significant Findings / Test Results:   none    Incidental Findings:   none     Test Results Pending at Discharge (will require follow up):   none     Outpatient Tests Requested:  none    Complications:  none    Reason for Admission: Ambulatory dysfunction fall    Hospital Course:   Mynor BEE " "Charleen is a 84 y.o. male patient  PMH of CLL, IgG deficiency, DM, HTN, HLD, carotid stenosis who presents with s/p fall onto right hip and now unable to ambulate bear weight to right leg on presentation to the hospital on 12/16/2024.  He states was a mechanical fall denying any headaches, dizziness, loss of consciousness.  CT showed no acute fracture or subluxation of the hip.  There is monitor overnight with minimal pain control medications required.  Please evaluate PT OT ambulated about 200 feet with a cane which he uses at baseline at home.  PT available at his living facility.  Discharge back with ED precautions given.    Please see above list of diagnoses and related plan for additional information.     Condition at Discharge: stable    Discharge Day Visit / Exam:   Subjective: Prior to discharge patient reports significant pulmonary symptoms.  He denies any fevers, chills, nausea, vomiting, diarrhea.  He was able to ambulate with physical therapy without difficulty Using his cane that he is at baseline.Vitals: Blood Pressure: 144/72 (12/17/24 0807)  Pulse: 72 (12/17/24 0807)  Temperature: 97.6 °F (36.4 °C) (12/17/24 0807)  Temp Source: Oral (12/17/24 0807)  Respirations: 16 (12/17/24 0807)  Height: 5' 10\" (177.8 cm) (12/17/24 0138)  Weight - Scale: 76.7 kg (169 lb 1.5 oz) (12/17/24 0138)  SpO2: 96 % (12/17/24 0807)  Physical Exam  Vitals and nursing note reviewed.   Constitutional:       General: He is not in acute distress.     Appearance: Normal appearance. He is well-developed. He is not ill-appearing or toxic-appearing.   HENT:      Head: Normocephalic and atraumatic.   Eyes:      Conjunctiva/sclera: Conjunctivae normal.   Cardiovascular:      Rate and Rhythm: Normal rate and regular rhythm.      Heart sounds: Normal heart sounds. No murmur heard.     No friction rub. No gallop.   Pulmonary:      Effort: Pulmonary effort is normal. No respiratory distress.      Breath sounds: Normal breath sounds. No " wheezing or rales.   Abdominal:      General: There is no distension.      Palpations: Abdomen is soft.      Tenderness: There is no abdominal tenderness. There is no guarding.   Musculoskeletal:      Cervical back: Neck supple.      Right lower leg: No edema.      Left lower leg: No edema.   Skin:     General: Skin is warm and dry.   Neurological:      Mental Status: He is alert and oriented to person, place, and time.   Psychiatric:         Mood and Affect: Mood normal.         Behavior: Behavior normal.        Discussion with Family: Patient declined call to .     Discharge instructions/Information to patient and family:   See after visit summary for information provided to patient and family.      Provisions for Follow-Up Care:  See after visit summary for information related to follow-up care and any pertinent home health orders.      Mobility at time of Discharge:   Basic Mobility Inpatient Raw Score: 15  -HLM Goal: 4: Move to chair/commode  JH-HLM Achieved: 2: Bed activities/Dependent transfer  HLM Goal NOT achieved. Continue to encourage mobility in post discharge setting.     Disposition:   Home    Planned Readmission: no    Discharge Medications:  See after visit summary for reconciled discharge medications provided to patient and/or family.      Administrative Statements   Discharge Statement:  I have spent a total time of 35 minutes in caring for this patient on the day of the visit/encounter. >30 minutes of time was spent on: Diagnostic results, Risks and benefits of tx options, Patient and family education, Importance of tx compliance, Risk factor reductions, Impressions, Counseling / Coordination of care, Documenting in the medical record, and Reviewing / ordering tests, medicine, procedures  .    **Please Note: This note may have been constructed using a voice recognition system**

## 2024-12-17 NOTE — ASSESSMENT & PLAN NOTE
Present admission with elevated WBCs at 17.87  Chest x-ray: No acute findings  UA unremarkable  Suspect reactionary and in the setting of CLL  No indication for antibiotics at this time  Monitor WBCs and fever curve   0

## 2024-12-17 NOTE — OCCUPATIONAL THERAPY NOTE
Occupational Therapy Evaluation     Patient Name: Mynor Villaseñor  Today's Date: 12/17/2024  Problem List  Principal Problem:    Impaired ambulation  Active Problems:    Obstructive sleep apnea    CLL (chronic lymphocytic leukemia) (HCC)    PAD (peripheral artery disease) (HCC)    History of atrial fibrillation    Fall    Carotid stenosis    Type 2 diabetes mellitus, without long-term current use of insulin (HCC)    Leukocytosis    Anemia    Past Medical History  Past Medical History:   Diagnosis Date    Arthritis     BPH (benign prostatic hyperplasia)     Cancer (HCC)     Chronic lymphocytic leukemia (CLL), B-cell (HCC)     CPAP (continuous positive airway pressure) dependence     Depression     Hearing aid worn     bilateral    Hyperlipidemia     controlled    Hypertension     controlled    Sinusitis     Sleep apnea     CPAP    Tinnitus     Wears glasses      Past Surgical History  Past Surgical History:   Procedure Laterality Date    BACK SURGERY  09/2017    laminotomy L3,4 S1-    CARPAL TUNNEL RELEASE Bilateral     CATARACT EXTRACTION      left    CATARACT EXTRACTION W/ INTRAOCULAR LENS IMPLANT Left 12/11/2017    Procedure: EXTRACTION EXTRACAPSULAR CATARACT PHACO INTRAOCULAR LENS (IOL);  Surgeon: Blade Quintana MD;  Location: [facility] MAIN OR;  Service: Ophthalmology    CERVICAL DISCECTOMY  1982    C5-6    COLONOSCOPY      HERNIA REPAIR      l inguinal    IR LOWER EXTREMITY / INTERVENTION  12/2/2019    AZ EXCISION MALIGNANT LESION F/E/E/N/L 1.1-2.0 CM Right 1/11/2022    Procedure: EXCISION SKIN LESION CHEEK WITH FROZEN SECTION;  Surgeon: Yvon Pablo MD;  Location: [facility] MAIN OR;  Service: Plastics    AZ XCAPSL CTRC RMVL INSJ IO LENS PROSTH W/O ECP Right 11/14/2022    Procedure: EXTRACTION EXTRACAPSULAR CATARACT PHACO INTRAOCULAR LENS (IOL);  Surgeon: Blade Quintana MD;  Location: [facility] MAIN OR;  Service: Ophthalmology    ROTATOR CUFF REPAIR Left 2006    TONSILLECTOMY             12/17/24 1255   OT Last Visit  "  OT Visit Date 12/17/24   Note Type   Note type Evaluation   Additional Comments Pt is an 85 y/o M  s/p fall and subsequent R hip pain and Impaired ambulation. X-ray Right hip: no acute fracture noted. CT R lower extremities: No acute fracture or subluxation of the right hip. 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   Pain Assessment   Pain Assessment Tool 0-10   Pain Score 4   Pain Location/Orientation Orientation: Right;Location: Hip   Restrictions/Precautions   Weight Bearing Precautions Per Order No   Other Precautions Fall Risk;Pain;Hard of hearing   Home Living   Type of Home Other (Comment)  (formerly [facility] independent living facility)   Home Layout One level;Elevator  (0 ELIS)   Bathroom Shower/Tub Walk-in shower   Bathroom Toilet Standard   Bathroom Accessibility Accessible   Home Equipment Cane  (Pt reports that he was using at baseline)   Prior Function   Level of [place] Independent with ADLs;Independent with functional mobility;Independent with IADLS   Lives With Spouse   IADLs Independent with driving;Independent with medication management   Falls in the last 6 months 1 to 4  (Pt sustained a fall leading this admission. Reports that his \"feet got tangled\" when managing the 3 steps to enter his [employer])   Vocational Retired  (physician)   Subjective   Subjective \" I feel close to how I was moving before\"   ADL   Eating Assistance 7  Independent   Grooming Assistance 7  Independent   UB Bathing Assistance 6  Modified Independent   LB Bathing Assistance 6  Modified Independent   UB Dressing Assistance 6  Modified independent   LB Dressing Assistance 6  Modified independent   Toileting Assistance  6  Modified independent   Bed Mobility   Additional Comments Pt received seated EOB   Transfers   Sit to Stand 6  Modified independent   Stand to Sit 6  Modified independent   Functional Mobility   Functional Mobility 5  Supervision   Additional Comments cane "   Balance   Static Sitting Good   Dynamic Sitting Fair +   Static Standing Fair +   Dynamic Standing Fair   Activity Tolerance   Activity Tolerance Patient limited by pain   Medical Staff Made Aware Dr. Heber RAMIREZ Assessment   RUE Assessment WFL   LUE Assessment   LUE Assessment WFL   Cognition   Overall Cognitive Status WFL   Arousal/Participation Alert   Attention Within functional limits   Orientation Level Oriented X4   Memory Within functional limits   Following Commands Follows multistep commands without difficulty   Assessment   Assessment Pt is a 84 y.o. male seen for OT evaluation at [facility], admitted 12/16/2024 s/p fall and subsequent R hip pain and Impaired ambulation. X-ray Right hip: no acute fracture noted. CT R lower extremities: No acute fracture or subluxation of the right hip. 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 Comorbidities affecting pt's functional performance at time of assessment include: CLL, hx of A-fib, recent L CEA 12/6/24 at [facility], type II DM, etc (see chart)  .  Prior to admission, pt was living with wife in an independent living facility.  Pt was I w/  ADLS and IADLS, (+) drove, & required cane PTA. Upon evaluation, pt appears to be performing below baseline functional status. Pt currently requires sup-mod I for functional mobility/transfers, mod I for UB ADLs and mod I for LB ADLS 2* the following deficits impacting occupational performance: decreased balance and increased pain. Full objective findings from OT assessment regarding body systems outlined above. Personal factors affecting pt at time of IE include: decreased functional mobility . These impairments, as well as risk for falls  limit pt's ability to safely engage in all baseline areas of occupation and mobility. Pt to benefit from continued skilled OT tx while in the hospital to address deficits as defined above and maximize level of functional independence w  ADL's and functional mobility. Occupational Performance areas to address include: functional mobility and community mobility.  This evaluation required an extensive review of medical and/or therapy records and additional review of physical, cognitive and psychosocial history related to functional performance. Based upon functional performance deficits and assessments, this evaluation has been identified as a high complexity evaluation.  At this time, OT recommendations at time of discharge are home with no OT needs. Pt may benefit from level 3 PT services.   Goals   Patient Goals Pt wishes to get home   Plan   OT Frequency Eval only   Discharge Recommendation   Rehab Resource Intensity Level, OT No post-acute rehabilitation needs  (Pt may benefit from level 3 PT services.)   Additional Comments  The patient's raw score on the AM-PAC Daily Activity inpatient short form is 24, standardized score is 57.54, greater than 39.4. Patients at this level are likely to benefit from DC to home. However please refer to therapist recommendation for discharge planning given other factors that may influence destination.   AM-PAC Daily Activity Inpatient   Lower Body Dressing 4   Bathing 4   Toileting 4   Upper Body Dressing 4   Grooming 4   Eating 4   Daily Activity Raw Score 24   Daily Activity Standardized Score (Calc for Raw Score >=11) 57.54   AM-PAC Applied Cognition Inpatient   Following a Speech/Presentation 4   Understanding Ordinary Conversation 4   Taking Medications 4   Remembering Where Things Are Placed or Put Away 4   Remembering List of 4-5 Errands 4   Taking Care of Complicated Tasks 4   Applied Cognition Raw Score 24   Applied Cognition Standardized Score 62.21   End of Consult   Education Provided Yes   Patient Position at End of Consult Bedside chair;All needs within reach   Nurse Communication Nurse aware of consult           DEL Maritnez/KORY

## 2024-12-17 NOTE — CASE MANAGEMENT
Case Management Assessment & Discharge Planning Note    Patient name Mynor Villaseñor  Location /-01 MRN [id number]  : 1940 Date 2024       Current Admission Date: 2024  Current Admission Diagnosis:Impaired ambulation   Patient Active Problem List    Diagnosis Date Noted Date Diagnosed    Fall 2024     Impaired ambulation 2024     Carotid stenosis 2024     Type 2 diabetes mellitus, without long-term current use of insulin ([facility]) 2024     Leukocytosis 2024     Anemia 2024     Dyspnea on exertion 10/21/2023     Aortic ectasia ([facility]) 10/21/2023     History of colon polyps 2023     CPAP (continuous positive airway pressure) dependence 2022     Other gastritis with bleeding 2022     Gastric polyp 2022     Hematemesis 2022     History of atrial fibrillation 2021     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     Hyperlipidemia 2019     PAD (peripheral artery disease) ([facility]) 2019     Hypertension 2019     Obstructive sleep apnea 2019     CLL (chronic lymphocytic leukemia) ([facility]) 2019     Mild intermittent asthma without complication 2019       LOS (days): 0  Geometric Mean LOS (GMLOS) (days):   Days to GMLOS:     OBJECTIVE:     Current admission status: Observation     Preferred Pharmacy:   [id number] - [address] [address]  Phone: [phone] Fax: [phone]    Primary Care Provider: Usman Catalan MD    Primary Insurance: MEDICARE  Secondary Insurance: AARP    ASSESSMENT:  Active Health Care Proxies    There are no active Health Care Proxies on file.    Advance Directives  Does patient have a Health Care POA?: Yes  Does patient have Advance Directives?: Yes  Primary Contact: Felipa Villaseñor (spouse)    Obs Notice Signed: 24    Readmission Root Cause  30 Day Readmission: No    Patient  Information  Admitted from:: Home  Mental Status: Alert  During Assessment patient was accompanied by: Not accompanied during assessment  Assessment information provided by:: Patient  Primary Caregiver: Self  Support Systems: Spouse/significant other, Family members  County of Residence: [place]  What city do you live in?: [place]  Home entry access options. Select all that apply.: Elevator  Type of Current Residence: Apartment ([facility])  Floor Level: 3  Upon entering residence, is there a bedroom on the main floor (no further steps)?: Yes  Upon entering residence, is there a bathroom on the main floor (no further steps)?: Yes  Living Arrangements: Lives w/ Spouse/significant other    Activities of Daily Living Prior to Admission  Functional Status: Independent  Completes ADLs independently?: Yes  Ambulates independently?: Yes  Does patient use assisted devices?: Yes  Assisted Devices (DME) used: Straight Cane  Does patient currently own DME?: Yes  What DME does the patient currently own?: Straight Cane  Does patient have a history of Outpatient Therapy (PT/OT)?: Yes  Does the patient have a history of Short-Term Rehab?: No  Does patient have a history of HHC?: No  Does patient currently have HHC?: No    Patient Information Continued  Income Source: Pension/senior living  Does patient have prescription coverage?: Yes  Does patient receive dialysis treatments?: No  Does patient have a history of substance abuse?: No  Does patient have a history of Mental Health Diagnosis?: No    Means of Transportation  Means of Transport to Appts:: Drives Self    DISCHARGE DETAILS:    Discharge planning discussed with:: Patient  Freedom of Choice: Yes     CM contacted family/caregiver?: No- see comments (Patient declined)  Were Treatment Team discharge recommendations reviewed with patient/caregiver?: Yes  Did patient/caregiver verbalize understanding of patient care needs?: Yes  Were  patient/caregiver advised of the risks associated with not following Treatment Team discharge recommendations?: Yes    Contacts  Patient Contacts: Felipa Villaseñor (wife)  Relationship to Patient:: Family  Contact Method: Phone  Phone Number: [phone]  Reason/Outcome: Emergency Contact, Discharge Planning    Requested Home Health Care         Is the patient interested in HHC at discharge?: No    DME Referral Provided  Referral made for DME?: No    Other Referral/Resources/Interventions Provided:  Interventions: None Indicated    Would you like to participate in our [facility] service program?  : No - Declined    Treatment Team Recommendation: Home  Discharge Destination Plan:: Home  Transport at Discharge : Family    CM met with the patient bedside. The patient reports living in [facility] with his wife. The patient denied having unmet needs at home/in community. The patient reports feeling better today and is aware of PT eval pending. CM will continue to follow the patient through discharge.

## 2024-12-17 NOTE — ASSESSMENT & PLAN NOTE
Follow with hemo onc  Received IVIG q 2 months not due for another month  Home medications: Acalbrutinib continue

## 2024-12-17 NOTE — NURSING NOTE
Pt dc at this time with all belongings, AVS reviewed with patient and wife and all questions answered. Left via wc, transport by wife to [facility]

## 2024-12-17 NOTE — ASSESSMENT & PLAN NOTE
Present admission with elevated WBCs at 17.87  Chest x-ray: No acute findings  UA unremarkable  Suspect reactionary and in the setting of CLL  No indication for antibiotics at this time  Monitor WBCs and fever curve

## 2024-12-17 NOTE — DISCHARGE INSTR - AVS FIRST PAGE
Please follow-up PCP 1 week    You can use Voltaren gel and Tylenol for the pain in your hip.    Please participate in outpatient PT

## 2024-12-17 NOTE — ED PROVIDER NOTES
Time reflects when diagnosis was documented in both MDM as applicable and the Disposition within this note       Time User Action Codes Description Comment    12/17/2024 12:40 AM Chen Lemus Add [W19.XXXA] Fall, initial encounter     12/17/2024 12:41 AM Chen Lemus Add [R26.2] Inability to ambulate due to right hip           ED Disposition       ED Disposition   Admit    Condition   Stable    Date/Time   Tue Dec 17, 2024 12:41 AM    Comment   Case was discussed with SWATI and the patient's admission status was agreed to be Admission Status: inpatient status to the service of Dr. Huizar               Assessment & Plan       Medical Decision Making  This is 84 years old came for having fall at 3 PM.  Patient was going to cut his head and he tripped on a step and fell on the concrete on his right hip.  Later on patient was able to stand and walk few steps but the pain increased so he cannot walk anymore.  Patient denies hitting his head and he takes no blood thinners.  Patient denies any headache, dizziness, LOC.  Patient has history of hypertension, BPH, hypercholesterolemia and he has recent left carotid endarterectomy.  Physical exam significant for; Has tenderness and the lateral aspect of the right hip no apparent deformity no shortening of the R lower extremity, no external rotation no tenderness over the pelvic BONE.  Has good femoral pulse sensation intact neurovascular intact.  EKG shows NSR rate 92 nonspecific ST abnormalities no ischemic changes.  Reviewing the labs CBC significant for WBC 17.8, H/H10.8/34.5, CMP significant for glucose 145.  CXR shows no acute cardiopulmonary findings.  X-ray pelvis and right hip; shows no fracture but there is degenerative changes.  CT head; no acute intracranial findings.  Ct R lower extremities; No acute fracture or subluxation of the right hip.   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  Pt is unable to ambulate . Will  admit and get PT,OT in the [facility]              Amount and/or Complexity of Data Reviewed  Labs: ordered.     Details: Reviewing the labs CBC significant for WBC 17.8, H/H10.8/34.5, CMP significant for glucose 145.    Radiology: ordered and independent interpretation performed.     Details: CXR shows no acute cardiopulmonary findings.  X-ray pelvis and right hip; shows no fracture but there is degenerative changes.    CT head; no acute intracranial findings.  Ct R lower extremities; No acute fracture or subluxation of the right hip.   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    ECG/medicine tests: ordered and independent interpretation performed.     Details: EKG shows NSR rate 92 nonspecific ST abnormalities no ischemic changes.      Risk  Prescription drug management.  Decision regarding hospitalization.             Medications   morphine injection 2 mg (2 mg Intravenous Given 12/16/24 2150)       ED Risk Strat Scores                          SBIRT 20yo+      Flowsheet Row Most Recent Value   Initial Alcohol Screen: US AUDIT-C     1. How often do you have a drink containing alcohol? 0 Filed at: 12/16/2024 2142   2. How many drinks containing alcohol do you have on a typical day you are drinking?  0 Filed at: 12/16/2024 2142   3a. Male UNDER 65: How often do you have five or more drinks on one occasion? 0 Filed at: 12/16/2024 2142   3b. FEMALE Any Age, or MALE 65+: How often do you have 4 or more drinks on one occassion? 0 Filed at: 12/16/2024 2142   Audit-C Score 0 Filed at: 12/16/2024 2142   JOSE: How many times in the past year have you...    Used an illegal drug or used a prescription medication for non-medical reasons? Never Filed at: 12/16/2024 2142                            History of Present Illness       Chief Complaint   Patient presents with    Hip Pain     Pt rep[orts he lost his balance around 3 pm, and he fell onto his right hip. Pt reports he was able to walk  initially, but pain is increasing        Past Medical History:   Diagnosis Date    Arthritis     BPH (benign prostatic hyperplasia)     Cancer (HCC)     Chronic lymphocytic leukemia (CLL), B-cell (HCC)     CPAP (continuous positive airway pressure) dependence     Depression     Hearing aid worn     bilateral    Hyperlipidemia     controlled    Hypertension     controlled    Sinusitis     Sleep apnea     CPAP    Tinnitus     Wears glasses       Past Surgical History:   Procedure Laterality Date    BACK SURGERY  2017    laminotomy L3,4 S1-    CARPAL TUNNEL RELEASE Bilateral     CATARACT EXTRACTION      left    CATARACT EXTRACTION W/ INTRAOCULAR LENS IMPLANT Left 2017    Procedure: EXTRACTION EXTRACAPSULAR CATARACT PHACO INTRAOCULAR LENS (IOL);  Surgeon: Blade Quintana MD;  Location: [facility] MAIN OR;  Service: Ophthalmology    CERVICAL DISCECTOMY  1982    C5-6    COLONOSCOPY      HERNIA REPAIR      l inguinal    IR LOWER EXTREMITY / INTERVENTION  2019    OK EXCISION MALIGNANT LESION F/E/E/N/L 1.1-2.0 CM Right 2022    Procedure: EXCISION SKIN LESION CHEEK WITH FROZEN SECTION;  Surgeon: Yvon Pablo MD;  Location: [facility] MAIN OR;  Service: Plastics    OK XCAPSL CTRC RMVL INSJ IO LENS PROSTH W/O ECP Right 2022    Procedure: EXTRACTION EXTRACAPSULAR CATARACT PHACO INTRAOCULAR LENS (IOL);  Surgeon: Blade Quintana MD;  Location: [facility] MAIN OR;  Service: Ophthalmology    ROTATOR CUFF REPAIR Left     TONSILLECTOMY        Family History   Problem Relation Age of Onset    Cancer Mother         bladder    Heart disease Father     Stroke Father     Parkinsonism Brother       Social History     Tobacco Use    Smoking status: Former     Current packs/day: 0.00     Average packs/day: 1 pack/day for 20.0 years (20.0 ttl pk-yrs)     Types: Cigarettes     Start date:      Quit date:      Years since quittin.9    Smokeless tobacco: Never   Vaping Use    Vaping status: Never Used   Substance Use  Topics    Alcohol use: Yes     Alcohol/week: 7.0 standard drinks of alcohol     Types: 7 Glasses of wine per week     Comment: occ    Drug use: No      E-Cigarette/Vaping    E-Cigarette Use Never User       E-Cigarette/Vaping Substances    Nicotine No     THC No     CBD No     Flavoring No     Other No     Unknown No       I have reviewed and agree with the history as documented.     This is an 84 years old came for having fall.  Patient is stated that he was going to cut his hair and he missed the step and fell him his right side of the body.  Patient has pain at the right hip and he was able to stand and walk few steps but later on the pain increased and, unable to walk.  Patient denies hitting his head.  Patient denies LOC, dizziness, headache.  Patient takes no blood thinners.  Patient has history of hypertension hypercholesterolemia BPH.  Patient has recent left carotid endarterectomy.  Patient denies CP, SOB, abdominal pain, nausea vomiting.  Patient is able to flex his hip with pain.      History provided by:  Patient   used: No    Hip Pain  Location:  R hip  Severity:  Moderate  Onset quality:  Sudden  Timing:  Constant  Progression:  Worsening  Associated symptoms: myalgias    Associated symptoms: no abdominal pain, no chest pain, no congestion, no cough, no ear pain, no fever, no headaches, no loss of consciousness, no nausea, no rash, no shortness of breath, no sore throat and no vomiting        Review of Systems   Constitutional:  Negative for chills and fever.   HENT:  Negative for congestion, ear pain and sore throat.    Eyes:  Negative for pain and visual disturbance.   Respiratory:  Negative for cough and shortness of breath.    Cardiovascular:  Negative for chest pain and palpitations.   Gastrointestinal:  Negative for abdominal pain, nausea and vomiting.   Genitourinary:  Negative for dysuria and hematuria.   Musculoskeletal:  Positive for gait problem and myalgias. Negative for  arthralgias, back pain, joint swelling, neck pain and neck stiffness.   Skin:  Negative for color change and rash.   Neurological:  Negative for seizures, loss of consciousness, syncope and headaches.   All other systems reviewed and are negative.          Objective       ED Triage Vitals   Temperature Pulse Blood Pressure Respirations SpO2 Patient Position - Orthostatic VS   12/16/24 2144 12/16/24 2143 12/16/24 2143 12/16/24 2143 12/16/24 2143 12/16/24 2143   99.2 °F (37.3 °C) 96 168/72 18 97 % Lying      Temp Source Heart Rate Source BP Location FiO2 (%) Pain Score    12/16/24 2144 12/16/24 2143 12/16/24 2143 -- 12/16/24 2150    Oral Monitor Left arm  4      Vitals      Date and Time Temp Pulse SpO2 Resp BP Pain Score FACES Pain Rating User   12/17/24 1255 -- -- -- -- -- 4 -- NB   12/17/24 1156 -- -- -- -- -- 3 --    12/17/24 0807 97.6 °F (36.4 °C) 72 96 % 16 144/72 -- -- JI   12/17/24 0755 -- -- -- -- -- No Pain --    12/17/24 0213 -- -- -- -- -- 10 - Worst Possible Pain -- HK   12/17/24 0138 98.9 °F (37.2 °C) 96 92 % 21 157/78 -- -- IR   12/16/24 2150 -- -- -- -- -- 4 -- JK   12/16/24 2144 99.2 °F (37.3 °C) -- -- -- -- -- -- JK   12/16/24 2143 -- 96 97 % 18 168/72 -- -- JK            Physical Exam  Vitals and nursing note reviewed.   Constitutional:       General: He is not in acute distress.     Appearance: He is well-developed. He is not ill-appearing, toxic-appearing or diaphoretic.   HENT:      Head: Normocephalic and atraumatic.      Right Ear: Ear canal normal.      Left Ear: Ear canal normal.      Nose: Nose normal. No congestion or rhinorrhea.      Mouth/Throat:      Mouth: Mucous membranes are moist.      Pharynx: No oropharyngeal exudate or posterior oropharyngeal erythema.   Eyes:      General:         Right eye: No discharge.         Left eye: No discharge.      Extraocular Movements: Extraocular movements intact.      Conjunctiva/sclera: Conjunctivae normal.      Pupils: Pupils are equal, round,  and reactive to light.   Neck:      Vascular: No carotid bruit.   Cardiovascular:      Rate and Rhythm: Normal rate and regular rhythm.      Heart sounds: No murmur heard.     No friction rub. No gallop.   Pulmonary:      Effort: Pulmonary effort is normal. No respiratory distress.      Breath sounds: Normal breath sounds. No wheezing, rhonchi or rales.   Chest:      Chest wall: No tenderness.   Abdominal:      General: Abdomen is flat. There is no distension.      Palpations: Abdomen is soft. There is no mass.      Tenderness: There is no abdominal tenderness. There is no right CVA tenderness, left CVA tenderness, guarding or rebound.      Hernia: No hernia is present.   Musculoskeletal:         General: Tenderness present. No swelling, deformity or signs of injury.      Cervical back: Normal range of motion and neck supple. No rigidity or tenderness.      Right lower leg: No edema.      Left lower leg: No edema.      Comments: Has tenderness and the lateral aspect of the right hip no apparent deformity no shortening of the R lower extremity, no external rotation no tenderness over the pelvic BONE.  Has good femoral pulse sensation intact neurovascular intact.   Lymphadenopathy:      Cervical: No cervical adenopathy.   Skin:     General: Skin is warm and dry.      Capillary Refill: Capillary refill takes less than 2 seconds.      Coloration: Skin is not jaundiced or pale.      Findings: No bruising, erythema, lesion or rash.   Neurological:      Mental Status: He is alert and oriented to person, place, and time.   Psychiatric:         Mood and Affect: Mood normal.         Results Reviewed       Procedure Component Value Units Date/Time    Hemoglobin A1c w/EAG Estimation (Prechecked if no A1C within 90 days) [[id number]]  (Abnormal) Collected: 12/17/24 0444    Lab Status: Final result Specimen: Blood from Arm, Left Updated: 12/17/24 1058     Hemoglobin A1C 6.4 %       mg/dl     Basic metabolic panel  [[id number]] Collected: 12/17/24 0444    Lab Status: Final result Specimen: Blood from Arm, Left Updated: 12/17/24 0802     Sodium 136 mmol/L      Potassium 3.9 mmol/L      Chloride 99 mmol/L      CO2 28 mmol/L      ANION GAP 9 mmol/L      BUN 13 mg/dL      Creatinine 0.79 mg/dL      Glucose 137 mg/dL      Calcium 8.6 mg/dL      eGFR 83 ml/min/1.73sq m     Narrative:      National Kidney Disease Foundation guidelines for Chronic Kidney Disease (CKD):     Stage 1 with normal or high GFR (GFR > 90 mL/min/1.73 square meters)    Stage 2 Mild CKD (GFR = 60-89 mL/min/1.73 square meters)    Stage 3A Moderate CKD (GFR = 45-59 mL/min/1.73 square meters)    Stage 3B Moderate CKD (GFR = 30-44 mL/min/1.73 square meters)    Stage 4 Severe CKD (GFR = 15-29 mL/min/1.73 square meters)    Stage 5 End Stage CKD (GFR <15 mL/min/1.73 square meters)  Note: GFR calculation is accurate only with a steady state creatinine    Platelet count [[id number]]  (Normal) Collected: 12/17/24 0444    Lab Status: Final result Specimen: Blood from Arm, Left Updated: 12/17/24 0633     Platelets 205 Thousands/uL      MPV 11.9 fL     CBC and differential [[id number]]  (Abnormal) Collected: 12/17/24 0444    Lab Status: Final result Specimen: Blood from Arm, Left Updated: 12/17/24 0633     WBC 15.98 Thousand/uL      RBC 3.30 Million/uL      Hemoglobin 9.7 g/dL      Hematocrit 30.5 %      MCV 92 fL      MCH 29.4 pg      MCHC 31.8 g/dL      RDW 13.5 %      MPV 11.7 fL      Platelets 209 Thousands/uL      nRBC 0 /100 WBCs      Segmented % 64 %      Immature Grans % 0 %      Lymphocytes % 24 %      Monocytes % 11 %      Eosinophils Relative 1 %      Basophils Relative 0 %      Absolute Neutrophils 10.09 Thousands/µL      Absolute Immature Grans 0.05 Thousand/uL      Absolute Lymphocytes 3.78 Thousands/µL      Absolute Monocytes 1.80 Thousand/µL      Eosinophils Absolute 0.21 Thousand/µL      Basophils Absolute 0.05 Thousands/µL     UA w Reflex to Microscopic w  Reflex to Culture [[id number]]  (Abnormal) Collected: 12/17/24 0028    Lab Status: Final result Specimen: Urine, Clean Catch Updated: 12/17/24 0033     Color, UA Yellow     Clarity, UA Clear     Specific Gravity, UA 1.025     pH, UA 6.0     Leukocytes, UA Negative     Nitrite, UA Negative     Protein, UA Negative mg/dl      Glucose, UA Trace mg/dl      Ketones, UA Negative mg/dl      Urobilinogen, UA 0.2 E.U./dl      Bilirubin, UA Negative     Occult Blood, UA Negative    Comprehensive metabolic panel [[id number]]  (Abnormal) Collected: 12/16/24 2148    Lab Status: Final result Specimen: Blood from Arm, Right Updated: 12/16/24 2209     Sodium 136 mmol/L      Potassium 4.3 mmol/L      Chloride 99 mmol/L      CO2 29 mmol/L      ANION GAP 8 mmol/L      BUN 17 mg/dL      Creatinine 0.88 mg/dL      Glucose 145 mg/dL      Calcium 9.2 mg/dL      AST 16 U/L      ALT 22 U/L      Alkaline Phosphatase 67 U/L      Total Protein 6.5 g/dL      Albumin 4.0 g/dL      Total Bilirubin 0.55 mg/dL      eGFR 79 ml/min/1.73sq m     Narrative:      National Kidney Disease Foundation guidelines for Chronic Kidney Disease (CKD):     Stage 1 with normal or high GFR (GFR > 90 mL/min/1.73 square meters)    Stage 2 Mild CKD (GFR = 60-89 mL/min/1.73 square meters)    Stage 3A Moderate CKD (GFR = 45-59 mL/min/1.73 square meters)    Stage 3B Moderate CKD (GFR = 30-44 mL/min/1.73 square meters)    Stage 4 Severe CKD (GFR = 15-29 mL/min/1.73 square meters)    Stage 5 End Stage CKD (GFR <15 mL/min/1.73 square meters)  Note: GFR calculation is accurate only with a steady state creatinine    CBC and differential [[id number]]  (Abnormal) Collected: 12/16/24 2149    Lab Status: Final result Specimen: Blood from Arm, Right Updated: 12/16/24 2153     WBC 17.87 Thousand/uL      RBC 3.70 Million/uL      Hemoglobin 10.8 g/dL      Hematocrit 34.5 %      MCV 93 fL      MCH 29.2 pg      MCHC 31.3 g/dL      RDW 13.5 %      MPV 10.9 fL      Platelets 222 Thousands/uL       nRBC 0 /100 WBCs      Segmented % 60 %      Immature Grans % 1 %      Lymphocytes % 27 %      Monocytes % 11 %      Eosinophils Relative 1 %      Basophils Relative 0 %      Absolute Neutrophils 10.85 Thousands/µL      Absolute Immature Grans 0.09 Thousand/uL      Absolute Lymphocytes 4.76 Thousands/µL      Absolute Monocytes 1.87 Thousand/µL      Eosinophils Absolute 0.24 Thousand/µL      Basophils Absolute 0.06 Thousands/µL             CT lower extremity wo contrast right   Final Interpretation by Ankit Guzman MD (12/16 0859)      No acute fracture or subluxation of the right hip.      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         Workstation performed: [id number]         CT head wo contrast   Final Interpretation by Ankit Guzman MD (12/16 0001)      No acute intracranial abnormality.      Mild chronic small vessel ischemic changes and volume loss.            Workstation performed: [id number]         XR hip/pelv 2-3 vws right   ED Interpretation by Chen Lemus MD (12/17 0038)   XR R HIP AND PELVIS; NO FRACTURE, degenerative changes.       Final Interpretation by Cade Cueva MD (12/17 2924)      No acute osseous abnormality.         Computerized Assisted Algorithm (CAA) may have been used to analyze all applicable images.            Workstation performed: [id number]         XR chest 1 view portable   ED Interpretation by Chen Lemus MD (12/17 0038)   CXR; NO acute cardiopulmonary findings      Final Interpretation by Ciara Steward MD (12/17 0613)      No acute cardiopulmonary disease.      No acute displaced fractures.      Workstation performed: [id number]             ECG 12 Lead Documentation Only    Date/Time: 12/16/2024 10:56 PM    Performed by: Chen Lemus MD  Authorized by: Chen Lemus MD    Indications / Diagnosis:  FALL  ECG reviewed by me, the ED Provider: yes    Patient location:  ED and  bedside  Previous ECG:     Previous ECG:  Compared to current    Similarity:  No change  Interpretation:     Interpretation: abnormal    Rate:     ECG rate:  92    ECG rate assessment: normal    Rhythm:     Rhythm: sinus rhythm    QRS:     QRS axis:  Normal    QRS intervals:  Normal  Conduction:     Conduction: normal    ST segments:     ST segments:  Non-specific  T waves:     T waves: normal        ED Medication and Procedure Management   Prior to Admission Medications   Prescriptions Last Dose Informant Patient Reported? Taking?   Acalabrutinib 100 MG CAPS 12/16/2024 Self Yes Yes   Sig: Take 1 capsule by mouth 2 (two) times a day   Arnuity Ellipta 100 MCG/ACT AEPB inhaler Not Taking Self No No   Sig: Inhale 1 puff daily Rinse mouth after use.   Patient not taking: Reported on 12/17/2024   Calquence 100 MG TABS Not Taking Self Yes No   Patient not taking: Reported on 12/17/2024   Immune Globulin, Human, (GAMUNEX-C IJ) Past Month Self Yes Yes   Multiple Vitamins-Minerals (PRESERVISION AREDS 2 PO) 12/16/2024 Self Yes Yes   Sig: Take by mouth 2 (two) times a day   albuterol (ProAir HFA) 90 mcg/act inhaler More than a month Self No No   Sig: Inhale 2 puffs every 4 (four) hours as needed for wheezing   buPROPion (WELLBUTRIN SR) 150 mg 12 hr tablet 12/16/2024 Self Yes Yes   fluticasone (Flovent HFA) 220 mcg/act inhaler Not Taking Self No No   Sig: Inhale 1 puff once daily Rinse mouth after use.   Patient not taking: Reported on 12/17/2024   immune globulin, human (Bivigam) 5 GM/50ML Past Month Self Yes Yes   Sig: 15mg IV every 8 weeks   losartan (COZAAR) 25 mg tablet 12/16/2024 Self Yes Yes   Sig: Take 25 mg by mouth daily at bedtime   rosuvastatin (CRESTOR) 20 MG tablet 12/16/2024 Self Yes Yes   tadalafil (CIALIS) 5 MG tablet Not Taking Self Yes No   Sig: daily at bedtime   Patient not taking: Reported on 12/17/2024   tamsulosin (FLOMAX) 0.4 mg 12/16/2024 Self Yes Yes   Sig: Take 0.4 mg by mouth in the morning       Facility-Administered Medications: None     Discharge Medication List as of 12/17/2024  1:20 PM        START taking these medications    Details   acetaminophen (TYLENOL) 325 mg tablet Take 3 tablets (975 mg total) by mouth every 8 (eight) hours, Starting Tue 12/17/2024, Normal      Diclofenac Sodium (VOLTAREN) 1 % Apply 2 g topically 4 (four) times a day, Starting Tue 12/17/2024, Normal           CONTINUE these medications which have NOT CHANGED    Details   Acalabrutinib 100 MG CAPS Take 1 capsule by mouth 2 (two) times a day, Historical Med      buPROPion (WELLBUTRIN SR) 150 mg 12 hr tablet Starting Tue 9/5/2023, Historical Med      immune globulin, human (Bivigam) 5 GM/50ML 15mg IV every 8 weeks, Historical Med      Immune Globulin, Human, (GAMUNEX-C IJ) Historical Med      losartan (COZAAR) 25 mg tablet Take 25 mg by mouth daily at bedtime, Starting Tue 8/22/2023, Historical Med      Multiple Vitamins-Minerals (PRESERVISION AREDS 2 PO) Take by mouth 2 (two) times a day, Historical Med      rosuvastatin (CRESTOR) 20 MG tablet Starting Mon 4/18/2022, Historical Med      tamsulosin (FLOMAX) 0.4 mg Take 0.4 mg by mouth in the morning, Starting Thu 7/20/2023, Historical Med      albuterol (ProAir HFA) 90 mcg/act inhaler Inhale 2 puffs every 4 (four) hours as needed for wheezing, Starting Tue 6/7/2022, Normal      Arnuity Ellipta 100 MCG/ACT AEPB inhaler Inhale 1 puff daily Rinse mouth after use., Starting Tue 6/7/2022, Until Mon 11/14/2022, Normal      Calquence 100 MG TABS Starting Mon 4/24/2023, Historical Med      fluticasone (Flovent HFA) 220 mcg/act inhaler Inhale 1 puff once daily Rinse mouth after use., Starting Mon 9/25/2023, Normal      tadalafil (CIALIS) 5 MG tablet daily at bedtime, Starting Fri 4/5/2019, Historical Med             ED SEPSIS DOCUMENTATION   Time reflects when diagnosis was documented in both MDM as applicable and the Disposition within this note       Time User Action Codes Description  Comment    12/17/2024 12:40 AM Chen Lemus Add [W19.XXXA] Fall, initial encounter     12/17/2024 12:41 AM Chen Lemus [R26.2] Inability to ambulate due to right hip                  Chen Lemus MD  12/18/24 0910

## 2024-12-17 NOTE — PLAN OF CARE
Problem: Potential for Falls  Goal: Patient will remain free of falls  Description: INTERVENTIONS:  - Educate patient/family on patient safety including physical limitations  - Instruct patient to call for assistance with activity   - Consult OT/PT to assist with strengthening/mobility   - Keep Call bell within reach  - Keep bed low and locked with side rails adjusted as appropriate  - Keep care items and personal belongings within reach  - Initiate and maintain comfort rounds  - Make Fall Risk Sign visible to staff  - Offer Toileting every prn Hours, in advance of need  - Initiate/Maintain pt refuses it alarm  - Obtain necessary fall risk management equipment: n/a  - Apply yellow socks and bracelet for high fall risk patients  - Consider moving patient to room near nurses station  Outcome: Not Progressing     Problem: PAIN - ADULT  Goal: Verbalizes/displays adequate comfort level or baseline comfort level  Description: Interventions:  - Encourage patient to monitor pain and request assistance  - Assess pain using appropriate pain scale  - Administer analgesics based on type and severity of pain and evaluate response  - Implement non-pharmacological measures as appropriate and evaluate response  - Consider cultural and social influences on pain and pain management  - Notify physician/advanced practitioner if interventions unsuccessful or patient reports new pain  Outcome: Not Progressing     Problem: SAFETY ADULT  Goal: Patient will remain free of falls  Description: INTERVENTIONS:  - Educate patient/family on patient safety including physical limitations  - Instruct patient to call for assistance with activity   - Consult OT/PT to assist with strengthening/mobility   - Keep Call bell within reach  - Keep bed low and locked with side rails adjusted as appropriate  - Keep care items and personal belongings within reach  - Initiate and maintain comfort rounds  - Make Fall Risk Sign visible to staff  - Offer  Toileting every prn Hours, in advance of need  - Initiate/Maintain n/a alarm  - Obtain necessary fall risk management equipment: n/a  - Apply yellow socks and bracelet for high fall risk patients  - Consider moving patient to room near nurses station  Outcome: Not Progressing  Goal: Maintain or return to baseline ADL function  Description: INTERVENTIONS:  -  Assess patient's ability to carry out ADLs; assess patient's baseline for ADL function and identify physical deficits which impact ability to perform ADLs (bathing, care of mouth/teeth, toileting, grooming, dressing, etc.)  - Assess/evaluate cause of self-care deficits   - Assess range of motion  - Assess patient's mobility; develop plan if impaired  - Assess patient's need for assistive devices and provide as appropriate  - Encourage maximum independence but intervene and supervise when necessary  - Involve family in performance of ADLs  - Assess for home care needs following discharge   - Consider OT consult to assist with ADL evaluation and planning for discharge  - Provide patient education as appropriate  Outcome: Not Progressing  Goal: Maintains/Returns to pre admission functional level  Description: INTERVENTIONS:  - Perform AM-PAC 6 Click Basic Mobility/ Daily Activity assessment daily.  - Set and communicate daily mobility goal to care team and patient/family/caregiver.   - Collaborate with rehabilitation services on mobility goals if consulted  - Perform Range of Motion prn times a day.  - Reposition patient every prn hours.  - Dangle patient prn times a day  - Stand patient prn times a day  - Ambulate patient prn times a day  - Out of bed to chair prn times a day   - Out of bed for meals prn times a day  - Out of bed for toileting  - Record patient progress and toleration of activity level   Outcome: Not Progressing     Problem: DISCHARGE PLANNING  Goal: Discharge to home or other facility with appropriate resources  Description: INTERVENTIONS:  -  Identify barriers to discharge w/patient and caregiver  - Arrange for needed discharge resources and transportation as appropriate  - Identify discharge learning needs (meds, wound care, etc.)  - Arrange for interpretive services to assist at discharge as needed  - Refer to Case Management Department for coordinating discharge planning if the patient needs post-hospital services based on physician/advanced practitioner order or complex needs related to functional status, cognitive ability, or social support system  Outcome: Not Progressing     Problem: Knowledge Deficit  Goal: Patient/family/caregiver demonstrates understanding of disease process, treatment plan, medications, and discharge instructions  Description: Complete learning assessment and assess knowledge base.  Interventions:  - Provide teaching at level of understanding  - Provide teaching via preferred learning methods  Outcome: Not Progressing

## 2024-12-17 NOTE — ASSESSMENT & PLAN NOTE
"POA s/p mechanical fall this afternoon, patient tripped outside on the pavement landing on his right hip.  Patient able to ambulate after fall however a couple hours later he is unable to bear weight on the right leg due to the severe shooting pain in right leg and hip area  Denies hitting head, LOC, or dizziness  Ambulates independently at baseline  Assessment of right hip, tenderness noted upon palpitation, no abrasion, edema,  or bruising.  Patient unable to lift rigth leg up or back. Unable to tolerate weightbearing to the right leg  X-ray Right hip: no acute fracture noted, awaiting final read  CT R lower extremities: \"No acute fracture or subluxation of the right hip.Moderate to severe osteoarthritic degenerative changes of the right hip joint.\"  Suspect soft tissue injury right hip possbile ligaments  Plan  Pain management 975 mg Tyelnol q 8 hrs  PRN Oxy 2.5 mg and  5mg q 4 h, and break through IV morphine 2 mg q6 hr  PT consulted  Voltaren cream to affected joint  Fall precautions  Significant improvement in pain will plan to discharge with outpatient PT  "

## 2024-12-17 NOTE — ASSESSMENT & PLAN NOTE
"POA s/p post mechanical fall this afternoon patient tripped outside on the pavement landing on his right hip.  Patient able to ambulate after fall however a couple hours later he is unable to bear weight on the right leg due to the severe shooting pain/cramping  Denies hitting head, LOC, or dizziness  Ambulates independently at baseline  CT head No acute intracranial abnormality. Mild chronic small vessel ischemic changes and volume loss.  X-ray Right hip: no acute fracture noted, awaiting final read  CT R lower extremities: \"No acute fracture or subluxation of the right hip.Moderate to severe osteoarthritic degenerative changes of the right hip joint.\"  See plan above  Fall precautions    "

## 2024-12-17 NOTE — ASSESSMENT & PLAN NOTE
"POA s/p mechanical fall this afternoon, patient tripped outside on the pavement landing on his right hip.  Patient able to ambulate after fall however a couple hours later he is unable to bear weight on the right leg due to the severe shooting pain in right leg and hip area  Denies hitting head, LOC, or dizziness  Ambulates independently at baseline  Assessment of right hip, tenderness noted upon palpitation, no abrasion, edema,  or bruising.  Patient unable to lift rigth leg up or back. Unable to tolerate weightbearing to the right leg  X-ray Right hip: no acute fracture noted, awaiting final read  CT R lower extremities: \"No acute fracture or subluxation of the right hip.Moderate to severe osteoarthritic degenerative changes of the right hip joint.\"  Suspect soft tissue injury right hip possbile ligaments  Plan  Pain management 975 mg Tyelnol q 8 hrs  PRN Oxy 2.5 mg and  5mg q 4 h, and break through IV morphine 2 mg q6 hr  PT consulted  Voltaren cream to affected joint  Fall precautions  Consider orthopedics if pain does not improve     "

## 2024-12-27 ENCOUNTER — HOSPITAL ENCOUNTER (OUTPATIENT)
Dept: RADIOLOGY | Facility: HOSPITAL | Age: 84
End: 2024-12-27
Payer: MEDICARE

## 2024-12-27 DIAGNOSIS — J45.20 MILD INTERMITTENT ASTHMA WITHOUT COMPLICATION: ICD-10-CM

## 2024-12-27 DIAGNOSIS — R05.1 ACUTE COUGH: ICD-10-CM

## 2024-12-27 PROCEDURE — 71046 X-RAY EXAM CHEST 2 VIEWS: CPT

## 2024-12-31 ENCOUNTER — HOSPITAL ENCOUNTER (OUTPATIENT)
Dept: CT IMAGING | Facility: HOSPITAL | Age: 84
Discharge: HOME | End: 2024-12-31
Attending: INTERNAL MEDICINE
Payer: MEDICARE

## 2024-12-31 DIAGNOSIS — J18.9 PNEUMONIA, UNSPECIFIED ORGANISM: ICD-10-CM

## 2024-12-31 PROCEDURE — 71250 CT THORAX DX C-: CPT

## 2025-01-07 DIAGNOSIS — C91.10 CLL (CHRONIC LYMPHOCYTIC LEUKEMIA) (HCC): Primary | ICD-10-CM

## 2025-01-07 DIAGNOSIS — D80.3 IGG DEFICIENCY (HCC): ICD-10-CM

## 2025-01-10 ENCOUNTER — APPOINTMENT (OUTPATIENT)
Dept: LAB | Facility: HOSPITAL | Age: 85
End: 2025-01-10
Payer: MEDICARE

## 2025-01-10 ENCOUNTER — HOSPITAL ENCOUNTER (OUTPATIENT)
Dept: CT IMAGING | Facility: HOSPITAL | Age: 85
End: 2025-01-10
Attending: INTERNAL MEDICINE
Payer: MEDICARE

## 2025-01-10 DIAGNOSIS — D80.1 HYPOGAMMAGLOBULINEMIA (HCC): ICD-10-CM

## 2025-01-10 DIAGNOSIS — J18.9 PNEUMONIA, UNSPECIFIED ORGANISM: ICD-10-CM

## 2025-01-10 DIAGNOSIS — D80.3 IGG DEFICIENCY (HCC): ICD-10-CM

## 2025-01-10 DIAGNOSIS — C91.10 CLL (CHRONIC LYMPHOCYTIC LEUKEMIA) (HCC): ICD-10-CM

## 2025-01-10 LAB
ALBUMIN SERPL BCG-MCNC: 3.9 G/DL (ref 3.5–5)
ALP SERPL-CCNC: 76 U/L (ref 34–104)
ALT SERPL W P-5'-P-CCNC: 26 U/L (ref 7–52)
ANION GAP SERPL CALCULATED.3IONS-SCNC: 6 MMOL/L (ref 4–13)
ANISOCYTOSIS BLD QL SMEAR: PRESENT
AST SERPL W P-5'-P-CCNC: 20 U/L (ref 13–39)
BASOPHILS # BLD MANUAL: 0.31 THOUSAND/UL (ref 0–0.1)
BASOPHILS NFR MAR MANUAL: 3 % (ref 0–1)
BILIRUB SERPL-MCNC: 0.45 MG/DL (ref 0.2–1)
BUN SERPL-MCNC: 18 MG/DL (ref 5–25)
CALCIUM SERPL-MCNC: 8.7 MG/DL (ref 8.4–10.2)
CHLORIDE SERPL-SCNC: 100 MMOL/L (ref 96–108)
CO2 SERPL-SCNC: 30 MMOL/L (ref 21–32)
CREAT SERPL-MCNC: 0.95 MG/DL (ref 0.6–1.3)
EOSINOPHIL # BLD MANUAL: 0.94 THOUSAND/UL (ref 0–0.4)
EOSINOPHIL NFR BLD MANUAL: 9 % (ref 0–6)
ERYTHROCYTE [DISTWIDTH] IN BLOOD BY AUTOMATED COUNT: 13.7 % (ref 11.6–15.1)
GFR SERPL CREATININE-BSD FRML MDRD: 74 ML/MIN/1.73SQ M
GLUCOSE P FAST SERPL-MCNC: 152 MG/DL (ref 65–99)
HCT VFR BLD AUTO: 31.8 % (ref 36.5–49.3)
HGB BLD-MCNC: 9.9 G/DL (ref 12–17)
IGA SERPL-MCNC: 12 MG/DL (ref 66–433)
IGG SERPL-MCNC: 385 MG/DL (ref 635–1741)
IGM SERPL-MCNC: <20 MG/DL (ref 45–281)
LDH SERPL-CCNC: 132 U/L (ref 140–271)
LYMPHOCYTES # BLD AUTO: 36 % (ref 14–44)
LYMPHOCYTES # BLD AUTO: 4.06 THOUSAND/UL (ref 0.6–4.47)
MCH RBC QN AUTO: 28.1 PG (ref 26.8–34.3)
MCHC RBC AUTO-ENTMCNC: 31.1 G/DL (ref 31.4–37.4)
MCV RBC AUTO: 90 FL (ref 82–98)
METAMYELOCYTE ABSOLUTE CT: 0.1 THOUSAND/UL (ref 0–0.1)
METAMYELOCYTES NFR BLD MANUAL: 1 % (ref 0–1)
MONOCYTES # BLD AUTO: 0.73 THOUSAND/UL (ref 0–1.22)
MONOCYTES NFR BLD: 7 % (ref 4–12)
NEUTROPHILS # BLD MANUAL: 4.27 THOUSAND/UL (ref 1.85–7.62)
NEUTS SEG NFR BLD AUTO: 41 % (ref 43–75)
OVALOCYTES BLD QL SMEAR: PRESENT
PLATELET # BLD AUTO: 191 THOUSANDS/UL (ref 149–390)
PLATELET BLD QL SMEAR: ADEQUATE
PMV BLD AUTO: 10.9 FL (ref 8.9–12.7)
POIKILOCYTOSIS BLD QL SMEAR: PRESENT
POLYCHROMASIA BLD QL SMEAR: PRESENT
POTASSIUM SERPL-SCNC: 4.5 MMOL/L (ref 3.5–5.3)
PROT SERPL-MCNC: 5.8 G/DL (ref 6.4–8.4)
RBC # BLD AUTO: 3.52 MILLION/UL (ref 3.88–5.62)
RBC MORPH BLD: PRESENT
SODIUM SERPL-SCNC: 136 MMOL/L (ref 135–147)
URATE SERPL-MCNC: 4.1 MG/DL (ref 3.5–8.5)
VARIANT LYMPHS # BLD AUTO: 3 %
WBC # BLD AUTO: 10.41 THOUSAND/UL (ref 4.31–10.16)

## 2025-01-10 PROCEDURE — 85007 BL SMEAR W/DIFF WBC COUNT: CPT

## 2025-01-10 PROCEDURE — 82784 ASSAY IGA/IGD/IGG/IGM EACH: CPT

## 2025-01-10 PROCEDURE — 83615 LACTATE (LD) (LDH) ENZYME: CPT

## 2025-01-10 PROCEDURE — 71250 CT THORAX DX C-: CPT

## 2025-01-10 PROCEDURE — 80053 COMPREHEN METABOLIC PANEL: CPT

## 2025-01-10 PROCEDURE — 85027 COMPLETE CBC AUTOMATED: CPT

## 2025-01-10 PROCEDURE — 84550 ASSAY OF BLOOD/URIC ACID: CPT

## 2025-01-10 PROCEDURE — 36415 COLL VENOUS BLD VENIPUNCTURE: CPT

## 2025-01-14 ENCOUNTER — TELEPHONE (OUTPATIENT)
Age: 85
End: 2025-01-14

## 2025-01-14 NOTE — TELEPHONE ENCOUNTER
Felipa is calling and would like to speak to Dr Whittaker regarding Mynor's IVIG appointment on 1/20/24.  She stated that Mynor has had pneumonia 3 times in the last few months and Dr Catalan was wondering if he should have his IVIG infusion sooner.  I offered to have her speak to [facility] but she declined.  Felipa can be reached at [phone].

## 2025-01-15 ENCOUNTER — TELEPHONE (OUTPATIENT)
Dept: HEMATOLOGY ONCOLOGY | Facility: CLINIC | Age: 85
End: 2025-01-15

## 2025-01-15 DIAGNOSIS — D80.3 IGG DEFICIENCY (HCC): ICD-10-CM

## 2025-01-15 DIAGNOSIS — C91.10 CLL (CHRONIC LYMPHOCYTIC LEUKEMIA) (HCC): Primary | ICD-10-CM

## 2025-01-15 RX ORDER — HYDROCORTISONE SODIUM SUCCINATE 100 MG/2ML
100 INJECTION INTRAMUSCULAR; INTRAVENOUS ONCE
Status: CANCELLED | OUTPATIENT
Start: 2025-01-15

## 2025-01-15 RX ORDER — ACETAMINOPHEN 325 MG/1
650 TABLET ORAL ONCE
Status: CANCELLED | OUTPATIENT
Start: 2025-01-20

## 2025-01-15 RX ORDER — SODIUM CHLORIDE 9 MG/ML
20 INJECTION, SOLUTION INTRAVENOUS ONCE
Status: CANCELLED | OUTPATIENT
Start: 2025-01-15

## 2025-01-15 RX ORDER — SODIUM CHLORIDE 9 MG/ML
20 INJECTION, SOLUTION INTRAVENOUS ONCE
Status: CANCELLED | OUTPATIENT
Start: 2025-01-20

## 2025-01-15 RX ORDER — DIPHENHYDRAMINE HCL 25 MG
12.5 TABLET ORAL ONCE
Status: CANCELLED | OUTPATIENT
Start: 2025-01-20

## 2025-01-15 RX ORDER — ACETAMINOPHEN 325 MG/1
650 TABLET ORAL ONCE
Status: CANCELLED | OUTPATIENT
Start: 2025-01-15

## 2025-01-15 RX ORDER — HYDROCORTISONE SODIUM SUCCINATE 100 MG/2ML
100 INJECTION INTRAMUSCULAR; INTRAVENOUS ONCE
Status: CANCELLED | OUTPATIENT
Start: 2025-01-20

## 2025-01-15 RX ORDER — DIPHENHYDRAMINE HCL 25 MG
12.5 TABLET ORAL ONCE
Status: CANCELLED | OUTPATIENT
Start: 2025-01-15

## 2025-01-15 NOTE — TELEPHONE ENCOUNTER
I spoke with patient last evening about frequency of IVIG because of frequent pneumonias.  His wife had called the office.  Yesterday told him that I could increase the dose of IVIG.  I noticed today that he has been getting IVIG once every 8 weeks.  In that case instead of increasing the dose frequency could be changed to once every 4 weeks.  My nurse will let him know.

## 2025-01-15 NOTE — TELEPHONE ENCOUNTER
Dr. Whittaker has reviewed patient's dose and will not change the IVIG dose  However, he will change the frequency from every 56 days to every 28 days  Updated this in the system, please adjust the schedule

## 2025-01-15 NOTE — TELEPHONE ENCOUNTER
Dr. Whittaker spoke with patient last night and will increase the dose of IVIG  No change in scheudle

## 2025-01-20 ENCOUNTER — HOSPITAL ENCOUNTER (OUTPATIENT)
Dept: INFUSION CENTER | Facility: CLINIC | Age: 85
Discharge: HOME | End: 2025-01-20
Payer: MEDICARE

## 2025-01-20 VITALS
HEART RATE: 97 BPM | SYSTOLIC BLOOD PRESSURE: 160 MMHG | RESPIRATION RATE: 18 BRPM | DIASTOLIC BLOOD PRESSURE: 63 MMHG | TEMPERATURE: 97.1 F | WEIGHT: 165.5 LBS | BODY MASS INDEX: 23.75 KG/M2 | OXYGEN SATURATION: 94 %

## 2025-01-20 DIAGNOSIS — C91.10 CLL (CHRONIC LYMPHOCYTIC LEUKEMIA) (HCC): ICD-10-CM

## 2025-01-20 DIAGNOSIS — D80.3 IGG DEFICIENCY (HCC): Primary | ICD-10-CM

## 2025-01-20 PROCEDURE — 96375 TX/PRO/DX INJ NEW DRUG ADDON: CPT

## 2025-01-20 PROCEDURE — 96366 THER/PROPH/DIAG IV INF ADDON: CPT

## 2025-01-20 PROCEDURE — 96365 THER/PROPH/DIAG IV INF INIT: CPT

## 2025-01-20 RX ORDER — DIPHENHYDRAMINE HCL 25 MG
12.5 TABLET ORAL ONCE
Status: COMPLETED | OUTPATIENT
Start: 2025-01-20 | End: 2025-01-20

## 2025-01-20 RX ORDER — SODIUM CHLORIDE 9 MG/ML
20 INJECTION, SOLUTION INTRAVENOUS ONCE
OUTPATIENT
Start: 2025-02-17

## 2025-01-20 RX ORDER — HYDROCORTISONE SODIUM SUCCINATE 100 MG/2ML
100 INJECTION INTRAMUSCULAR; INTRAVENOUS ONCE
OUTPATIENT
Start: 2025-02-17

## 2025-01-20 RX ORDER — HYDROCORTISONE SODIUM SUCCINATE 100 MG/2ML
100 INJECTION INTRAMUSCULAR; INTRAVENOUS ONCE
Status: COMPLETED | OUTPATIENT
Start: 2025-01-20 | End: 2025-01-20

## 2025-01-20 RX ORDER — ACETAMINOPHEN 325 MG/1
650 TABLET ORAL ONCE
Status: COMPLETED | OUTPATIENT
Start: 2025-01-20 | End: 2025-01-20

## 2025-01-20 RX ORDER — DIPHENHYDRAMINE HCL 25 MG
12.5 TABLET ORAL ONCE
OUTPATIENT
Start: 2025-02-17

## 2025-01-20 RX ORDER — SODIUM CHLORIDE 9 MG/ML
20 INJECTION, SOLUTION INTRAVENOUS ONCE
Status: COMPLETED | OUTPATIENT
Start: 2025-01-20 | End: 2025-01-20

## 2025-01-20 RX ORDER — ACETAMINOPHEN 325 MG/1
650 TABLET ORAL ONCE
OUTPATIENT
Start: 2025-02-17

## 2025-01-20 RX ADMIN — SODIUM CHLORIDE 20 ML/HR: 0.9 INJECTION, SOLUTION INTRAVENOUS at 09:23

## 2025-01-20 RX ADMIN — DIPHENHYDRAMINE HYDROCHLORIDE 12.5 MG: 25 TABLET ORAL at 09:23

## 2025-01-20 RX ADMIN — Medication 30 G: at 10:07

## 2025-01-20 RX ADMIN — ACETAMINOPHEN 650 MG: 325 TABLET ORAL at 09:23

## 2025-01-20 RX ADMIN — HYDROCORTISONE SODIUM SUCCINATE 100 MG: 100 INJECTION, POWDER, FOR SOLUTION INTRAMUSCULAR; INTRAVENOUS at 09:23

## 2025-01-20 NOTE — PROGRESS NOTES
Patient arrives to infusion center for IVIG. PIV placed without issue, patient tolerated well. Patient resting on recliner chair, call bell within reach.

## 2025-01-20 NOTE — PROGRESS NOTES
Patient tolerated IVIG without issue. PIV removed intact coban wrap in place. Patient instructed to make next appt up on DC with  staff.

## 2025-02-10 ENCOUNTER — TRANSCRIBE ORDERS (OUTPATIENT)
Dept: VASCULAR SURGERY | Facility: HOSPITAL | Age: 85
End: 2025-02-10

## 2025-02-10 DIAGNOSIS — I73.9 PAD (PERIPHERAL ARTERY DISEASE) (HCC): Primary | ICD-10-CM

## 2025-02-14 ENCOUNTER — PATIENT MESSAGE (OUTPATIENT)
Dept: VASCULAR SURGERY | Facility: CLINIC | Age: 85
End: 2025-02-14

## 2025-05-14 DIAGNOSIS — D80.3 IGG DEFICIENCY (HCC): ICD-10-CM

## 2025-05-14 DIAGNOSIS — C91.10 CLL (CHRONIC LYMPHOCYTIC LEUKEMIA) (HCC): Primary | ICD-10-CM

## 2025-05-14 RX ORDER — DIPHENHYDRAMINE HCL 25 MG
12.5 TABLET ORAL ONCE
Status: CANCELLED | OUTPATIENT
Start: 2025-05-16

## 2025-05-14 RX ORDER — SODIUM CHLORIDE 9 MG/ML
20 INJECTION, SOLUTION INTRAVENOUS ONCE
Status: CANCELLED | OUTPATIENT
Start: 2025-05-16

## 2025-05-14 RX ORDER — HYDROCORTISONE SODIUM SUCCINATE 100 MG/2ML
100 INJECTION INTRAMUSCULAR; INTRAVENOUS ONCE
Status: CANCELLED | OUTPATIENT
Start: 2025-05-16

## 2025-05-14 RX ORDER — ACETAMINOPHEN 325 MG/1
650 TABLET ORAL ONCE
Status: CANCELLED | OUTPATIENT
Start: 2025-05-16

## 2025-05-16 ENCOUNTER — HOSPITAL ENCOUNTER (OUTPATIENT)
Dept: INFUSION CENTER | Facility: CLINIC | Age: 85
End: 2025-05-16
Payer: MEDICARE

## 2025-05-16 VITALS
TEMPERATURE: 97.5 F | WEIGHT: 161 LBS | DIASTOLIC BLOOD PRESSURE: 58 MMHG | SYSTOLIC BLOOD PRESSURE: 138 MMHG | HEART RATE: 74 BPM | OXYGEN SATURATION: 95 % | RESPIRATION RATE: 18 BRPM | BODY MASS INDEX: 23.1 KG/M2

## 2025-05-16 DIAGNOSIS — C91.10 CLL (CHRONIC LYMPHOCYTIC LEUKEMIA) (HCC): Primary | ICD-10-CM

## 2025-05-16 DIAGNOSIS — D80.3 IGG DEFICIENCY (HCC): ICD-10-CM

## 2025-05-16 RX ORDER — HYDROCORTISONE SODIUM SUCCINATE 100 MG/2ML
100 INJECTION INTRAMUSCULAR; INTRAVENOUS ONCE
Status: COMPLETED | OUTPATIENT
Start: 2025-05-16 | End: 2025-05-16

## 2025-05-16 RX ORDER — DIPHENHYDRAMINE HCL 25 MG
12.5 TABLET ORAL ONCE
OUTPATIENT
Start: 2025-06-13

## 2025-05-16 RX ORDER — DIPHENHYDRAMINE HCL 25 MG
12.5 TABLET ORAL ONCE
Status: COMPLETED | OUTPATIENT
Start: 2025-05-16 | End: 2025-05-16

## 2025-05-16 RX ORDER — SODIUM CHLORIDE 9 MG/ML
20 INJECTION, SOLUTION INTRAVENOUS ONCE
OUTPATIENT
Start: 2025-06-13

## 2025-05-16 RX ORDER — SODIUM CHLORIDE 9 MG/ML
20 INJECTION, SOLUTION INTRAVENOUS ONCE
Status: COMPLETED | OUTPATIENT
Start: 2025-05-16 | End: 2025-05-16

## 2025-05-16 RX ORDER — HYDROCORTISONE SODIUM SUCCINATE 100 MG/2ML
100 INJECTION INTRAMUSCULAR; INTRAVENOUS ONCE
OUTPATIENT
Start: 2025-06-13

## 2025-05-16 RX ORDER — ACETAMINOPHEN 325 MG/1
650 TABLET ORAL ONCE
OUTPATIENT
Start: 2025-06-13

## 2025-05-16 RX ORDER — ACETAMINOPHEN 325 MG/1
650 TABLET ORAL ONCE
Status: COMPLETED | OUTPATIENT
Start: 2025-05-16 | End: 2025-05-16

## 2025-05-16 RX ADMIN — HYDROCORTISONE SODIUM SUCCINATE 100 MG: 100 INJECTION, POWDER, FOR SOLUTION INTRAMUSCULAR; INTRAVENOUS at 08:20

## 2025-05-16 RX ADMIN — ACETAMINOPHEN 650 MG: 325 TABLET ORAL at 08:21

## 2025-05-16 RX ADMIN — Medication 30 G: at 08:56

## 2025-05-16 RX ADMIN — SODIUM CHLORIDE 20 ML/HR: 0.9 INJECTION, SOLUTION INTRAVENOUS at 08:20

## 2025-05-16 RX ADMIN — DIPHENHYDRAMINE HYDROCHLORIDE 12.5 MG: 25 TABLET ORAL at 08:20

## 2025-05-16 NOTE — PROGRESS NOTES
Patient tolerated treatment today without complications. Patient scheduled next treatment for 6/13 at 0830. Appointment card provided.

## 2025-05-16 NOTE — PROGRESS NOTES
Patient arrives for IVIG today. PIV placed by JUWAN RN without issue, patient tolerated well. Patient resting on recliner chair, call bell within reach.

## 2025-06-02 ENCOUNTER — TELEPHONE (OUTPATIENT)
Dept: HEMATOLOGY ONCOLOGY | Facility: CLINIC | Age: 85
End: 2025-06-02

## 2025-06-02 ENCOUNTER — OFFICE VISIT (OUTPATIENT)
Dept: HEMATOLOGY ONCOLOGY | Facility: CLINIC | Age: 85
End: 2025-06-02
Payer: MEDICARE

## 2025-06-02 VITALS
SYSTOLIC BLOOD PRESSURE: 122 MMHG | TEMPERATURE: 97.2 F | BODY MASS INDEX: 23.19 KG/M2 | HEART RATE: 79 BPM | HEIGHT: 70 IN | RESPIRATION RATE: 18 BRPM | OXYGEN SATURATION: 97 % | DIASTOLIC BLOOD PRESSURE: 70 MMHG | WEIGHT: 162 LBS

## 2025-06-02 DIAGNOSIS — I65.23 BILATERAL CAROTID ARTERY STENOSIS: ICD-10-CM

## 2025-06-02 DIAGNOSIS — C91.10 CLL (CHRONIC LYMPHOCYTIC LEUKEMIA) (HCC): Primary | ICD-10-CM

## 2025-06-02 DIAGNOSIS — D50.0 BLOOD LOSS ANEMIA: ICD-10-CM

## 2025-06-02 DIAGNOSIS — Z86.79 HISTORY OF ATRIAL FIBRILLATION: ICD-10-CM

## 2025-06-02 DIAGNOSIS — D80.3 IGG DEFICIENCY (HCC): ICD-10-CM

## 2025-06-02 PROCEDURE — G2211 COMPLEX E/M VISIT ADD ON: HCPCS | Performed by: INTERNAL MEDICINE

## 2025-06-02 PROCEDURE — 99214 OFFICE O/P EST MOD 30 MIN: CPT | Performed by: INTERNAL MEDICINE

## 2025-06-02 RX ORDER — QUETIAPINE FUMARATE 25 MG/1
25 TABLET, FILM COATED ORAL DAILY
COMMUNITY
Start: 2025-03-04

## 2025-06-02 NOTE — PATIENT INSTRUCTIONS
Blood work within 1 week prior to IVIG therapy.  Blood work every 4 weeks and IVIG therapy every 4 weeks follow-up in 3 months.

## 2025-06-02 NOTE — ASSESSMENT & PLAN NOTE
Patient states right artery is blocked and he has collaterals.  In December he had surgery on his left carotid artery and he had bleeding in spite of stopping acalabrutinib 3 days prior to surgery.  Hemoglobin dropped from 12.9-9.7 and he was given intravenous iron in [place] and hemoglobin has come up to 12.3

## 2025-06-02 NOTE — ASSESSMENT & PLAN NOTE
Patient has been getting IVIG therapy every month and he had pneumonia when he was in [place]  Orders:    IgG; Standing

## 2025-06-02 NOTE — ASSESSMENT & PLAN NOTE
Patient is on acalabrutinib 100 mg twice a day and he is responding  Orders:    CBC and differential; Standing    Comprehensive metabolic panel; Standing    IgG; Standing

## 2025-06-02 NOTE — PROGRESS NOTES
Name: Mynor Villaseñor      : 1940      MRN: [id number]  Encounter Provider: Mauri Whittaker MD  Encounter Date: 2025   Encounter department: [facility] [place]  :  Assessment & Plan  CLL (chronic lymphocytic leukemia) (HCC)  Patient is on acalabrutinib 100 mg twice a day and he is responding  Orders:    CBC and differential; Standing    Comprehensive metabolic panel; Standing    IgG; Standing    History of atrial fibrillation  Patient follows with his cardiologist       Bilateral carotid artery stenosis  Patient states right artery is blocked and he has collaterals.  In December he had surgery on his left carotid artery and he had bleeding in spite of stopping acalabrutinib 3 days prior to surgery.  Hemoglobin dropped from 12.9-9.7 and he was given intravenous iron in [place] and hemoglobin has come up to 12.3       IgG deficiency (HCC)  Patient has been getting IVIG therapy every month and he had pneumonia when he was in [place]  Orders:    IgG; Standing    Blood loss anemia  Post carotid artery surgery and patient received intravenous iron and that helped  Orders:    CBC and differential; Standing    Blood work within 1 week prior to IVIG therapy.  Blood work every 4 weeks and IVIG therapy every 4 weeks follow-up in 3 months.  Goal is to keep CLL in remission and to prevent infection and bleeding.  No change in therapy, acalabrutinib and IVIG.  All discussed in detail.  Questions answered.  Patient is capable of self-care.  I suggested eating healthy foods.  Patient to avoid falls and trauma.  Patient to continue to follow-up with primary physician and other consultants.  Provided counseling and support.  I used a dictation device to dictate this note and there could be mistakes in my note and for that patient may contact my office.    Return in about 3 months (around 2025) for Follow up [facility].    History of Present Illness   Chief Complaint   Patient  "presents with    Follow-up   Patient is here with his wife.  He has CLL and low IgG level.  He has been on acalabrutinib 100 mg twice a day and IVIG therapy every month.  In December he had left carotid artery surgery and had bleeding and had intravenous iron and that improved the blood loss anemia.  He has tiredness.  He had an episode of pneumonia when he was in [place] during winter months.  He is feeling better now.  Has arthritic symptoms.  Has some hearing impairment.  Pertinent Medical History   See details in HPI.  06/02/25:      Review of Systems  Reviewed 12 systems.  Symptoms are in HPI.  No other neurological, cardiac, pulmonary, GI and  symptoms.  No fever, chills, active bleeding at present, bone pains, skin rash, weight loss, night sweats and no swelling of the ankles and no swollen glands.      Objective   /70 (BP Location: Left arm, Patient Position: Sitting, Cuff Size: Adult)   Pulse 79   Temp (!) 97.2 °F (36.2 °C) (Temporal)   Resp 18   Ht 5' 10\" (1.778 m)   Wt 73.5 kg (162 lb)   SpO2 97%   BMI 23.24 kg/m²     Physical Exam  Vitals reviewed.   Constitutional:       General: He is not in acute distress.     Appearance: Normal appearance. He is not ill-appearing.   HENT:      Head: Normocephalic and atraumatic.      Ears:      Comments: Hearing impairment     Mouth/Throat:      Comments: No thrush.    Eyes:      General: No scleral icterus.        Right eye: No discharge.         Left eye: No discharge.      Conjunctiva/sclera: Conjunctivae normal.       Cardiovascular:      Rate and Rhythm: Normal rate and regular rhythm.      Heart sounds: Murmur heard.   Pulmonary:      Effort: Pulmonary effort is normal. No respiratory distress.      Breath sounds: Normal breath sounds. No rhonchi or rales.   Abdominal:      General: Abdomen is flat. There is no distension.      Palpations: Abdomen is soft. There is no mass.      Tenderness: There is no abdominal tenderness.      Comments: No " ascites.      Musculoskeletal:         General: No swelling or tenderness. Normal range of motion.      Cervical back: Normal range of motion. No rigidity or tenderness.      Right lower leg: No edema.      Left lower leg: No edema.      Comments: No calf tenderness.   Lymphadenopathy:      Cervical: No cervical adenopathy.      Upper Body:      Right upper body: No supraclavicular or axillary adenopathy.      Left upper body: No supraclavicular or axillary adenopathy.     Skin:     General: Skin is warm.      Coloration: Skin is not jaundiced or pale.      Findings: No bruising or rash.      Nails: There is no clubbing.     Neurological:      General: No focal deficit present.      Mental Status: He is alert and oriented to person, place, and time.      Motor: No weakness.      Coordination: Coordination normal.      Gait: Gait normal.     Psychiatric:         Mood and Affect: Mood normal.         Behavior: Behavior normal.         Thought Content: Thought content normal.     ECOG 1    Labs: I have reviewed the following labs:  Results for orders placed or performed in visit on 01/10/25   CBC and differential   Result Value Ref Range    WBC 10.41 (H) 4.31 - 10.16 Thousand/uL    RBC 3.52 (L) 3.88 - 5.62 Million/uL    Hemoglobin 9.9 (L) 12.0 - 17.0 g/dL    Hematocrit 31.8 (L) 36.5 - 49.3 %    MCV 90 82 - 98 fL    MCH 28.1 26.8 - 34.3 pg    MCHC 31.1 (L) 31.4 - 37.4 g/dL    RDW 13.7 11.6 - 15.1 %    MPV 10.9 8.9 - 12.7 fL    Platelets 191 149 - 390 Thousands/uL   Comprehensive metabolic panel   Result Value Ref Range    Sodium 136 135 - 147 mmol/L    Potassium 4.5 3.5 - 5.3 mmol/L    Chloride 100 96 - 108 mmol/L    CO2 30 21 - 32 mmol/L    ANION GAP 6 4 - 13 mmol/L    BUN 18 5 - 25 mg/dL    Creatinine 0.95 0.60 - 1.30 mg/dL    Glucose, Fasting 152 (H) 65 - 99 mg/dL    Calcium 8.7 8.4 - 10.2 mg/dL    AST 20 13 - 39 U/L    ALT 26 7 - 52 U/L    Alkaline Phosphatase 76 34 - 104 U/L    Total Protein 5.8 (L) 6.4 - 8.4 g/dL     Albumin 3.9 3.5 - 5.0 g/dL    Total Bilirubin 0.45 0.20 - 1.00 mg/dL    eGFR 74 ml/min/1.73sq m   IgG, IgA, IgM   Result Value Ref Range    IGA 12 (L) 66 - 433 mg/dL     (L) 635 - 1,741 mg/dL    IGM <20 (L) 45 - 281 mg/dL   LD,Blood   Result Value Ref Range     (L) 140 - 271 U/L   Result Value Ref Range    Uric Acid 4.1 3.5 - 8.5 mg/dL   Manual Differential(PHLEBS Do Not Order)   Result Value Ref Range    Segmented % 41 (L) 43 - 75 %    Lymphocytes % 36 14 - 44 %    Monocytes % 7 4 - 12 %    Eosinophils % 9 (H) 0 - 6 %    Basophils % 3 (H) 0 - 1 %    Metamyelocytes % 1 0 - 1 %    Atypical Lymphocytes % 3 (H) <=0 %    Absolute Neutrophils 4.27 1.85 - 7.62 Thousand/uL    Absolute Lymphocytes 4.06 0.60 - 4.47 Thousand/uL    Absolute Monocytes 0.73 0.00 - 1.22 Thousand/uL    Absolute Eosinophils 0.94 (H) 0.00 - 0.40 Thousand/uL    Absolute Basophils 0.31 (H) 0.00 - 0.10 Thousand/uL    Absolute Metamyelocytes 0.10 0.00 - 0.10 Thousand/uL    Total Counted      RBC Morphology Present     Platelet Estimate Adequate Adequate    Anisocytosis Present     Ovalocytes Present     Poikilocytes Present     Polychromasia Present      CBC AND DIFFERENTIAL  Status: Final result         suggestion  Result Information displayed in this report will not trend and may not trigger automated decision support.      Contains abnormal data CBC AND DIFFERENTIAL  Order: [id number]  Component  Ref Range & Units 5/5/25 10:28 AM   White Blood Cells  4.0 - 11.0 10*3/uL 9.1   RBC  4.30 - 5.80 10*6/uL 4.25 Low    Hemoglobin  13.5 - 17.5 g/dL 12.3 Low    Hematocrit  40 - 52 % 37 Low    MCV  80 - 100 fL 86   MCH  27 - 33 pg 29   MCHC  31 - 36 g/dL 33   RDW  11.5 - 14.5 % 23.4 High    Platelet  150 - 400 10*3/uL 164   Narrative    Test performed at [facility], [address], [place] [address]  COMPREHENSIVE METABOLIC PANEL  Status: Final result        COMPREHENSIVE METABOLIC PANEL  Order: [id number]    Status: Final result       Next appt: 06/13/2025 at 08:30 AM in Infusion Therapy (AN INF CHAIR 2)              Component  Ref Range & Units (hover) 5/5/25 10:28 AM 1/10/25  2:34 PM 12/17/24  4:44 AM 12/16/24  9:48 PM 12/9/24  5:41 AM 12/8/24  4:20 AM 12/7/24  1:44 AM   Glucose 97  137 R,  High  R,  High  123 High  147 High    BUN 19 18 R 13 R 17 R 10 15 16   Creatinine 0.98 0.95 R, CM 0.79 R, CM 0.88 R, CM 0.75 0.82 0.86   Sodium 140 136 R 136 R 136 R 135 Low  130 Low  133 Low    Potassium 4.5 4.5 R 3.9 R 4.3 R 3.7 4.2 4.3   Chloride 104 100 R 99 R 99 R 98 Low  94 Low  99 Low    Carbon Dioxide 31 30 R 28 R 29 R 29 29 26   ANION GAP 5 6 R 9 R 8 R 8 CM 7 CM 8 CM   Comment: With hypoalbuminemia, anion gap correction is suggested using Na - (Cl+CO2) +  2.5*(4 - Albumin).  The lab reports Na - (Cl+CO2).   Calcium 9.3 8.7 R 8.6 R 9.2 R 8.5 Low  8.6 Low  8.6 Low    Protein, Total 6.4 5.8 Low  R  6.5 R      ALBUMIN 4.4 3.9 R  4.0 R      ALANINE AMINOTRANSFERASE 26 26 R, CM  22 R, CM      ASPAR AMINOTRANSFERASE 22 20 R  16 R      Alkaline Phosphatase 47 76 R  67 R      Total Bilirubin 0.6 0.45 R, CM  0.55 R, CM                 Narrative    Test performed at [facility], [address] [place] [address], [place] [address]   Specimen Collected: 05/05/25 10:28 AM    Performed By: [facility] LABORATORY Last Resulted: 05/05/25 11:37 AM       Uric acid 4.3    [facility] [facility] [place] [facility] [place] and [facility]  Outside Information  Results  X-Ray Chest Frontal And Lateral (Order [id number])      suggestion  Information displayed in this report may not trend or trigger automated decision support.     X-Ray Chest Frontal And Lateral  Order: [id number]  Impression    IMPRESSION:  No definite acute abnormality.  Narrative    EXAM DESCRIPTION:  X-RAY CHEST FRONTAL AND LATERAL    CLINICAL HISTORY:  Cough    COMPARISON:  2/3/2018 chest radiographs    FINDINGS:  Incompletely imaged  costophrenic sulci. No definite pleural effusion. No pneumothorax demonstrated.    No consolidation.    Grossly unchanged cardiomediastinal silhouette.    Atherosclerotic calcifications.    No acute osseous abnormality identified.          Signed by: Sung Oviedo 02/07/2025 12:08:24  Exam End: 02/07/25  2:45 PM Last Resulted: 02/07/25  3:08 PM   Received From: [facility] [facility] [place] [facility] [place] and Affiliates  Result Received: 05/14/25 10:27 AM

## 2025-06-02 NOTE — ASSESSMENT & PLAN NOTE
Post carotid artery surgery and patient received intravenous iron and that helped  Orders:    CBC and differential; Standing

## 2025-06-11 ENCOUNTER — TRANSCRIBE ORDERS (OUTPATIENT)
Dept: ADMINISTRATIVE | Facility: HOSPITAL | Age: 85
End: 2025-06-11

## 2025-06-11 DIAGNOSIS — E13.69 OTHER SPECIFIED DIABETES MELLITUS WITH OTHER SPECIFIED COMPLICATION, UNSPECIFIED WHETHER LONG TERM INSULIN USE (HCC): ICD-10-CM

## 2025-06-11 DIAGNOSIS — I10 ESSENTIAL (PRIMARY) HYPERTENSION: ICD-10-CM

## 2025-06-11 DIAGNOSIS — R91.1 SOLITARY PULMONARY NODULE: Primary | ICD-10-CM

## 2025-06-11 DIAGNOSIS — E78.49 FAMILIAL COMBINED HYPERLIPIDEMIA: ICD-10-CM

## 2025-06-11 DIAGNOSIS — R91.1 LUNG NODULE: ICD-10-CM

## 2025-06-12 ENCOUNTER — APPOINTMENT (OUTPATIENT)
Dept: LAB | Facility: CLINIC | Age: 85
End: 2025-06-12
Payer: MEDICARE

## 2025-06-12 DIAGNOSIS — R91.1 LUNG NODULE: ICD-10-CM

## 2025-06-12 DIAGNOSIS — I10 ESSENTIAL (PRIMARY) HYPERTENSION: ICD-10-CM

## 2025-06-12 DIAGNOSIS — R91.1 SOLITARY PULMONARY NODULE: ICD-10-CM

## 2025-06-12 DIAGNOSIS — E78.49 FAMILIAL COMBINED HYPERLIPIDEMIA: ICD-10-CM

## 2025-06-12 DIAGNOSIS — E13.69 OTHER SPECIFIED DIABETES MELLITUS WITH OTHER SPECIFIED COMPLICATION, UNSPECIFIED WHETHER LONG TERM INSULIN USE (HCC): ICD-10-CM

## 2025-06-12 LAB
ALBUMIN SERPL BCG-MCNC: 4.4 G/DL (ref 3.5–5)
ALP SERPL-CCNC: 54 U/L (ref 34–104)
ALT SERPL W P-5'-P-CCNC: 20 U/L (ref 7–52)
ANION GAP SERPL CALCULATED.3IONS-SCNC: 9 MMOL/L (ref 4–13)
AST SERPL W P-5'-P-CCNC: 21 U/L (ref 13–39)
BASOPHILS # BLD AUTO: 0.02 THOUSANDS/ÂΜL (ref 0–0.1)
BASOPHILS NFR BLD AUTO: 0 % (ref 0–1)
BILIRUB SERPL-MCNC: 0.76 MG/DL (ref 0.2–1)
BUN SERPL-MCNC: 12 MG/DL (ref 5–25)
CALCIUM SERPL-MCNC: 9.1 MG/DL (ref 8.4–10.2)
CHLORIDE SERPL-SCNC: 98 MMOL/L (ref 96–108)
CHOLEST SERPL-MCNC: 147 MG/DL (ref ?–200)
CO2 SERPL-SCNC: 29 MMOL/L (ref 21–32)
CREAT SERPL-MCNC: 0.81 MG/DL (ref 0.6–1.3)
CREAT UR-MCNC: 113.9 MG/DL
EOSINOPHIL # BLD AUTO: 0.06 THOUSAND/ÂΜL (ref 0–0.61)
EOSINOPHIL NFR BLD AUTO: 1 % (ref 0–6)
ERYTHROCYTE [DISTWIDTH] IN BLOOD BY AUTOMATED COUNT: 15.7 % (ref 11.6–15.1)
EST. AVERAGE GLUCOSE BLD GHB EST-MCNC: 134 MG/DL
GFR SERPL CREATININE-BSD FRML MDRD: 82 ML/MIN/1.73SQ M
GLUCOSE P FAST SERPL-MCNC: 125 MG/DL (ref 65–99)
HBA1C MFR BLD: 6.3 %
HCT VFR BLD AUTO: 41.9 % (ref 36.5–49.3)
HDLC SERPL-MCNC: 69 MG/DL
HGB BLD-MCNC: 12.8 G/DL (ref 12–17)
IMM GRANULOCYTES # BLD AUTO: 0.03 THOUSAND/UL (ref 0–0.2)
IMM GRANULOCYTES NFR BLD AUTO: 0 % (ref 0–2)
LDLC SERPL CALC-MCNC: 55 MG/DL (ref 0–100)
LYMPHOCYTES # BLD AUTO: 2.58 THOUSANDS/ÂΜL (ref 0.6–4.47)
LYMPHOCYTES NFR BLD AUTO: 30 % (ref 14–44)
MCH RBC QN AUTO: 29.2 PG (ref 26.8–34.3)
MCHC RBC AUTO-ENTMCNC: 30.5 G/DL (ref 31.4–37.4)
MCV RBC AUTO: 96 FL (ref 82–98)
MICROALBUMIN UR-MCNC: 28.7 MG/L
MICROALBUMIN/CREAT 24H UR: 25 MG/G CREATININE (ref 0–30)
MONOCYTES # BLD AUTO: 0.72 THOUSAND/ÂΜL (ref 0.17–1.22)
MONOCYTES NFR BLD AUTO: 8 % (ref 4–12)
NEUTROPHILS # BLD AUTO: 5.21 THOUSANDS/ÂΜL (ref 1.85–7.62)
NEUTS SEG NFR BLD AUTO: 61 % (ref 43–75)
NONHDLC SERPL-MCNC: 78 MG/DL
NRBC BLD AUTO-RTO: 0 /100 WBCS
PLATELET # BLD AUTO: 172 THOUSANDS/UL (ref 149–390)
PMV BLD AUTO: 11.3 FL (ref 8.9–12.7)
POTASSIUM SERPL-SCNC: 4.6 MMOL/L (ref 3.5–5.3)
PROT SERPL-MCNC: 6.5 G/DL (ref 6.4–8.4)
RBC # BLD AUTO: 4.38 MILLION/UL (ref 3.88–5.62)
SODIUM SERPL-SCNC: 136 MMOL/L (ref 135–147)
TRIGL SERPL-MCNC: 117 MG/DL (ref ?–150)
WBC # BLD AUTO: 8.62 THOUSAND/UL (ref 4.31–10.16)

## 2025-06-12 PROCEDURE — 36415 COLL VENOUS BLD VENIPUNCTURE: CPT

## 2025-06-12 PROCEDURE — 80053 COMPREHEN METABOLIC PANEL: CPT

## 2025-06-12 PROCEDURE — 85025 COMPLETE CBC W/AUTO DIFF WBC: CPT

## 2025-06-12 PROCEDURE — 82043 UR ALBUMIN QUANTITATIVE: CPT

## 2025-06-12 PROCEDURE — 80061 LIPID PANEL: CPT

## 2025-06-12 PROCEDURE — 82570 ASSAY OF URINE CREATININE: CPT

## 2025-06-12 PROCEDURE — 83036 HEMOGLOBIN GLYCOSYLATED A1C: CPT

## 2025-06-13 ENCOUNTER — HOSPITAL ENCOUNTER (OUTPATIENT)
Dept: INFUSION CENTER | Facility: CLINIC | Age: 85
End: 2025-06-13
Attending: INTERNAL MEDICINE
Payer: MEDICARE

## 2025-06-13 VITALS
DIASTOLIC BLOOD PRESSURE: 53 MMHG | WEIGHT: 159.5 LBS | TEMPERATURE: 97.3 F | BODY MASS INDEX: 22.89 KG/M2 | RESPIRATION RATE: 16 BRPM | HEART RATE: 76 BPM | SYSTOLIC BLOOD PRESSURE: 123 MMHG | OXYGEN SATURATION: 96 %

## 2025-06-13 DIAGNOSIS — I65.23 BILATERAL CAROTID ARTERY STENOSIS: ICD-10-CM

## 2025-06-13 DIAGNOSIS — C91.10 CLL (CHRONIC LYMPHOCYTIC LEUKEMIA) (HCC): ICD-10-CM

## 2025-06-13 DIAGNOSIS — D50.0 BLOOD LOSS ANEMIA: ICD-10-CM

## 2025-06-13 DIAGNOSIS — D80.3 IGG DEFICIENCY (HCC): Primary | ICD-10-CM

## 2025-06-13 DIAGNOSIS — Z86.79 HISTORY OF ATRIAL FIBRILLATION: ICD-10-CM

## 2025-06-13 PROCEDURE — 96375 TX/PRO/DX INJ NEW DRUG ADDON: CPT

## 2025-06-13 PROCEDURE — 96365 THER/PROPH/DIAG IV INF INIT: CPT

## 2025-06-13 PROCEDURE — 96366 THER/PROPH/DIAG IV INF ADDON: CPT

## 2025-06-13 RX ORDER — DIPHENHYDRAMINE HCL 25 MG
12.5 TABLET ORAL ONCE
Status: COMPLETED | OUTPATIENT
Start: 2025-06-13 | End: 2025-06-13

## 2025-06-13 RX ORDER — HYDROCORTISONE SODIUM SUCCINATE 100 MG/2ML
100 INJECTION INTRAMUSCULAR; INTRAVENOUS ONCE
Status: COMPLETED | OUTPATIENT
Start: 2025-06-13 | End: 2025-06-13

## 2025-06-13 RX ORDER — ACETAMINOPHEN 325 MG/1
650 TABLET ORAL ONCE
OUTPATIENT
Start: 2025-07-11

## 2025-06-13 RX ORDER — ACETAMINOPHEN 325 MG/1
650 TABLET ORAL ONCE
Status: COMPLETED | OUTPATIENT
Start: 2025-06-13 | End: 2025-06-13

## 2025-06-13 RX ORDER — HYDROCORTISONE SODIUM SUCCINATE 100 MG/2ML
100 INJECTION INTRAMUSCULAR; INTRAVENOUS ONCE
OUTPATIENT
Start: 2025-07-11

## 2025-06-13 RX ORDER — SODIUM CHLORIDE 9 MG/ML
20 INJECTION, SOLUTION INTRAVENOUS ONCE
OUTPATIENT
Start: 2025-07-11

## 2025-06-13 RX ORDER — SODIUM CHLORIDE 9 MG/ML
20 INJECTION, SOLUTION INTRAVENOUS ONCE
Status: COMPLETED | OUTPATIENT
Start: 2025-06-13 | End: 2025-06-13

## 2025-06-13 RX ORDER — DIPHENHYDRAMINE HCL 25 MG
12.5 TABLET ORAL ONCE
OUTPATIENT
Start: 2025-07-11

## 2025-06-13 RX ADMIN — SODIUM CHLORIDE 20 ML/HR: 0.9 INJECTION, SOLUTION INTRAVENOUS at 08:54

## 2025-06-13 RX ADMIN — ACETAMINOPHEN 650 MG: 325 TABLET ORAL at 08:54

## 2025-06-13 RX ADMIN — HYDROCORTISONE SODIUM SUCCINATE 100 MG: 100 INJECTION, POWDER, FOR SOLUTION INTRAMUSCULAR; INTRAVENOUS at 08:54

## 2025-06-13 RX ADMIN — DIPHENHYDRAMINE HYDROCHLORIDE 12.5 MG: 25 TABLET ORAL at 08:54

## 2025-06-13 RX ADMIN — Medication 30 G: at 09:42

## 2025-06-13 NOTE — PROGRESS NOTES
Patient arrives to infusion center for IVIG today. PIV placed without issue, patient tolerated well. Patient resting on recliner chair, call bell within reach.

## 2025-06-13 NOTE — PROGRESS NOTES
Treatment tolerated without incident. Next appt confirmed with patient for 7/11 at 0830 at [facility]. AVS printed and provided to patient.

## 2025-06-24 ENCOUNTER — HOSPITAL ENCOUNTER (OUTPATIENT)
Dept: RADIOLOGY | Age: 85
Discharge: HOME | End: 2025-06-24
Attending: INTERNAL MEDICINE
Payer: MEDICARE

## 2025-06-24 DIAGNOSIS — R91.1 SOLITARY PULMONARY NODULE: ICD-10-CM

## 2025-06-24 PROCEDURE — 71250 CT THORAX DX C-: CPT

## 2025-07-11 ENCOUNTER — HOSPITAL ENCOUNTER (OUTPATIENT)
Dept: INFUSION CENTER | Facility: CLINIC | Age: 85
End: 2025-07-11
Attending: INTERNAL MEDICINE
Payer: MEDICARE

## 2025-07-11 VITALS
RESPIRATION RATE: 18 BRPM | HEART RATE: 76 BPM | BODY MASS INDEX: 23.39 KG/M2 | OXYGEN SATURATION: 95 % | TEMPERATURE: 97.5 F | SYSTOLIC BLOOD PRESSURE: 165 MMHG | DIASTOLIC BLOOD PRESSURE: 67 MMHG | WEIGHT: 163 LBS

## 2025-07-11 DIAGNOSIS — I65.23 BILATERAL CAROTID ARTERY STENOSIS: ICD-10-CM

## 2025-07-11 DIAGNOSIS — D50.0 BLOOD LOSS ANEMIA: ICD-10-CM

## 2025-07-11 DIAGNOSIS — C91.10 CLL (CHRONIC LYMPHOCYTIC LEUKEMIA) (HCC): ICD-10-CM

## 2025-07-11 DIAGNOSIS — D80.3 IGG DEFICIENCY (HCC): Primary | ICD-10-CM

## 2025-07-11 DIAGNOSIS — Z86.79 HISTORY OF ATRIAL FIBRILLATION: ICD-10-CM

## 2025-07-11 PROCEDURE — 96375 TX/PRO/DX INJ NEW DRUG ADDON: CPT

## 2025-07-11 PROCEDURE — 96366 THER/PROPH/DIAG IV INF ADDON: CPT

## 2025-07-11 PROCEDURE — 96365 THER/PROPH/DIAG IV INF INIT: CPT

## 2025-07-11 RX ORDER — HYDROCORTISONE SODIUM SUCCINATE 100 MG/2ML
100 INJECTION INTRAMUSCULAR; INTRAVENOUS ONCE
Status: COMPLETED | OUTPATIENT
Start: 2025-07-11 | End: 2025-07-11

## 2025-07-11 RX ORDER — HYDROCORTISONE SODIUM SUCCINATE 100 MG/2ML
100 INJECTION INTRAMUSCULAR; INTRAVENOUS ONCE
OUTPATIENT
Start: 2025-08-08

## 2025-07-11 RX ORDER — ACETAMINOPHEN 325 MG/1
650 TABLET ORAL ONCE
Status: COMPLETED | OUTPATIENT
Start: 2025-07-11 | End: 2025-07-11

## 2025-07-11 RX ORDER — DIPHENHYDRAMINE HCL 25 MG
12.5 TABLET ORAL ONCE
Status: COMPLETED | OUTPATIENT
Start: 2025-07-11 | End: 2025-07-11

## 2025-07-11 RX ORDER — SODIUM CHLORIDE 9 MG/ML
20 INJECTION, SOLUTION INTRAVENOUS ONCE
OUTPATIENT
Start: 2025-08-08

## 2025-07-11 RX ORDER — DIPHENHYDRAMINE HCL 25 MG
12.5 TABLET ORAL ONCE
OUTPATIENT
Start: 2025-08-08

## 2025-07-11 RX ORDER — SODIUM CHLORIDE 9 MG/ML
20 INJECTION, SOLUTION INTRAVENOUS ONCE
Status: COMPLETED | OUTPATIENT
Start: 2025-07-11 | End: 2025-07-11

## 2025-07-11 RX ORDER — ACETAMINOPHEN 325 MG/1
650 TABLET ORAL ONCE
OUTPATIENT
Start: 2025-08-08

## 2025-07-11 RX ADMIN — Medication 30 G: at 09:00

## 2025-07-11 RX ADMIN — HYDROCORTISONE SODIUM SUCCINATE 100 MG: 100 INJECTION, POWDER, FOR SOLUTION INTRAMUSCULAR; INTRAVENOUS at 08:05

## 2025-07-11 RX ADMIN — DIPHENHYDRAMINE HYDROCHLORIDE 12.5 MG: 25 TABLET ORAL at 08:05

## 2025-07-11 RX ADMIN — SODIUM CHLORIDE 20 ML/HR: 0.9 INJECTION, SOLUTION INTRAVENOUS at 07:50

## 2025-07-11 RX ADMIN — ACETAMINOPHEN 650 MG: 325 TABLET ORAL at 08:05

## 2025-07-11 NOTE — PROGRESS NOTES
Tolerated infusion without incident: No adverse reactions noted: Verified follow up appt with patient ( 08/08/25 ): AVS offered and declined

## 2025-07-11 NOTE — PROGRESS NOTES
Patient to Infusion center for IVIG: Offers no complaints at present time: Lab work ( 06/12/25 ) reviewed: Within parameters to treat: Left FA PIV initiated without incident

## 2025-08-08 ENCOUNTER — HOSPITAL ENCOUNTER (OUTPATIENT)
Dept: INFUSION CENTER | Facility: CLINIC | Age: 85
End: 2025-08-08
Attending: INTERNAL MEDICINE
Payer: MEDICARE

## (undated) DEVICE — THE MONARCH® "D" CARTRIDGE IS A SINGLE-USE POLYPROPYLENE CARTRIDGE FOR POSTERIOR CHAMBER IOL DELIVERY: Brand: MONARCH® III

## (undated) DEVICE — CLEARCUT® SLIT KNIFE INTREPID MICRO-COAXIAL SYSTEM 2.4 SB: Brand: CLEARCUT®; INTREPID

## (undated) DEVICE — NEEDLE 25G X 1 1/2

## (undated) DEVICE — INTENDED FOR TISSUE SEPARATION, AND OTHER PROCEDURES THAT REQUIRE A SHARP SURGICAL BLADE TO PUNCTURE OR CUT.: Brand: BARD-PARKER SAFETY BLADES SIZE 15, STERILE

## (undated) DEVICE — GLOVE SRG BIOGEL 7.5

## (undated) DEVICE — BASIC ULTRASOUND: Brand: ALCON, INFINITI

## (undated) DEVICE — INTREPID® TRANSFORMER IA HP: Brand: INTREPID®

## (undated) DEVICE — EYE PACK CUSTOM -FINNEGAN

## (undated) DEVICE — MICROSURGICAL INSTRUMENT IRR. CYSTITOME 25GA STRAIGHT-REVERSE CUTTING: Brand: ALCON

## (undated) DEVICE — GLOVE INDICATOR PI UNDERGLOVE SZ 8 BLUE

## (undated) DEVICE — DENTAL PACK: Brand: CARDINAL HEALTH

## (undated) DEVICE — ACTIVE FMS W/ INTREPID* ULTRA SLEEVES, 0.9MM 45° ABS* INTREPID* BALANCED TIP: Brand: ALCON

## (undated) DEVICE — AIR INJECT CANNULA 27GA: Brand: OPHTHALMIC CANNULA

## (undated) DEVICE — 0.9MM MICROSMOOTH NULTRA INFUSION SLEEVE KIT: Brand: INFINIT, MICROSMOOTH, ALCON

## (undated) DEVICE — B-H IRRIGATING CAN 19GA FLAT ANGLED 8MM: Brand: OPHTHALMIC CANNULA

## (undated) DEVICE — 45° KELMAN®, 0.9 MM TURBOSONICS® MINI-FLARED ABS® TIP: Brand: ALCON, KELMAN, TURBOSONICS, MINI-FLARED ABS

## (undated) DEVICE — BIPOLAR CORD DISP